# Patient Record
Sex: MALE | Race: WHITE | Employment: PART TIME | URBAN - METROPOLITAN AREA
[De-identification: names, ages, dates, MRNs, and addresses within clinical notes are randomized per-mention and may not be internally consistent; named-entity substitution may affect disease eponyms.]

---

## 2017-01-17 ENCOUNTER — TELEPHONE (OUTPATIENT)
Dept: INTERNAL MEDICINE | Age: 67
End: 2017-01-17

## 2017-01-17 ENCOUNTER — NURSE ONLY (OUTPATIENT)
Dept: INTERNAL MEDICINE | Age: 67
End: 2017-01-17

## 2017-01-17 DIAGNOSIS — E78.00 HYPERCHOLESTEROLEMIA: ICD-10-CM

## 2017-01-17 DIAGNOSIS — E03.4 HYPOTHYROIDISM DUE TO ACQUIRED ATROPHY OF THYROID: Primary | ICD-10-CM

## 2017-01-17 DIAGNOSIS — Z11.59 NEED FOR HEPATITIS C SCREENING TEST: ICD-10-CM

## 2017-01-17 DIAGNOSIS — N18.30 CRF (CHRONIC RENAL FAILURE), STAGE 3 (MODERATE) (HCC): ICD-10-CM

## 2017-01-17 LAB
ALBUMIN SERPL-MCNC: 4.4 G/DL (ref 3.9–4.9)
ALP BLD-CCNC: 82 U/L (ref 35–104)
ALT SERPL-CCNC: 16 U/L (ref 0–41)
ANION GAP SERPL CALCULATED.3IONS-SCNC: 13 MEQ/L (ref 7–13)
AST SERPL-CCNC: 20 U/L (ref 0–40)
BILIRUB SERPL-MCNC: 0.6 MG/DL (ref 0–1.2)
BUN BLDV-MCNC: 17 MG/DL (ref 8–23)
CALCIUM SERPL-MCNC: 9.6 MG/DL (ref 8.6–10.2)
CHLORIDE BLD-SCNC: 102 MEQ/L (ref 98–107)
CHOLESTEROL, TOTAL: 214 MG/DL (ref 0–199)
CO2: 24 MEQ/L (ref 22–29)
CREAT SERPL-MCNC: 1.57 MG/DL (ref 0.7–1.2)
GFR AFRICAN AMERICAN: 53.3
GFR NON-AFRICAN AMERICAN: 44.1
GLOBULIN: 1.9 G/DL (ref 2.3–3.5)
GLUCOSE BLD-MCNC: 90 MG/DL (ref 74–109)
HDLC SERPL-MCNC: 42 MG/DL (ref 40–59)
HEPATITIS C ANTIBODY INTERPRETATION: NORMAL
LDL CHOLESTEROL CALCULATED: 148 MG/DL (ref 0–129)
POTASSIUM SERPL-SCNC: 4.3 MEQ/L (ref 3.5–5.1)
SODIUM BLD-SCNC: 139 MEQ/L (ref 132–144)
T4 FREE: 1.53 NG/DL (ref 0.93–1.7)
TOTAL PROTEIN: 6.3 G/DL (ref 6.4–8.1)
TRIGL SERPL-MCNC: 120 MG/DL (ref 0–200)
TSH SERPL DL<=0.05 MIU/L-ACNC: 0.72 UIU/ML (ref 0.27–4.2)

## 2017-01-17 RX ORDER — MELOXICAM 7.5 MG/1
TABLET ORAL
Qty: 45 TABLET | Refills: 1
Start: 2017-01-17 | End: 2017-03-08

## 2017-02-06 RX ORDER — LOVASTATIN 20 MG/1
TABLET ORAL
Qty: 180 TABLET | Refills: 3 | Status: SHIPPED | OUTPATIENT
Start: 2017-02-06 | End: 2018-02-28 | Stop reason: ALTCHOICE

## 2017-03-08 ENCOUNTER — OFFICE VISIT (OUTPATIENT)
Dept: INTERNAL MEDICINE | Age: 67
End: 2017-03-08

## 2017-03-08 VITALS
HEIGHT: 72 IN | SYSTOLIC BLOOD PRESSURE: 112 MMHG | RESPIRATION RATE: 16 BRPM | TEMPERATURE: 98.6 F | DIASTOLIC BLOOD PRESSURE: 78 MMHG | HEART RATE: 83 BPM | BODY MASS INDEX: 28.17 KG/M2 | OXYGEN SATURATION: 98 % | WEIGHT: 208 LBS

## 2017-03-08 DIAGNOSIS — N20.0 KIDNEY STONES: ICD-10-CM

## 2017-03-08 DIAGNOSIS — N18.30 CRF (CHRONIC RENAL FAILURE), STAGE 3 (MODERATE) (HCC): Primary | ICD-10-CM

## 2017-03-08 PROCEDURE — G8420 CALC BMI NORM PARAMETERS: HCPCS | Performed by: FAMILY MEDICINE

## 2017-03-08 PROCEDURE — 99213 OFFICE O/P EST LOW 20 MIN: CPT | Performed by: FAMILY MEDICINE

## 2017-03-08 PROCEDURE — 4040F PNEUMOC VAC/ADMIN/RCVD: CPT | Performed by: FAMILY MEDICINE

## 2017-03-08 PROCEDURE — 1036F TOBACCO NON-USER: CPT | Performed by: FAMILY MEDICINE

## 2017-03-08 PROCEDURE — G8484 FLU IMMUNIZE NO ADMIN: HCPCS | Performed by: FAMILY MEDICINE

## 2017-03-08 PROCEDURE — 1123F ACP DISCUSS/DSCN MKR DOCD: CPT | Performed by: FAMILY MEDICINE

## 2017-03-08 PROCEDURE — G8427 DOCREV CUR MEDS BY ELIG CLIN: HCPCS | Performed by: FAMILY MEDICINE

## 2017-03-08 PROCEDURE — 3017F COLORECTAL CA SCREEN DOC REV: CPT | Performed by: FAMILY MEDICINE

## 2017-03-11 RX ORDER — CITALOPRAM 40 MG/1
TABLET ORAL
Qty: 90 TABLET | Refills: 0 | Status: SHIPPED | OUTPATIENT
Start: 2017-03-11 | End: 2017-06-06 | Stop reason: SDUPTHER

## 2017-05-24 ENCOUNTER — ANESTHESIA EVENT (OUTPATIENT)
Dept: ENDOSCOPY | Age: 67
End: 2017-05-24
Payer: MEDICARE

## 2017-05-24 ENCOUNTER — ANESTHESIA (OUTPATIENT)
Dept: ENDOSCOPY | Age: 67
End: 2017-05-24
Payer: MEDICARE

## 2017-05-24 ENCOUNTER — HOSPITAL ENCOUNTER (OUTPATIENT)
Age: 67
Setting detail: OUTPATIENT SURGERY
Discharge: HOME | End: 2017-05-24
Attending: INTERNAL MEDICINE | Admitting: INTERNAL MEDICINE
Payer: MEDICARE

## 2017-05-24 VITALS
RESPIRATION RATE: 18 BRPM | DIASTOLIC BLOOD PRESSURE: 82 MMHG | BODY MASS INDEX: 28.31 KG/M2 | OXYGEN SATURATION: 94 % | SYSTOLIC BLOOD PRESSURE: 126 MMHG | HEART RATE: 84 BPM | TEMPERATURE: 97.5 F | WEIGHT: 209 LBS | HEIGHT: 72 IN

## 2017-05-24 VITALS
OXYGEN SATURATION: 97 % | RESPIRATION RATE: 7 BRPM | DIASTOLIC BLOOD PRESSURE: 74 MMHG | SYSTOLIC BLOOD PRESSURE: 133 MMHG

## 2017-05-24 PROCEDURE — 6360000002 HC RX W HCPCS: Performed by: NURSE ANESTHETIST, CERTIFIED REGISTERED

## 2017-05-24 PROCEDURE — 7100000010 HC PHASE II RECOVERY - FIRST 15 MIN: Performed by: INTERNAL MEDICINE

## 2017-05-24 PROCEDURE — 3609027000 HC COLONOSCOPY: Performed by: INTERNAL MEDICINE

## 2017-05-24 PROCEDURE — 3700000000 HC ANESTHESIA ATTENDED CARE: Performed by: INTERNAL MEDICINE

## 2017-05-24 PROCEDURE — 3700000001 HC ADD 15 MINUTES (ANESTHESIA): Performed by: INTERNAL MEDICINE

## 2017-05-24 PROCEDURE — 2580000003 HC RX 258: Performed by: STUDENT IN AN ORGANIZED HEALTH CARE EDUCATION/TRAINING PROGRAM

## 2017-05-24 PROCEDURE — 2500000003 HC RX 250 WO HCPCS: Performed by: NURSE ANESTHETIST, CERTIFIED REGISTERED

## 2017-05-24 PROCEDURE — 88305 TISSUE EXAM BY PATHOLOGIST: CPT

## 2017-05-24 RX ORDER — ONDANSETRON 2 MG/ML
4 INJECTION INTRAMUSCULAR; INTRAVENOUS
Status: DISCONTINUED | OUTPATIENT
Start: 2017-05-24 | End: 2017-05-24 | Stop reason: HOSPADM

## 2017-05-24 RX ORDER — GLYCOPYRROLATE 0.2 MG/ML
INJECTION INTRAMUSCULAR; INTRAVENOUS PRN
Status: DISCONTINUED | OUTPATIENT
Start: 2017-05-24 | End: 2017-05-24 | Stop reason: SDUPTHER

## 2017-05-24 RX ORDER — PROPOFOL 10 MG/ML
INJECTION, EMULSION INTRAVENOUS PRN
Status: DISCONTINUED | OUTPATIENT
Start: 2017-05-24 | End: 2017-05-24 | Stop reason: SDUPTHER

## 2017-05-24 RX ORDER — SODIUM CHLORIDE 9 MG/ML
INJECTION, SOLUTION INTRAVENOUS CONTINUOUS
Status: DISCONTINUED | OUTPATIENT
Start: 2017-05-24 | End: 2017-05-24 | Stop reason: HOSPADM

## 2017-05-24 RX ADMIN — PROPOFOL 10 MG: 10 INJECTION, EMULSION INTRAVENOUS at 13:27

## 2017-05-24 RX ADMIN — PROPOFOL 20 MG: 10 INJECTION, EMULSION INTRAVENOUS at 13:17

## 2017-05-24 RX ADMIN — PROPOFOL 10 MG: 10 INJECTION, EMULSION INTRAVENOUS at 13:23

## 2017-05-24 RX ADMIN — PROPOFOL 50 MG: 10 INJECTION, EMULSION INTRAVENOUS at 13:15

## 2017-05-24 RX ADMIN — PROPOFOL 40 MG: 10 INJECTION, EMULSION INTRAVENOUS at 13:16

## 2017-05-24 RX ADMIN — PROPOFOL 20 MG: 10 INJECTION, EMULSION INTRAVENOUS at 13:19

## 2017-05-24 RX ADMIN — PROPOFOL 10 MG: 10 INJECTION, EMULSION INTRAVENOUS at 13:25

## 2017-05-24 RX ADMIN — SODIUM CHLORIDE: 9 INJECTION, SOLUTION INTRAVENOUS at 12:34

## 2017-05-24 RX ADMIN — GLYCOPYRROLATE 0.2 MG: 0.2 INJECTION INTRAMUSCULAR; INTRAVENOUS at 13:23

## 2017-05-24 RX ADMIN — PROPOFOL 20 MG: 10 INJECTION, EMULSION INTRAVENOUS at 13:21

## 2017-05-24 RX ADMIN — PROPOFOL 50 MG: 10 INJECTION, EMULSION INTRAVENOUS at 13:14

## 2017-05-24 RX ADMIN — PROPOFOL 10 MG: 10 INJECTION, EMULSION INTRAVENOUS at 13:29

## 2017-05-24 ASSESSMENT — PAIN - FUNCTIONAL ASSESSMENT: PAIN_FUNCTIONAL_ASSESSMENT: 0-10

## 2017-05-24 ASSESSMENT — PAIN DESCRIPTION - DESCRIPTORS: DESCRIPTORS: ACHING

## 2017-06-06 RX ORDER — LEVOTHYROXINE SODIUM 112 UG/1
TABLET ORAL
Qty: 90 TABLET | Refills: 3 | Status: SHIPPED | OUTPATIENT
Start: 2017-06-06 | End: 2018-06-01 | Stop reason: SDUPTHER

## 2017-06-06 RX ORDER — CITALOPRAM 40 MG/1
TABLET ORAL
Qty: 90 TABLET | Refills: 3 | Status: SHIPPED | OUTPATIENT
Start: 2017-06-06 | End: 2018-02-12

## 2017-07-10 ENCOUNTER — OFFICE VISIT (OUTPATIENT)
Dept: INTERNAL MEDICINE | Age: 67
End: 2017-07-10

## 2017-07-10 VITALS
BODY MASS INDEX: 28.47 KG/M2 | RESPIRATION RATE: 16 BRPM | TEMPERATURE: 98.5 F | HEART RATE: 70 BPM | OXYGEN SATURATION: 94 % | HEIGHT: 72 IN | WEIGHT: 210.2 LBS | DIASTOLIC BLOOD PRESSURE: 70 MMHG | SYSTOLIC BLOOD PRESSURE: 100 MMHG

## 2017-07-10 DIAGNOSIS — R25.1 TREMORS OF NERVOUS SYSTEM: ICD-10-CM

## 2017-07-10 DIAGNOSIS — M62.40 INVOLUNTARY MUSCLE CONTRACTIONS: ICD-10-CM

## 2017-07-10 DIAGNOSIS — N18.30 CRF (CHRONIC RENAL FAILURE), STAGE 3 (MODERATE) (HCC): Primary | ICD-10-CM

## 2017-07-10 PROCEDURE — 3017F COLORECTAL CA SCREEN DOC REV: CPT | Performed by: FAMILY MEDICINE

## 2017-07-10 PROCEDURE — 1123F ACP DISCUSS/DSCN MKR DOCD: CPT | Performed by: FAMILY MEDICINE

## 2017-07-10 PROCEDURE — G8427 DOCREV CUR MEDS BY ELIG CLIN: HCPCS | Performed by: FAMILY MEDICINE

## 2017-07-10 PROCEDURE — G8419 CALC BMI OUT NRM PARAM NOF/U: HCPCS | Performed by: FAMILY MEDICINE

## 2017-07-10 PROCEDURE — 99214 OFFICE O/P EST MOD 30 MIN: CPT | Performed by: FAMILY MEDICINE

## 2017-07-10 PROCEDURE — 1036F TOBACCO NON-USER: CPT | Performed by: FAMILY MEDICINE

## 2017-07-10 PROCEDURE — 4040F PNEUMOC VAC/ADMIN/RCVD: CPT | Performed by: FAMILY MEDICINE

## 2017-07-25 ENCOUNTER — TELEPHONE (OUTPATIENT)
Dept: INTERNAL MEDICINE | Age: 67
End: 2017-07-25

## 2017-07-31 ENCOUNTER — HOSPITAL ENCOUNTER (EMERGENCY)
Age: 67
Discharge: HOME | End: 2017-07-31
Payer: MEDICARE

## 2017-07-31 VITALS
RESPIRATION RATE: 14 BRPM | WEIGHT: 209 LBS | DIASTOLIC BLOOD PRESSURE: 76 MMHG | TEMPERATURE: 98.5 F | HEART RATE: 89 BPM | BODY MASS INDEX: 28.31 KG/M2 | OXYGEN SATURATION: 96 % | SYSTOLIC BLOOD PRESSURE: 112 MMHG | HEIGHT: 72 IN

## 2017-07-31 DIAGNOSIS — T16.2XXA FOREIGN BODY IN EAR, LEFT, INITIAL ENCOUNTER: Primary | ICD-10-CM

## 2017-07-31 PROCEDURE — 69200 CLEAR OUTER EAR CANAL: CPT

## 2017-07-31 PROCEDURE — 99282 EMERGENCY DEPT VISIT SF MDM: CPT

## 2017-07-31 ASSESSMENT — ENCOUNTER SYMPTOMS
SHORTNESS OF BREATH: 0
ABDOMINAL PAIN: 0
BACK PAIN: 0
COUGH: 0

## 2017-07-31 ASSESSMENT — PAIN DESCRIPTION - FREQUENCY: FREQUENCY: CONTINUOUS

## 2017-07-31 ASSESSMENT — PAIN DESCRIPTION - PAIN TYPE: TYPE: ACUTE PAIN

## 2017-07-31 ASSESSMENT — PAIN DESCRIPTION - LOCATION: LOCATION: EAR

## 2017-07-31 ASSESSMENT — PAIN SCALES - GENERAL: PAINLEVEL_OUTOF10: 4

## 2017-07-31 ASSESSMENT — PAIN DESCRIPTION - DESCRIPTORS: DESCRIPTORS: HEADACHE;PRESSURE

## 2017-07-31 ASSESSMENT — PAIN DESCRIPTION - ORIENTATION: ORIENTATION: LEFT

## 2017-11-13 ENCOUNTER — HOSPITAL ENCOUNTER (OUTPATIENT)
Dept: CT IMAGING | Age: 67
Discharge: HOME | End: 2017-11-13
Payer: MEDICARE

## 2017-11-13 VITALS
BODY MASS INDEX: 28.44 KG/M2 | DIASTOLIC BLOOD PRESSURE: 79 MMHG | RESPIRATION RATE: 16 BRPM | HEART RATE: 81 BPM | SYSTOLIC BLOOD PRESSURE: 120 MMHG | WEIGHT: 210 LBS | HEIGHT: 72 IN

## 2017-11-13 DIAGNOSIS — R56.9 SEIZURES (HCC): ICD-10-CM

## 2017-11-13 DIAGNOSIS — G91.9 IDIOPATHIC HYDROCEPHALUS (HCC): ICD-10-CM

## 2017-11-13 PROCEDURE — 70450 CT HEAD/BRAIN W/O DYE: CPT

## 2017-12-27 ENCOUNTER — HOSPITAL ENCOUNTER (OUTPATIENT)
Dept: NEUROLOGY | Age: 67
Discharge: HOME | End: 2017-12-27
Payer: MEDICARE

## 2017-12-27 PROCEDURE — 95816 EEG AWAKE AND DROWSY: CPT

## 2017-12-28 NOTE — PROCEDURES
[address]                       [address], [address] [facility]                            ELECTROENCEPHALOGRAM REPORT    PATIENT NAME: Rober Oneal                    :        1950  MED REC NO:   [id number]                            ROOM:  ACCOUNT NO:   [de-identified]                           ADMIT DATE: 2017  PROVIDER:     Kim Madera MD    DATE OF EE2017    MEDICATIONS:  Lopressor, Celexa, Synthroid, and Mevacor. This is a spontaneous 21-channel EEG recording for this patient with  questionable seizures. The background rhythm of this EEG shows 8 to 9 Hz  posterior dominant rhythm of alpha. This is symmetrical and attenuates  with eye opening. EKG is monitored, this is regular. Photic stimulation  was performed without any photic responses. There is no photic response to  seizures. The records are better regulated when hyperventilation is  performed with good effort. Hyperventilation is performed with good effort  with better regulated alpha rhythms. There is no suggestion of any tremors  or epileptiform discharge. IMPRESSION:  This is normal well-regulated EEG recording. Clinical course  is recommended.       Subha Tiwari MD    D: 2017 18:53:47       T: 2017 19:23:15     [email or web address]  Job#: [id number]     Doc#: [id number]    CC:

## 2018-02-12 ENCOUNTER — OFFICE VISIT (OUTPATIENT)
Dept: INTERNAL MEDICINE CLINIC | Age: 68
End: 2018-02-12
Payer: MEDICARE

## 2018-02-12 VITALS
OXYGEN SATURATION: 96 % | WEIGHT: 216.4 LBS | SYSTOLIC BLOOD PRESSURE: 112 MMHG | HEIGHT: 72 IN | DIASTOLIC BLOOD PRESSURE: 80 MMHG | BODY MASS INDEX: 29.31 KG/M2 | TEMPERATURE: 98 F | HEART RATE: 95 BPM

## 2018-02-12 DIAGNOSIS — L30.9 DERMATITIS: ICD-10-CM

## 2018-02-12 DIAGNOSIS — E03.4 HYPOTHYROIDISM DUE TO ACQUIRED ATROPHY OF THYROID: Primary | ICD-10-CM

## 2018-02-12 DIAGNOSIS — E78.00 HYPERCHOLESTEROLEMIA: ICD-10-CM

## 2018-02-12 DIAGNOSIS — F33.41 MAJOR DEPRESSIVE DISORDER, RECURRENT, IN PARTIAL REMISSION (HCC): ICD-10-CM

## 2018-02-12 PROCEDURE — G8427 DOCREV CUR MEDS BY ELIG CLIN: HCPCS | Performed by: FAMILY MEDICINE

## 2018-02-12 PROCEDURE — 3017F COLORECTAL CA SCREEN DOC REV: CPT | Performed by: FAMILY MEDICINE

## 2018-02-12 PROCEDURE — G8419 CALC BMI OUT NRM PARAM NOF/U: HCPCS | Performed by: FAMILY MEDICINE

## 2018-02-12 PROCEDURE — 1123F ACP DISCUSS/DSCN MKR DOCD: CPT | Performed by: FAMILY MEDICINE

## 2018-02-12 PROCEDURE — 4040F PNEUMOC VAC/ADMIN/RCVD: CPT | Performed by: FAMILY MEDICINE

## 2018-02-12 PROCEDURE — 1036F TOBACCO NON-USER: CPT | Performed by: FAMILY MEDICINE

## 2018-02-12 PROCEDURE — 99214 OFFICE O/P EST MOD 30 MIN: CPT | Performed by: FAMILY MEDICINE

## 2018-02-12 PROCEDURE — G8484 FLU IMMUNIZE NO ADMIN: HCPCS | Performed by: FAMILY MEDICINE

## 2018-02-12 RX ORDER — PRIMIDONE 50 MG/1
50 TABLET ORAL 3 TIMES DAILY
COMMUNITY
End: 2019-11-27 | Stop reason: SDUPTHER

## 2018-02-12 RX ORDER — BUSPIRONE HYDROCHLORIDE 10 MG/1
10 TABLET ORAL 3 TIMES DAILY
COMMUNITY
End: 2019-11-21 | Stop reason: SDUPTHER

## 2018-02-12 ASSESSMENT — PATIENT HEALTH QUESTIONNAIRE - PHQ9
2. FEELING DOWN, DEPRESSED OR HOPELESS: 0
1. LITTLE INTEREST OR PLEASURE IN DOING THINGS: 0
SUM OF ALL RESPONSES TO PHQ QUESTIONS 1-9: 0
SUM OF ALL RESPONSES TO PHQ9 QUESTIONS 1 & 2: 0

## 2018-02-27 ENCOUNTER — NURSE ONLY (OUTPATIENT)
Dept: INTERNAL MEDICINE CLINIC | Age: 68
End: 2018-02-27
Payer: MEDICARE

## 2018-02-27 DIAGNOSIS — E03.4 HYPOTHYROIDISM DUE TO ACQUIRED ATROPHY OF THYROID: ICD-10-CM

## 2018-02-27 DIAGNOSIS — N18.30 CRF (CHRONIC RENAL FAILURE), STAGE 3 (MODERATE) (HCC): ICD-10-CM

## 2018-02-27 DIAGNOSIS — E78.00 HYPERCHOLESTEROLEMIA: ICD-10-CM

## 2018-02-27 LAB
ALBUMIN SERPL-MCNC: 4.5 G/DL (ref 3.9–4.9)
ALP BLD-CCNC: 86 U/L (ref 35–104)
ALT SERPL-CCNC: 19 U/L (ref 0–41)
ANION GAP SERPL CALCULATED.3IONS-SCNC: 15 MEQ/L (ref 7–13)
AST SERPL-CCNC: 19 U/L (ref 0–40)
BILIRUB SERPL-MCNC: 0.4 MG/DL (ref 0–1.2)
BUN BLDV-MCNC: 24 MG/DL (ref 8–23)
CALCIUM SERPL-MCNC: 9.5 MG/DL (ref 8.6–10.2)
CHLORIDE BLD-SCNC: 104 MEQ/L (ref 98–107)
CHOLESTEROL, TOTAL: 232 MG/DL (ref 0–199)
CO2: 25 MEQ/L (ref 22–29)
CREAT SERPL-MCNC: 1.34 MG/DL (ref 0.7–1.2)
GFR AFRICAN AMERICAN: >60
GFR NON-AFRICAN AMERICAN: 52.7
GLOBULIN: 2.4 G/DL (ref 2.3–3.5)
GLUCOSE BLD-MCNC: 90 MG/DL (ref 74–109)
HDLC SERPL-MCNC: 46 MG/DL (ref 40–59)
LDL CHOLESTEROL CALCULATED: 163 MG/DL (ref 0–129)
POTASSIUM SERPL-SCNC: 5 MEQ/L (ref 3.5–5.1)
SODIUM BLD-SCNC: 144 MEQ/L (ref 132–144)
T4 FREE: 1.29 NG/DL (ref 0.93–1.7)
TOTAL PROTEIN: 6.9 G/DL (ref 6.4–8.1)
TRIGL SERPL-MCNC: 116 MG/DL (ref 0–200)
TSH SERPL DL<=0.05 MIU/L-ACNC: 0.78 UIU/ML (ref 0.27–4.2)

## 2018-02-27 PROCEDURE — 36415 COLL VENOUS BLD VENIPUNCTURE: CPT | Performed by: FAMILY MEDICINE

## 2018-02-28 RX ORDER — ATORVASTATIN CALCIUM 40 MG/1
40 TABLET, FILM COATED ORAL NIGHTLY
Qty: 90 TABLET | Refills: 3 | Status: SHIPPED | OUTPATIENT
Start: 2018-02-28 | End: 2019-03-06 | Stop reason: SDUPTHER

## 2018-03-20 ENCOUNTER — TELEPHONE (OUTPATIENT)
Dept: INTERNAL MEDICINE CLINIC | Age: 68
End: 2018-03-20

## 2018-06-01 RX ORDER — LEVOTHYROXINE SODIUM 112 UG/1
TABLET ORAL
Qty: 90 TABLET | Refills: 1 | Status: SHIPPED | OUTPATIENT
Start: 2018-06-01 | End: 2019-01-07 | Stop reason: SDUPTHER

## 2018-08-14 ENCOUNTER — OFFICE VISIT (OUTPATIENT)
Dept: INTERNAL MEDICINE CLINIC | Age: 68
End: 2018-08-14
Payer: MEDICARE

## 2018-08-14 VITALS
TEMPERATURE: 97.7 F | HEART RATE: 80 BPM | BODY MASS INDEX: 28.83 KG/M2 | RESPIRATION RATE: 17 BRPM | DIASTOLIC BLOOD PRESSURE: 60 MMHG | OXYGEN SATURATION: 97 % | WEIGHT: 212.6 LBS | SYSTOLIC BLOOD PRESSURE: 118 MMHG

## 2018-08-14 DIAGNOSIS — M54.5 CHRONIC MIDLINE LOW BACK PAIN, WITH SCIATICA PRESENCE UNSPECIFIED: ICD-10-CM

## 2018-08-14 DIAGNOSIS — G89.29 CHRONIC MIDLINE LOW BACK PAIN, WITH SCIATICA PRESENCE UNSPECIFIED: ICD-10-CM

## 2018-08-14 DIAGNOSIS — E03.4 HYPOTHYROIDISM DUE TO ACQUIRED ATROPHY OF THYROID: ICD-10-CM

## 2018-08-14 DIAGNOSIS — E78.00 HYPERCHOLESTEROLEMIA: Primary | ICD-10-CM

## 2018-08-14 DIAGNOSIS — F34.1 DYSTHYMIA: ICD-10-CM

## 2018-08-14 PROBLEM — M54.50 CHRONIC MIDLINE LOW BACK PAIN: Status: ACTIVE | Noted: 2018-08-14

## 2018-08-14 PROCEDURE — 1123F ACP DISCUSS/DSCN MKR DOCD: CPT | Performed by: FAMILY MEDICINE

## 2018-08-14 PROCEDURE — 1036F TOBACCO NON-USER: CPT | Performed by: FAMILY MEDICINE

## 2018-08-14 PROCEDURE — G8427 DOCREV CUR MEDS BY ELIG CLIN: HCPCS | Performed by: FAMILY MEDICINE

## 2018-08-14 PROCEDURE — G8419 CALC BMI OUT NRM PARAM NOF/U: HCPCS | Performed by: FAMILY MEDICINE

## 2018-08-14 PROCEDURE — 4040F PNEUMOC VAC/ADMIN/RCVD: CPT | Performed by: FAMILY MEDICINE

## 2018-08-14 PROCEDURE — 3017F COLORECTAL CA SCREEN DOC REV: CPT | Performed by: FAMILY MEDICINE

## 2018-08-14 PROCEDURE — 99214 OFFICE O/P EST MOD 30 MIN: CPT | Performed by: FAMILY MEDICINE

## 2018-08-14 PROCEDURE — 1101F PT FALLS ASSESS-DOCD LE1/YR: CPT | Performed by: FAMILY MEDICINE

## 2018-08-14 ASSESSMENT — PATIENT HEALTH QUESTIONNAIRE - PHQ9
SUM OF ALL RESPONSES TO PHQ9 QUESTIONS 1 & 2: 0
2. FEELING DOWN, DEPRESSED OR HOPELESS: 0
1. LITTLE INTEREST OR PLEASURE IN DOING THINGS: 0
SUM OF ALL RESPONSES TO PHQ QUESTIONS 1-9: 0
SUM OF ALL RESPONSES TO PHQ QUESTIONS 1-9: 0

## 2018-08-14 NOTE — PROGRESS NOTES
Patient: Gaudencio Jean Baptiste    YOB: 1950    Date: 8/14/18    Chief Complaint   Patient presents with    Hypothyroidism     Patient presents for a 6 moth follow up. Patient feeling well today, no refills.  Health Maintenance     Denies shingrix. Patient Active Problem List    Diagnosis Date Noted    Chronic midline low back pain 08/14/2018    Hypothyroidism due to acquired atrophy of thyroid 02/12/2018    Tremors of nervous system 07/10/2017    Hypercholesterolemia 12/06/2016    Kidney stones 05/14/2015    Tremors of nervous system 04/09/2015    Degenerative progressive high myopia 03/26/2015    Medial meniscus tear 03/27/2014    Knee pain, right 03/03/2014    Seasonal allergic conjunctivitis     BPH (benign prostatic hyperplasia)     Depression     Insomnia        Allergies   Allergen Reactions    Food Other (See Comments)     Burning sensation in stomach    Lactose Intolerance (Gi) Other (See Comments)     Sour stomach       Vitals:    08/14/18 1515   BP: 118/60   Site: Left Arm   Position: Sitting   Cuff Size: Small Adult   Pulse: 80   Resp: 17   Temp: 97.7 °F (36.5 °C)   TempSrc: Oral   SpO2: 97%   Weight: 212 lb 9.6 oz (96.4 kg)     Body mass index is 28.83 kg/m². HPI    He comes in today to follow-up on his chronic low back pain is thyroid disease and his depression. His back pain comes and goes us around his low back below the iliac crests and radiates more to the right and sometimes down to the leg. It's worse at work when he's walking around and standing on concrete and better when he is resting. Doesn't do any specific stretching or exercise. Also his weight is been stable he has no fever chills cough nausea or vomiting and his lab work was normal the spring. So the rest that time discussing stressors in his life somewhat revolve around the fact that he was stopped and he was able to find that he has 4 siblings that may still be alive that he could contact.   We Hypothyroidism due to acquired atrophy of thyroid  Check labs at follow-up    3. Chronic midline low back pain, with sciatica presence unspecified  Consider physical therapy if this bothers him    4. Dysthymia  Stable                Plan:  Current Outpatient Prescriptions   Medication Sig Dispense Refill    levothyroxine (SYNTHROID) 112 MCG tablet TAKE ONE TABLET BY MOUTH ONCE DAILY 90 tablet 1    atorvastatin (LIPITOR) 40 MG tablet Take 1 tablet by mouth nightly 90 tablet 3    busPIRone (BUSPAR) 10 MG tablet Take 10 mg by mouth 3 times daily      primidone (MYSOLINE) 50 MG tablet Take 50 mg by mouth 3 times daily      hydrocortisone 2.5 % cream Apply topically 2 times daily. 453 g 0    Multiple Vitamins-Minerals (MENS 50+ MULTI VITAMIN/MIN PO) Take 1 tablet by mouth daily       No current facility-administered medications for this visit. No orders of the defined types were placed in this encounter. No orders of the defined types were placed in this encounter. Return in about 6 months (around 2/14/2019).     Dr. Whitt Code      8/14/18  4:07 PM

## 2018-10-11 ENCOUNTER — NURSE ONLY (OUTPATIENT)
Dept: INTERNAL MEDICINE CLINIC | Age: 68
End: 2018-10-11
Payer: MEDICARE

## 2018-10-11 DIAGNOSIS — Z23 NEED FOR INFLUENZA VACCINATION: Primary | ICD-10-CM

## 2018-10-11 PROCEDURE — G0008 ADMIN INFLUENZA VIRUS VAC: HCPCS | Performed by: FAMILY MEDICINE

## 2018-10-11 PROCEDURE — 90662 IIV NO PRSV INCREASED AG IM: CPT | Performed by: FAMILY MEDICINE

## 2019-01-07 RX ORDER — LEVOTHYROXINE SODIUM 112 UG/1
TABLET ORAL
Qty: 90 TABLET | Refills: 1 | Status: SHIPPED | OUTPATIENT
Start: 2019-01-07 | End: 2019-02-20 | Stop reason: SDUPTHER

## 2019-02-11 PROBLEM — M54.50 CHRONIC MIDLINE LOW BACK PAIN: Status: RESOLVED | Noted: 2018-08-14 | Resolved: 2019-02-11

## 2019-02-11 PROBLEM — R25.1 TREMORS OF NERVOUS SYSTEM: Status: RESOLVED | Noted: 2017-07-10 | Resolved: 2019-02-11

## 2019-02-11 PROBLEM — G89.29 CHRONIC MIDLINE LOW BACK PAIN: Status: RESOLVED | Noted: 2018-08-14 | Resolved: 2019-02-11

## 2019-02-12 ENCOUNTER — OFFICE VISIT (OUTPATIENT)
Dept: INTERNAL MEDICINE CLINIC | Age: 69
End: 2019-02-12
Payer: MEDICARE

## 2019-02-12 VITALS
TEMPERATURE: 97.7 F | RESPIRATION RATE: 16 BRPM | OXYGEN SATURATION: 98 % | WEIGHT: 212.8 LBS | SYSTOLIC BLOOD PRESSURE: 128 MMHG | HEART RATE: 73 BPM | BODY MASS INDEX: 28.82 KG/M2 | DIASTOLIC BLOOD PRESSURE: 76 MMHG | HEIGHT: 72 IN

## 2019-02-12 DIAGNOSIS — F51.04 PSYCHOPHYSIOLOGICAL INSOMNIA: ICD-10-CM

## 2019-02-12 DIAGNOSIS — F33.41 RECURRENT MAJOR DEPRESSIVE DISORDER IN PARTIAL REMISSION (HCC): ICD-10-CM

## 2019-02-12 DIAGNOSIS — E78.00 HYPERCHOLESTEROLEMIA: Primary | ICD-10-CM

## 2019-02-12 DIAGNOSIS — M47.816 ARTHRITIS, LUMBAR SPINE: ICD-10-CM

## 2019-02-12 DIAGNOSIS — E03.4 HYPOTHYROIDISM DUE TO ACQUIRED ATROPHY OF THYROID: ICD-10-CM

## 2019-02-12 DIAGNOSIS — Z12.5 SCREENING FOR PROSTATE CANCER: ICD-10-CM

## 2019-02-12 PROCEDURE — 1036F TOBACCO NON-USER: CPT | Performed by: FAMILY MEDICINE

## 2019-02-12 PROCEDURE — 1123F ACP DISCUSS/DSCN MKR DOCD: CPT | Performed by: FAMILY MEDICINE

## 2019-02-12 PROCEDURE — 4040F PNEUMOC VAC/ADMIN/RCVD: CPT | Performed by: FAMILY MEDICINE

## 2019-02-12 PROCEDURE — 99214 OFFICE O/P EST MOD 30 MIN: CPT | Performed by: FAMILY MEDICINE

## 2019-02-12 PROCEDURE — G8482 FLU IMMUNIZE ORDER/ADMIN: HCPCS | Performed by: FAMILY MEDICINE

## 2019-02-12 PROCEDURE — G8427 DOCREV CUR MEDS BY ELIG CLIN: HCPCS | Performed by: FAMILY MEDICINE

## 2019-02-12 PROCEDURE — G8419 CALC BMI OUT NRM PARAM NOF/U: HCPCS | Performed by: FAMILY MEDICINE

## 2019-02-12 PROCEDURE — 3017F COLORECTAL CA SCREEN DOC REV: CPT | Performed by: FAMILY MEDICINE

## 2019-02-12 PROCEDURE — 1101F PT FALLS ASSESS-DOCD LE1/YR: CPT | Performed by: FAMILY MEDICINE

## 2019-02-12 RX ORDER — TRAZODONE HYDROCHLORIDE 50 MG/1
50 TABLET ORAL NIGHTLY PRN
Qty: 90 TABLET | Refills: 1 | Status: SHIPPED | OUTPATIENT
Start: 2019-02-12 | End: 2021-02-25

## 2019-02-12 RX ORDER — POTASSIUM CITRATE 10 MEQ/1
10 TABLET, EXTENDED RELEASE ORAL
COMMUNITY
End: 2022-06-28 | Stop reason: ALTCHOICE

## 2019-02-20 DIAGNOSIS — Z12.5 SCREENING FOR PROSTATE CANCER: ICD-10-CM

## 2019-02-20 DIAGNOSIS — E03.4 HYPOTHYROIDISM DUE TO ACQUIRED ATROPHY OF THYROID: Primary | ICD-10-CM

## 2019-02-20 DIAGNOSIS — E87.0 HYPERNATREMIA: ICD-10-CM

## 2019-02-20 DIAGNOSIS — E78.00 HYPERCHOLESTEROLEMIA: ICD-10-CM

## 2019-02-20 DIAGNOSIS — E03.4 HYPOTHYROIDISM DUE TO ACQUIRED ATROPHY OF THYROID: ICD-10-CM

## 2019-02-20 LAB
ALBUMIN SERPL-MCNC: 4.1 G/DL (ref 3.5–4.6)
ALP BLD-CCNC: 93 U/L (ref 35–104)
ALT SERPL-CCNC: 20 U/L (ref 0–41)
ANION GAP SERPL CALCULATED.3IONS-SCNC: 17 MEQ/L (ref 9–15)
AST SERPL-CCNC: 21 U/L (ref 0–40)
BILIRUB SERPL-MCNC: 0.5 MG/DL (ref 0.2–0.7)
BUN BLDV-MCNC: 13 MG/DL (ref 8–23)
CALCIUM SERPL-MCNC: 9.1 MG/DL (ref 8.5–9.9)
CHLORIDE BLD-SCNC: 110 MEQ/L (ref 95–107)
CHOLESTEROL, TOTAL: 133 MG/DL (ref 0–199)
CO2: 22 MEQ/L (ref 20–31)
CREAT SERPL-MCNC: 1.24 MG/DL (ref 0.7–1.2)
GFR AFRICAN AMERICAN: >60
GFR NON-AFRICAN AMERICAN: 57.5
GLOBULIN: 2.6 G/DL (ref 2.3–3.5)
GLUCOSE BLD-MCNC: 79 MG/DL (ref 70–99)
HDLC SERPL-MCNC: 35 MG/DL (ref 40–59)
LDL CHOLESTEROL CALCULATED: 85 MG/DL (ref 0–129)
POTASSIUM SERPL-SCNC: 4.3 MEQ/L (ref 3.4–4.9)
PROSTATE SPECIFIC ANTIGEN: 1.11 NG/ML (ref 0–6.22)
SODIUM BLD-SCNC: 149 MEQ/L (ref 135–144)
T4 FREE: 1.69 NG/DL (ref 0.84–1.68)
TOTAL PROTEIN: 6.7 G/DL (ref 6.3–8)
TRIGL SERPL-MCNC: 67 MG/DL (ref 0–150)
TSH SERPL DL<=0.05 MIU/L-ACNC: 0.3 UIU/ML (ref 0.44–3.86)

## 2019-02-20 RX ORDER — LEVOTHYROXINE SODIUM 0.1 MG/1
TABLET ORAL
Qty: 90 TABLET | Refills: 1 | Status: SHIPPED | OUTPATIENT
Start: 2019-02-20 | End: 2019-08-13 | Stop reason: SDUPTHER

## 2019-03-06 RX ORDER — ATORVASTATIN CALCIUM 40 MG/1
40 TABLET, FILM COATED ORAL NIGHTLY
Qty: 90 TABLET | Refills: 0 | Status: SHIPPED | OUTPATIENT
Start: 2019-03-06 | End: 2019-05-30 | Stop reason: SDUPTHER

## 2019-05-07 ENCOUNTER — TELEPHONE (OUTPATIENT)
Dept: FAMILY MEDICINE CLINIC | Age: 69
End: 2019-05-07

## 2019-05-08 NOTE — TELEPHONE ENCOUNTER
Take your medications as directed. No reason to modify it for your trip. Just take the B 12 on regular schedule. You can take it a few days sooner or just as you return if necessary.

## 2019-05-08 NOTE — TELEPHONE ENCOUNTER
Taken postop in to get a TD. Also they might want to consider getting a booster for measles mumps rubella vaccine as it is much more common in the [place] part of [facility] and they are going to be traveling  a great distance and might be exposed to measles.

## 2019-08-29 ENCOUNTER — OFFICE VISIT (OUTPATIENT)
Dept: FAMILY MEDICINE CLINIC | Age: 69
End: 2019-08-29
Payer: MEDICARE

## 2019-08-29 VITALS
HEART RATE: 80 BPM | DIASTOLIC BLOOD PRESSURE: 78 MMHG | OXYGEN SATURATION: 99 % | WEIGHT: 203 LBS | SYSTOLIC BLOOD PRESSURE: 100 MMHG | HEIGHT: 72 IN | RESPIRATION RATE: 16 BRPM | TEMPERATURE: 97.3 F | BODY MASS INDEX: 27.5 KG/M2

## 2019-08-29 DIAGNOSIS — R07.89 ATYPICAL CHEST PAIN: ICD-10-CM

## 2019-08-29 DIAGNOSIS — E03.4 HYPOTHYROIDISM DUE TO ACQUIRED ATROPHY OF THYROID: ICD-10-CM

## 2019-08-29 DIAGNOSIS — E78.00 HYPERCHOLESTEROLEMIA: Primary | ICD-10-CM

## 2019-08-29 PROCEDURE — 99214 OFFICE O/P EST MOD 30 MIN: CPT | Performed by: FAMILY MEDICINE

## 2019-08-29 PROCEDURE — 4040F PNEUMOC VAC/ADMIN/RCVD: CPT | Performed by: FAMILY MEDICINE

## 2019-08-29 PROCEDURE — 1036F TOBACCO NON-USER: CPT | Performed by: FAMILY MEDICINE

## 2019-08-29 PROCEDURE — G8427 DOCREV CUR MEDS BY ELIG CLIN: HCPCS | Performed by: FAMILY MEDICINE

## 2019-08-29 PROCEDURE — 1123F ACP DISCUSS/DSCN MKR DOCD: CPT | Performed by: FAMILY MEDICINE

## 2019-08-29 PROCEDURE — 3017F COLORECTAL CA SCREEN DOC REV: CPT | Performed by: FAMILY MEDICINE

## 2019-08-29 PROCEDURE — G8419 CALC BMI OUT NRM PARAM NOF/U: HCPCS | Performed by: FAMILY MEDICINE

## 2019-08-29 NOTE — PROGRESS NOTES
BY MOUTH ONCE DAILY 90 tablet 3    atorvastatin (LIPITOR) 40 MG tablet TAKE ONE TABLET BY MOUTH NIGHTLY 90 tablet 3    potassium citrate (UROCIT-K) 10 MEQ (1080 MG) extended release tablet Take 10 mEq by mouth 3 times daily (with meals)      traZODone (DESYREL) 50 MG tablet Take 1 tablet by mouth nightly as needed for Sleep 90 tablet 1    busPIRone (BUSPAR) 10 MG tablet Take 10 mg by mouth 3 times daily      primidone (MYSOLINE) 50 MG tablet Take 50 mg by mouth 3 times daily      hydrocortisone 2.5 % cream Apply topically 2 times daily. 453 g 0    Multiple Vitamins-Minerals (MENS 50+ MULTI VITAMIN/MIN PO) Take 1 tablet by mouth daily       No current facility-administered medications for this visit. Orders Placed This Encounter   Procedures    TSH Without Reflex     Standing Status:   Future     Standing Expiration Date:   8/29/2020    Comprehensive Metabolic Panel     Standing Status:   Future     Standing Expiration Date:   8/28/2020    Lipid Panel     Standing Status:   Future     Standing Expiration Date:   8/28/2020     Order Specific Question:   Is Patient Fasting?/# of Hours     Answer:   unknown    CARDIAC STRESS TEST EXERCISE ONLY     Order Specific Question:   Reason for Exam?     Answer:   Chest pain       No orders of the defined types were placed in this encounter. Return in about 6 months (around 2/29/2020).     Dr. Estrada Cotter      8/29/19  4:42 PM

## 2019-09-11 ENCOUNTER — HOSPITAL ENCOUNTER (OUTPATIENT)
Dept: NON INVASIVE DIAGNOSTICS | Age: 69
Discharge: HOME | End: 2019-09-11
Payer: MEDICARE

## 2019-09-11 PROCEDURE — 93018 CV STRESS TEST I&R ONLY: CPT | Performed by: INTERNAL MEDICINE

## 2019-09-11 PROCEDURE — 93017 CV STRESS TEST TRACING ONLY: CPT

## 2019-09-24 DIAGNOSIS — E03.4 HYPOTHYROIDISM DUE TO ACQUIRED ATROPHY OF THYROID: ICD-10-CM

## 2019-09-24 DIAGNOSIS — E78.00 HYPERCHOLESTEROLEMIA: ICD-10-CM

## 2019-09-24 LAB
ALBUMIN SERPL-MCNC: 4 G/DL (ref 3.5–4.6)
ALP BLD-CCNC: 102 U/L (ref 35–104)
ALT SERPL-CCNC: 21 U/L (ref 0–41)
ANION GAP SERPL CALCULATED.3IONS-SCNC: 13 MEQ/L (ref 9–15)
AST SERPL-CCNC: 21 U/L (ref 0–40)
BILIRUB SERPL-MCNC: 0.5 MG/DL (ref 0.2–0.7)
BUN BLDV-MCNC: 13 MG/DL (ref 8–23)
CALCIUM SERPL-MCNC: 9.4 MG/DL (ref 8.5–9.9)
CHLORIDE BLD-SCNC: 104 MEQ/L (ref 95–107)
CHOLESTEROL, TOTAL: 154 MG/DL (ref 0–199)
CO2: 25 MEQ/L (ref 20–31)
CREAT SERPL-MCNC: 1.3 MG/DL (ref 0.7–1.2)
GFR AFRICAN AMERICAN: >60
GFR NON-AFRICAN AMERICAN: 54.4
GLOBULIN: 2.6 G/DL (ref 2.3–3.5)
GLUCOSE BLD-MCNC: 79 MG/DL (ref 70–99)
HDLC SERPL-MCNC: 38 MG/DL (ref 40–59)
LDL CHOLESTEROL CALCULATED: 96 MG/DL (ref 0–129)
POTASSIUM SERPL-SCNC: 4.9 MEQ/L (ref 3.4–4.9)
SODIUM BLD-SCNC: 142 MEQ/L (ref 135–144)
TOTAL PROTEIN: 6.6 G/DL (ref 6.3–8)
TRIGL SERPL-MCNC: 102 MG/DL (ref 0–150)
TSH SERPL DL<=0.05 MIU/L-ACNC: 0.88 UIU/ML (ref 0.44–3.86)

## 2019-09-26 ENCOUNTER — TELEPHONE (OUTPATIENT)
Dept: FAMILY MEDICINE CLINIC | Age: 69
End: 2019-09-26

## 2019-10-01 ENCOUNTER — OFFICE VISIT (OUTPATIENT)
Dept: FAMILY MEDICINE CLINIC | Age: 69
End: 2019-10-01
Payer: MEDICARE

## 2019-10-01 VITALS
WEIGHT: 206 LBS | HEIGHT: 72 IN | TEMPERATURE: 98.4 F | OXYGEN SATURATION: 98 % | DIASTOLIC BLOOD PRESSURE: 60 MMHG | RESPIRATION RATE: 16 BRPM | HEART RATE: 86 BPM | SYSTOLIC BLOOD PRESSURE: 100 MMHG | BODY MASS INDEX: 27.9 KG/M2

## 2019-10-01 DIAGNOSIS — Z00.00 ROUTINE GENERAL MEDICAL EXAMINATION AT A HEALTH CARE FACILITY: Primary | ICD-10-CM

## 2019-10-01 PROCEDURE — 4040F PNEUMOC VAC/ADMIN/RCVD: CPT | Performed by: FAMILY MEDICINE

## 2019-10-01 PROCEDURE — G0439 PPPS, SUBSEQ VISIT: HCPCS | Performed by: FAMILY MEDICINE

## 2019-10-01 PROCEDURE — 3017F COLORECTAL CA SCREEN DOC REV: CPT | Performed by: FAMILY MEDICINE

## 2019-10-01 PROCEDURE — 1123F ACP DISCUSS/DSCN MKR DOCD: CPT | Performed by: FAMILY MEDICINE

## 2019-10-01 PROCEDURE — G8484 FLU IMMUNIZE NO ADMIN: HCPCS | Performed by: FAMILY MEDICINE

## 2019-10-01 ASSESSMENT — LIFESTYLE VARIABLES
HOW OFTEN DO YOU HAVE SIX OR MORE DRINKS ON ONE OCCASION: 0
HAS A RELATIVE, FRIEND, DOCTOR, OR ANOTHER HEALTH PROFESSIONAL EXPRESSED CONCERN ABOUT YOUR DRINKING OR SUGGESTED YOU CUT DOWN: 0
AUDIT TOTAL SCORE: 1
HAVE YOU OR SOMEONE ELSE BEEN INJURED AS A RESULT OF YOUR DRINKING: 0
HOW OFTEN DURING THE LAST YEAR HAVE YOU HAD A FEELING OF GUILT OR REMORSE AFTER DRINKING: 0
HOW OFTEN DURING THE LAST YEAR HAVE YOU FOUND THAT YOU WERE NOT ABLE TO STOP DRINKING ONCE YOU HAD STARTED: 0
HOW OFTEN DURING THE LAST YEAR HAVE YOU NEEDED AN ALCOHOLIC DRINK FIRST THING IN THE MORNING TO GET YOURSELF GOING AFTER A NIGHT OF HEAVY DRINKING: 0
HOW OFTEN DURING THE LAST YEAR HAVE YOU BEEN UNABLE TO REMEMBER WHAT HAPPENED THE NIGHT BEFORE BECAUSE YOU HAD BEEN DRINKING: 0
HOW MANY STANDARD DRINKS CONTAINING ALCOHOL DO YOU HAVE ON A TYPICAL DAY: 0
HOW OFTEN DO YOU HAVE A DRINK CONTAINING ALCOHOL: 1
AUDIT-C TOTAL SCORE: 1
HOW OFTEN DURING THE LAST YEAR HAVE YOU FAILED TO DO WHAT WAS NORMALLY EXPECTED FROM YOU BECAUSE OF DRINKING: 0

## 2019-10-01 ASSESSMENT — PATIENT HEALTH QUESTIONNAIRE - PHQ9
SUM OF ALL RESPONSES TO PHQ QUESTIONS 1-9: 1
SUM OF ALL RESPONSES TO PHQ QUESTIONS 1-9: 1

## 2019-11-21 RX ORDER — BUSPIRONE HYDROCHLORIDE 10 MG/1
10 TABLET ORAL 3 TIMES DAILY
Qty: 90 TABLET | Refills: 2 | Status: SHIPPED | OUTPATIENT
Start: 2019-11-21 | End: 2020-03-26 | Stop reason: SDUPTHER

## 2019-11-27 ENCOUNTER — OFFICE VISIT (OUTPATIENT)
Dept: NEUROLOGY | Age: 69
End: 2019-11-27
Payer: MEDICARE

## 2019-11-27 VITALS
WEIGHT: 205 LBS | BODY MASS INDEX: 27.77 KG/M2 | DIASTOLIC BLOOD PRESSURE: 73 MMHG | HEIGHT: 72 IN | SYSTOLIC BLOOD PRESSURE: 114 MMHG | HEART RATE: 77 BPM

## 2019-11-27 DIAGNOSIS — F41.1 ANXIETY, GENERALIZED: ICD-10-CM

## 2019-11-27 DIAGNOSIS — R25.1 HAS A TREMOR: ICD-10-CM

## 2019-11-27 PROCEDURE — 99213 OFFICE O/P EST LOW 20 MIN: CPT | Performed by: PSYCHIATRY & NEUROLOGY

## 2019-11-27 RX ORDER — PRIMIDONE 50 MG/1
100 TABLET ORAL NIGHTLY
Qty: 180 TABLET | Refills: 3 | Status: SHIPPED | OUTPATIENT
Start: 2019-11-27 | End: 2019-11-28 | Stop reason: SDUPTHER

## 2019-11-27 ASSESSMENT — ENCOUNTER SYMPTOMS
NAUSEA: 0
TROUBLE SWALLOWING: 0
VOMITING: 0
PHOTOPHOBIA: 0
CHOKING: 0
BACK PAIN: 0
SHORTNESS OF BREATH: 0

## 2019-11-28 RX ORDER — PRIMIDONE 50 MG/1
100 TABLET ORAL NIGHTLY
Qty: 180 TABLET | Refills: 3 | Status: SHIPPED | OUTPATIENT
Start: 2019-11-28 | End: 2020-05-23 | Stop reason: SDUPTHER

## 2020-02-27 ENCOUNTER — OFFICE VISIT (OUTPATIENT)
Dept: FAMILY MEDICINE CLINIC | Age: 70
End: 2020-02-27
Payer: MEDICARE

## 2020-02-27 VITALS
WEIGHT: 206.2 LBS | SYSTOLIC BLOOD PRESSURE: 118 MMHG | TEMPERATURE: 97.2 F | HEIGHT: 72 IN | HEART RATE: 83 BPM | DIASTOLIC BLOOD PRESSURE: 68 MMHG | BODY MASS INDEX: 27.93 KG/M2 | OXYGEN SATURATION: 97 %

## 2020-02-27 PROBLEM — F33.41 RECURRENT MAJOR DEPRESSIVE DISORDER IN PARTIAL REMISSION (HCC): Status: ACTIVE | Noted: 2020-02-27

## 2020-02-27 PROCEDURE — G8427 DOCREV CUR MEDS BY ELIG CLIN: HCPCS | Performed by: FAMILY MEDICINE

## 2020-02-27 PROCEDURE — G8417 CALC BMI ABV UP PARAM F/U: HCPCS | Performed by: FAMILY MEDICINE

## 2020-02-27 PROCEDURE — G8484 FLU IMMUNIZE NO ADMIN: HCPCS | Performed by: FAMILY MEDICINE

## 2020-02-27 PROCEDURE — 3017F COLORECTAL CA SCREEN DOC REV: CPT | Performed by: FAMILY MEDICINE

## 2020-02-27 PROCEDURE — 1123F ACP DISCUSS/DSCN MKR DOCD: CPT | Performed by: FAMILY MEDICINE

## 2020-02-27 PROCEDURE — 1036F TOBACCO NON-USER: CPT | Performed by: FAMILY MEDICINE

## 2020-02-27 PROCEDURE — 4040F PNEUMOC VAC/ADMIN/RCVD: CPT | Performed by: FAMILY MEDICINE

## 2020-02-27 PROCEDURE — 99214 OFFICE O/P EST MOD 30 MIN: CPT | Performed by: FAMILY MEDICINE

## 2020-02-27 NOTE — PROGRESS NOTES
Patient: Olamide Benjamin    YOB: 1950    Date: 2/27/20    Chief Complaint   Patient presents with    Hyperlipidemia     Patient presents today to follow up on hyperlipidemia. Patient Active Problem List    Diagnosis Date Noted    Recurrent major depressive disorder in partial remission ([facility] [address] 02/27/2020    Has a tremor     Anxiety, generalized     Arthritis, lumbar spine 02/12/2019    Hypothyroidism due to acquired atrophy of thyroid 02/12/2018    Hypercholesterolemia 12/06/2016    Depression     Psychophysiological insomnia        Allergies   Allergen Reactions    Food Other (See Comments)     Burning sensation in stomach    Lactose Intolerance (Gi) Other (See Comments)     Sour stomach    Nsaids      Decreased kidney function       Vitals:    02/27/20 1525   BP: 118/68   Site: Right Upper Arm   Position: Sitting   Cuff Size: Medium Adult   Pulse: 83   Temp: 97.2 °F (36.2 °C)   TempSrc: Temporal   SpO2: 97%   Weight: 206 lb 3.2 oz (93.5 kg)   Height: 6' (1.829 m)     Body mass index is 27.97 kg/m². HPI    He is following up on some of his chronic chronic issues but mostly he want to talk about the fact that his anger control problems are not getting any better. He declined seeing any additional psychiatrist or psychologist because of cost.  He has gone to [facility] in the past he has had both many medications evaluations treatments and therapies. Fortunately he is not violent. He just gets extremely explosive in the office he can do to control it is to walk away. He feels well but his back hurts a great deal at the locks constantly and if he bends over he really cannot even get back up because it hurts so much. No numbness tingling or weakness no loss of bowel or bladder control but he is frustrated with the chronic pain.   He has been told not to take nonsteroidal medications because of his kidney function and Tylenol does not help    Review of Systems    Constitutional:

## 2020-02-27 NOTE — LETTER
[facility]  [address] [facility] [address]  Phone: [phone]  Fax: [phone]    Arcelia Paniagua MD        February 27, 2020     Patient: Dutch Espinal   YOB: 1950   Date of Visit: 2/27/2020       The above mentioned patient of mine has arthritis in his back that require him to take a 15 minute break every 2 hours to allow the back to relax and not spasm. If you have any questions or concerns, please don't hesitate to call.       Sincerely,        Arcelia Paniagua MD

## 2020-03-03 DIAGNOSIS — E78.00 HYPERCHOLESTEROLEMIA: ICD-10-CM

## 2020-03-03 LAB
ALBUMIN SERPL-MCNC: 4.2 G/DL (ref 3.5–4.6)
ALP BLD-CCNC: 95 U/L (ref 35–104)
ALT SERPL-CCNC: 20 U/L (ref 0–41)
ANION GAP SERPL CALCULATED.3IONS-SCNC: 13 MEQ/L (ref 9–15)
AST SERPL-CCNC: 22 U/L (ref 0–40)
BILIRUB SERPL-MCNC: 0.5 MG/DL (ref 0.2–0.7)
BUN BLDV-MCNC: 14 MG/DL (ref 8–23)
CALCIUM SERPL-MCNC: 9 MG/DL (ref 8.5–9.9)
CHLORIDE BLD-SCNC: 102 MEQ/L (ref 95–107)
CO2: 27 MEQ/L (ref 20–31)
CREAT SERPL-MCNC: 1.38 MG/DL (ref 0.7–1.2)
GFR AFRICAN AMERICAN: >60
GFR NON-AFRICAN AMERICAN: 50.7
GLOBULIN: 2.5 G/DL (ref 2.3–3.5)
GLUCOSE BLD-MCNC: 88 MG/DL (ref 70–99)
POTASSIUM SERPL-SCNC: 4.3 MEQ/L (ref 3.4–4.9)
SODIUM BLD-SCNC: 142 MEQ/L (ref 135–144)
TOTAL PROTEIN: 6.7 G/DL (ref 6.3–8)

## 2020-03-26 RX ORDER — BUSPIRONE HYDROCHLORIDE 10 MG/1
10 TABLET ORAL 3 TIMES DAILY
Qty: 90 TABLET | Refills: 2 | Status: SHIPPED | OUTPATIENT
Start: 2020-03-26 | End: 2020-07-06

## 2020-05-22 NOTE — TELEPHONE ENCOUNTER
Patient is requesting medication refill.  Please approve or deny this request.    Rx requested:  Requested Prescriptions     Pending Prescriptions Disp Refills    primidone (MYSOLINE) 50 MG tablet 180 tablet 3     Sig: Take 2 tablets by mouth nightly         Last Office Visit:   11/27/2019      Next Visit Date:  Future Appointments   Date Time Provider Hemant Berry   5/27/2020  2:15 PM MD Gayatri Tracy   8/27/2020  3:15 PM Wen Rachel MD [address] [place] [address]

## 2020-05-23 RX ORDER — PRIMIDONE 50 MG/1
100 TABLET ORAL NIGHTLY
Qty: 180 TABLET | Refills: 3 | Status: SHIPPED | OUTPATIENT
Start: 2020-05-23 | End: 2021-06-01

## 2020-06-03 ENCOUNTER — OFFICE VISIT (OUTPATIENT)
Dept: NEUROLOGY | Age: 70
End: 2020-06-03
Payer: MEDICARE

## 2020-06-03 VITALS
HEART RATE: 76 BPM | BODY MASS INDEX: 27.3 KG/M2 | WEIGHT: 201.3 LBS | DIASTOLIC BLOOD PRESSURE: 75 MMHG | SYSTOLIC BLOOD PRESSURE: 107 MMHG

## 2020-06-03 PROCEDURE — 1123F ACP DISCUSS/DSCN MKR DOCD: CPT | Performed by: PSYCHIATRY & NEUROLOGY

## 2020-06-03 PROCEDURE — G8417 CALC BMI ABV UP PARAM F/U: HCPCS | Performed by: PSYCHIATRY & NEUROLOGY

## 2020-06-03 PROCEDURE — 99214 OFFICE O/P EST MOD 30 MIN: CPT | Performed by: PSYCHIATRY & NEUROLOGY

## 2020-06-03 PROCEDURE — 4040F PNEUMOC VAC/ADMIN/RCVD: CPT | Performed by: PSYCHIATRY & NEUROLOGY

## 2020-06-03 PROCEDURE — G8427 DOCREV CUR MEDS BY ELIG CLIN: HCPCS | Performed by: PSYCHIATRY & NEUROLOGY

## 2020-06-03 PROCEDURE — 1036F TOBACCO NON-USER: CPT | Performed by: PSYCHIATRY & NEUROLOGY

## 2020-06-03 PROCEDURE — 3017F COLORECTAL CA SCREEN DOC REV: CPT | Performed by: PSYCHIATRY & NEUROLOGY

## 2020-06-03 ASSESSMENT — ENCOUNTER SYMPTOMS
TROUBLE SWALLOWING: 0
COLOR CHANGE: 0
VOMITING: 0
PHOTOPHOBIA: 0
NAUSEA: 0
CHOKING: 0
BACK PAIN: 0
SHORTNESS OF BREATH: 0

## 2020-06-03 NOTE — PROGRESS NOTES
Subjective:      Patient ID: Sudhir Hamilton is a 65 y.o. male who presents today for:  Chief Complaint   Patient presents with    6 Month Follow-Up     Patient states that his tremor is not as bad. He is still having trouble with his writting. HPI 68-year-old right-handed male with a history of tremors with anxiety. Patient is actually doing well on Mysoline 100 mg with addition of buspirone. Patient's main issue today appears to be back pain we have not intervene that he was seeing a pain specialist and that the injection he feels that he may hit a nerve. And had injections and has not had radio frequency yet. She had an injection done and he feels that after this he has more back pain and is questioning if it could he tender. In any case the patient has back pain for many years having claudication actually and he has not any mid studies. This is bothersome to him as he is having difficulty walking now.     Past Medical History:   Diagnosis Date    Anemia     Anxiety, generalized     BPH (benign prostatic hyperplasia)     CRF (chronic renal failure)     stage 3 -   5/24/17 patient denies    Depression     Has a tremor     HIGH CHOLESTEROL     Hypothyroid     IBD (inflammatory bowel disease)     Insomnia     Kidney stones     Seasonal allergic conjunctivitis     Tinnitus     chronic      Past Surgical History:   Procedure Laterality Date    COLONOSCOPY      KNEE SURGERY      Bilat- knee,legs    HI COLON CA SCRN NOT HI RSK IND N/A 5/24/2017    COLONOSCOPY performed by Dylon Lomax MD at [address] [facility] shoulder repair    THYROID SURGERY  1999     Social History     Socioeconomic History    Marital status:      Spouse name: Not on file    Number of children: Not on file    Years of education: Not on file    Highest education level: Not on file   Occupational History    Not on file   Social Needs    Financial resource strain: Not on file    Food insecurity     Worry: Not on file     Inability: Not on file    Transportation needs     Medical: Not on file     Non-medical: Not on file   Tobacco Use    Smoking status: Former Smoker     Packs/day: 0.50     Years: 30.00     Pack years: 15.00     Types: Pipe     Last attempt to quit: 2002     Years since quittin.4    Smokeless tobacco: Never Used   Substance and Sexual Activity    Alcohol use: Yes     Comment: Occasional    Drug use: No    Sexual activity: Yes     Partners: Female   Lifestyle    Physical activity     Days per week: Not on file     Minutes per session: Not on file    Stress: Not on file   Relationships    Social connections     Talks on phone: Not on file     Gets together: Not on file     Attends Scientologist service: Not on file     Active member of club or organization: Not on file     Attends meetings of clubs or organizations: Not on file     Relationship status: Not on file    Intimate partner violence     Fear of current or ex partner: Not on file     Emotionally abused: Not on file     Physically abused: Not on file     Forced sexual activity: Not on file   Other Topics Concern    Not on file   Social History Narrative    Not on file     Family History   Adopted:  Yes     Allergies   Allergen Reactions    Food Other (See Comments)     Burning sensation in stomach    Lactose Intolerance (Gi) Other (See Comments)     Sour stomach    Nsaids      Decreased kidney function       Current Outpatient Medications   Medication Sig Dispense Refill    primidone (MYSOLINE) 50 MG tablet Take 2 tablets by mouth nightly 180 tablet 3    busPIRone (BUSPAR) 10 MG tablet Take 1 tablet by mouth 3 times daily 90 tablet 2    levothyroxine (EUTHYROX) 100 MCG tablet TAKE 1 TABLET BY MOUTH ONCE DAILY 90 tablet 3    atorvastatin (LIPITOR) 40 MG tablet TAKE ONE TABLET BY MOUTH NIGHTLY 90 tablet 3    potassium citrate (UROCIT-K) 10 MEQ (1080 MG) extended release tablet Take Cranial Nerves: No cranial nerve deficit. Sensory: No sensory deficit. Motor: No abnormal muscle tone. Coordination: Coordination normal.      Deep Tendon Reflexes: Reflexes are normal and symmetric. Babinski sign absent on the right side. Babinski sign absent on the left side. Psychiatric:         Mood and Affect: Mood normal.         No results found. Lab Results   Component Value Date    WBC 7.3 10/12/2017    RBC 4.61 10/12/2017    HGB 15.0 10/12/2017    HCT 44.0 10/12/2017    MCV 95.3 10/12/2017    MCH 32.5 10/12/2017    MCHC 34.1 10/12/2017    RDW 12.8 10/12/2017     10/12/2017    MPV 9.8 05/05/2014     Lab Results   Component Value Date     03/03/2020    K 4.3 03/03/2020     03/03/2020    CO2 27 03/03/2020    BUN 14 03/03/2020    CREATININE 1.38 03/03/2020    GFRAA >60.0 03/03/2020    LABGLOM 50.7 03/03/2020    GLUCOSE 88 03/03/2020    PROT 6.7 03/03/2020    LABALBU 4.2 03/03/2020    CALCIUM 9.0 03/03/2020    BILITOT 0.5 03/03/2020    ALKPHOS 95 03/03/2020    AST 22 03/03/2020    ALT 20 03/03/2020     Lab Results   Component Value Date    PROTIME 10.7 05/05/2014    INR 1.0 05/05/2014     Lab Results   Component Value Date    TSH 0.877 09/24/2019    FHFVFWRQ28 766 06/12/2013     Lab Results   Component Value Date    TRIG 102 09/24/2019    HDL 38 09/24/2019    LDLCALC 96 09/24/2019     No results found for: Hilda Sow, LABBENZ, CANNAB, COCAINESCRN, LABMETH, OPIATESCREENURINE, PHENCYCLIDINESCREENURINE, PPXUR, ETOH  No results found for: LITHIUM, DILFRTOT, VALPROATE    Assessment:       Diagnosis Orders   1. Essential tremor     2. Spinal stenosis of lumbar region with neurogenic claudication  MRI Brain WO Contrast   Essential tremor with anxiety and stress. This is responded very well to Mysoline 100 mg daily with addition of buspirone. The tremors are minimal.  His main issues appears to be of lumbar radiculopathy.   He has been seen by pain management with

## 2020-06-09 RX ORDER — ATORVASTATIN CALCIUM 40 MG/1
TABLET, FILM COATED ORAL
Qty: 90 TABLET | Refills: 3 | Status: SHIPPED | OUTPATIENT
Start: 2020-06-09 | End: 2021-06-15 | Stop reason: SDUPTHER

## 2020-06-29 ENCOUNTER — HOSPITAL ENCOUNTER (OUTPATIENT)
Dept: MRI IMAGING | Age: 70
Discharge: HOME | End: 2020-07-01
Payer: MEDICARE

## 2020-06-29 PROCEDURE — 72148 MRI LUMBAR SPINE W/O DYE: CPT

## 2020-07-06 ENCOUNTER — TELEPHONE (OUTPATIENT)
Dept: NEUROLOGY | Age: 70
End: 2020-07-06

## 2020-07-06 RX ORDER — BUSPIRONE HYDROCHLORIDE 10 MG/1
TABLET ORAL
Qty: 90 TABLET | Refills: 3 | Status: SHIPPED | OUTPATIENT
Start: 2020-07-06 | End: 2020-11-10

## 2020-07-07 NOTE — TELEPHONE ENCOUNTER
Advised patient of results, he wanted to know if there is anything he can do for the arthritis or is this something he has to live with.   Please advise    Thanks  Yoly Carpio

## 2020-08-27 ENCOUNTER — OFFICE VISIT (OUTPATIENT)
Dept: FAMILY MEDICINE CLINIC | Age: 70
End: 2020-08-27
Payer: MEDICARE

## 2020-08-27 VITALS
WEIGHT: 201 LBS | BODY MASS INDEX: 27.22 KG/M2 | DIASTOLIC BLOOD PRESSURE: 70 MMHG | OXYGEN SATURATION: 96 % | HEIGHT: 72 IN | HEART RATE: 88 BPM | RESPIRATION RATE: 16 BRPM | TEMPERATURE: 98.2 F | SYSTOLIC BLOOD PRESSURE: 110 MMHG

## 2020-08-27 PROCEDURE — G8427 DOCREV CUR MEDS BY ELIG CLIN: HCPCS | Performed by: FAMILY MEDICINE

## 2020-08-27 PROCEDURE — 99214 OFFICE O/P EST MOD 30 MIN: CPT | Performed by: FAMILY MEDICINE

## 2020-08-27 PROCEDURE — G8417 CALC BMI ABV UP PARAM F/U: HCPCS | Performed by: FAMILY MEDICINE

## 2020-08-27 PROCEDURE — 1123F ACP DISCUSS/DSCN MKR DOCD: CPT | Performed by: FAMILY MEDICINE

## 2020-08-27 PROCEDURE — 3017F COLORECTAL CA SCREEN DOC REV: CPT | Performed by: FAMILY MEDICINE

## 2020-08-27 PROCEDURE — 1036F TOBACCO NON-USER: CPT | Performed by: FAMILY MEDICINE

## 2020-08-27 PROCEDURE — 4040F PNEUMOC VAC/ADMIN/RCVD: CPT | Performed by: FAMILY MEDICINE

## 2020-08-27 NOTE — LETTER
[facility]  [address] [facility] [address]  Phone: [phone]  Fax: [phone]    Maryana Prado MD        August 27, 2020    Manuel Valerio  [facility] [address] [address]      To whom it may concern:'    Please excuse this patient from [facility] as he has severe arthritis in his back and cannot stand or sit long enough to participate. If you have any questions or concerns, please don't hesitate to call.     Sincerely,        Maryana Prado MD

## 2020-08-27 NOTE — PROGRESS NOTES
treatment for his back pain and he is not interested in physical therapy at this time or seeing a different doctor. He is due for blood work to be done and he is otherwise without complaints    Review of Systems    Constitutional: Negative for fatigue, fever and sweats. HEENT: Negative for eye discharge and vision loss. Negative for ear drainage, hearing loss and nasal drainage. Respiratory: positive for cough, negative for dyspnea and wheezing. Cardiovascular:  Negative for chest pain, claudication and irregular heartbeat/palpitations. Gastrointestinal: Negative for abdominal pain, nausea, vomiting, constipation and diarrhea. Genitourinary: No dysuria or penile discharge. Metabolic/Endocrine: Negative for cold intolerance, heat intolerance, polydipsia and polyphagia. No unintended weight loss or gain. Neuro/Psychiatric: Negative for gait disturbance. Negative for psychiatric symptoms. Dermatologic: Negative for pruritus and rash. Musculoskeletal: positive for bone/joint symptoms. No numbness or tingling. No weakness. Hematology: Negative for bleeding and easy bruising. Immunology:  Negative for environmental allergies and food allergies. Physical Exam    Patient's medication, allergies, past medical, surgical, social and family histories were reviewed and updated as appropriate. PHYSICAL EXAM   General appearance: Alert oriented pleasant cooperative no acute distress. Lungs: Clear to auscultation without wheezes rhonchi or rales  Heart: Regular rate and rhythm without murmurs rubs or gallops  Extremities: No rashes or edema neurologically grossly intact when he gets up from a sitting position he is very slow to straighten. Assessment:   Diagnosis Orders   1. Hypothyroidism due to acquired atrophy of thyroid  Comprehensive Metabolic Panel    TSH Without Reflex   2. Hypercholesterolemia  Lipid, Fasting   3. Screening for prostate cancer  PSA Screening   4.  Recurrent major depressive disorder in partial remission (HCC)   stable   5. Has a tremor   per neurology   6. Arthritis, lumbar spine   once again I recommended physical therapy for him he needs heat muscle buildup flexibility and he is to think about this. If necessary we could consider something like a referral to Dr. Johnny Dominguez. Plan:  Current Outpatient Medications   Medication Sig Dispense Refill    levothyroxine (EUTHYROX) 100 MCG tablet Take 1 tablet by mouth once daily 90 tablet 3    busPIRone (BUSPAR) 10 MG tablet TAKE 1 TABLET BY MOUTH THREE TIMES DAILY 90 tablet 3    atorvastatin (LIPITOR) 40 MG tablet TAKE ONE TABLET BY MOUTH NIGHTLY 90 tablet 3    primidone (MYSOLINE) 50 MG tablet Take 2 tablets by mouth nightly 180 tablet 3    potassium citrate (UROCIT-K) 10 MEQ (1080 MG) extended release tablet Take 10 mEq by mouth 3 times daily (with meals)      traZODone (DESYREL) 50 MG tablet Take 1 tablet by mouth nightly as needed for Sleep 90 tablet 1    hydrocortisone 2.5 % cream Apply topically 2 times daily. 453 g 0    Multiple Vitamins-Minerals (MENS 50+ MULTI VITAMIN/MIN PO) Take 1 tablet by mouth daily       No current facility-administered medications for this visit. Orders Placed This Encounter   Procedures    Lipid, Fasting     Standing Status:   Future     Standing Expiration Date:   8/27/2021    Comprehensive Metabolic Panel     Standing Status:   Future     Standing Expiration Date:   8/27/2021    TSH Without Reflex     Standing Status:   Future     Standing Expiration Date:   8/27/2021    PSA Screening     Standing Status:   Future     Standing Expiration Date:   8/27/2021       No orders of the defined types were placed in this encounter. Return in about 6 months (around 2/27/2021).     Dr. Angie Wilson      8/27/20  4:02 PM

## 2020-09-04 DIAGNOSIS — Z12.5 SCREENING FOR PROSTATE CANCER: ICD-10-CM

## 2020-09-04 DIAGNOSIS — E03.4 HYPOTHYROIDISM DUE TO ACQUIRED ATROPHY OF THYROID: ICD-10-CM

## 2020-09-04 DIAGNOSIS — E78.00 HYPERCHOLESTEROLEMIA: ICD-10-CM

## 2020-09-04 LAB
ALBUMIN SERPL-MCNC: 4.1 G/DL (ref 3.5–4.6)
ALP BLD-CCNC: 102 U/L (ref 35–104)
ALT SERPL-CCNC: 22 U/L (ref 0–41)
ANION GAP SERPL CALCULATED.3IONS-SCNC: 11 MEQ/L (ref 9–15)
AST SERPL-CCNC: 19 U/L (ref 0–40)
BILIRUB SERPL-MCNC: 0.6 MG/DL (ref 0.2–0.7)
BUN BLDV-MCNC: 13 MG/DL (ref 8–23)
CALCIUM SERPL-MCNC: 9.3 MG/DL (ref 8.5–9.9)
CHLORIDE BLD-SCNC: 106 MEQ/L (ref 95–107)
CHOLESTEROL, FASTING: 143 MG/DL (ref 0–199)
CO2: 26 MEQ/L (ref 20–31)
CREAT SERPL-MCNC: 1.24 MG/DL (ref 0.7–1.2)
GFR AFRICAN AMERICAN: >60
GFR NON-AFRICAN AMERICAN: 57.3
GLOBULIN: 2.3 G/DL (ref 2.3–3.5)
GLUCOSE BLD-MCNC: 77 MG/DL (ref 70–99)
HDLC SERPL-MCNC: 39 MG/DL (ref 40–59)
LDL CHOLESTEROL CALCULATED: 87 MG/DL (ref 0–129)
POTASSIUM SERPL-SCNC: 4.9 MEQ/L (ref 3.4–4.9)
PROSTATE SPECIFIC ANTIGEN: 1.15 NG/ML (ref 0–6.22)
SODIUM BLD-SCNC: 143 MEQ/L (ref 135–144)
TOTAL PROTEIN: 6.4 G/DL (ref 6.3–8)
TRIGLYCERIDE, FASTING: 85 MG/DL (ref 0–150)
TSH SERPL DL<=0.05 MIU/L-ACNC: 0.37 UIU/ML (ref 0.44–3.86)

## 2020-09-07 RX ORDER — LEVOTHYROXINE SODIUM 88 UG/1
TABLET ORAL
Qty: 90 TABLET | Refills: 0 | Status: SHIPPED | OUTPATIENT
Start: 2020-09-07 | End: 2020-12-03

## 2020-09-09 ENCOUNTER — TELEPHONE (OUTPATIENT)
Dept: FAMILY MEDICINE CLINIC | Age: 70
End: 2020-09-09

## 2020-09-09 NOTE — TELEPHONE ENCOUNTER
Patient is asking if you would provide a letter so he can be dismissed from [place] [facility] 9/18/20 -10/2/20 at the [facility]. Patient is stating that with his use of o2, breathing issues,back problems and that he can't sit or stand for any length of time he would not be able to participate.        Please advise   Thank you

## 2020-11-10 RX ORDER — BUSPIRONE HYDROCHLORIDE 10 MG/1
TABLET ORAL
Qty: 90 TABLET | Refills: 0 | Status: SHIPPED | OUTPATIENT
Start: 2020-11-10 | End: 2020-12-01 | Stop reason: SDUPTHER

## 2020-11-12 DIAGNOSIS — E03.4 HYPOTHYROIDISM DUE TO ACQUIRED ATROPHY OF THYROID: ICD-10-CM

## 2020-11-12 LAB — TSH SERPL DL<=0.05 MIU/L-ACNC: 1.43 UIU/ML (ref 0.44–3.86)

## 2020-12-01 RX ORDER — BUSPIRONE HYDROCHLORIDE 10 MG/1
TABLET ORAL
Qty: 90 TABLET | Refills: 1 | Status: SHIPPED | OUTPATIENT
Start: 2020-12-01 | End: 2021-02-16

## 2020-12-03 RX ORDER — LEVOTHYROXINE SODIUM 88 UG/1
TABLET ORAL
Qty: 90 TABLET | Refills: 3 | Status: SHIPPED | OUTPATIENT
Start: 2020-12-03 | End: 2021-11-29 | Stop reason: SDUPTHER

## 2021-02-08 ENCOUNTER — TELEPHONE (OUTPATIENT)
Dept: FAMILY MEDICINE CLINIC | Age: 71
End: 2021-02-08

## 2021-02-16 RX ORDER — BUSPIRONE HYDROCHLORIDE 10 MG/1
TABLET ORAL
Qty: 90 TABLET | Refills: 0 | Status: SHIPPED | OUTPATIENT
Start: 2021-02-16 | End: 2021-03-22 | Stop reason: SDUPTHER

## 2021-02-25 ENCOUNTER — OFFICE VISIT (OUTPATIENT)
Dept: FAMILY MEDICINE CLINIC | Age: 71
End: 2021-02-25
Payer: MEDICARE

## 2021-02-25 VITALS
DIASTOLIC BLOOD PRESSURE: 60 MMHG | TEMPERATURE: 98.3 F | HEIGHT: 72 IN | WEIGHT: 203 LBS | HEART RATE: 84 BPM | SYSTOLIC BLOOD PRESSURE: 106 MMHG | BODY MASS INDEX: 27.5 KG/M2

## 2021-02-25 VITALS
SYSTOLIC BLOOD PRESSURE: 106 MMHG | OXYGEN SATURATION: 96 % | DIASTOLIC BLOOD PRESSURE: 60 MMHG | HEART RATE: 84 BPM | RESPIRATION RATE: 16 BRPM | HEIGHT: 72 IN | TEMPERATURE: 98.3 F | WEIGHT: 203 LBS | BODY MASS INDEX: 27.5 KG/M2

## 2021-02-25 DIAGNOSIS — M80.08XA AGE-RELATED OSTEOPOROSIS WITH CURRENT PATHOLOGICAL FRACTURE, VERTEBRA(E), INITIAL ENCOUNTER FOR FRACTURE (HCC): ICD-10-CM

## 2021-02-25 DIAGNOSIS — F51.04 PSYCHOPHYSIOLOGICAL INSOMNIA: ICD-10-CM

## 2021-02-25 DIAGNOSIS — E78.5 HYPERLIPIDEMIA, UNSPECIFIED HYPERLIPIDEMIA TYPE: ICD-10-CM

## 2021-02-25 DIAGNOSIS — I10 HYPERTENSION, UNSPECIFIED TYPE: ICD-10-CM

## 2021-02-25 DIAGNOSIS — Z00.00 ROUTINE GENERAL MEDICAL EXAMINATION AT A HEALTH CARE FACILITY: Primary | ICD-10-CM

## 2021-02-25 DIAGNOSIS — R53.83 FATIGUE, UNSPECIFIED TYPE: ICD-10-CM

## 2021-02-25 DIAGNOSIS — M47.819 ARTHRITIS OF SPINE: ICD-10-CM

## 2021-02-25 DIAGNOSIS — M54.6 CHRONIC MIDLINE THORACIC BACK PAIN: ICD-10-CM

## 2021-02-25 DIAGNOSIS — E03.4 HYPOTHYROIDISM DUE TO ACQUIRED ATROPHY OF THYROID: Primary | ICD-10-CM

## 2021-02-25 DIAGNOSIS — M80.00XD OSTEOPOROSIS WITH PATHOLOGICAL FRACTURE, WITH ROUTINE HEALING, SUBSEQUENT ENCOUNTER: ICD-10-CM

## 2021-02-25 DIAGNOSIS — G89.29 CHRONIC MIDLINE THORACIC BACK PAIN: ICD-10-CM

## 2021-02-25 PROCEDURE — 3017F COLORECTAL CA SCREEN DOC REV: CPT | Performed by: FAMILY MEDICINE

## 2021-02-25 PROCEDURE — G8482 FLU IMMUNIZE ORDER/ADMIN: HCPCS | Performed by: FAMILY MEDICINE

## 2021-02-25 PROCEDURE — G0439 PPPS, SUBSEQ VISIT: HCPCS | Performed by: FAMILY MEDICINE

## 2021-02-25 PROCEDURE — G8417 CALC BMI ABV UP PARAM F/U: HCPCS | Performed by: FAMILY MEDICINE

## 2021-02-25 PROCEDURE — G8427 DOCREV CUR MEDS BY ELIG CLIN: HCPCS | Performed by: FAMILY MEDICINE

## 2021-02-25 PROCEDURE — 99214 OFFICE O/P EST MOD 30 MIN: CPT | Performed by: FAMILY MEDICINE

## 2021-02-25 PROCEDURE — 1123F ACP DISCUSS/DSCN MKR DOCD: CPT | Performed by: FAMILY MEDICINE

## 2021-02-25 PROCEDURE — 4040F PNEUMOC VAC/ADMIN/RCVD: CPT | Performed by: FAMILY MEDICINE

## 2021-02-25 PROCEDURE — 1036F TOBACCO NON-USER: CPT | Performed by: FAMILY MEDICINE

## 2021-02-25 RX ORDER — TRAZODONE HYDROCHLORIDE 100 MG/1
100 TABLET ORAL NIGHTLY PRN
Qty: 90 TABLET | Refills: 1
Start: 2021-02-25

## 2021-02-25 SDOH — ECONOMIC STABILITY: INCOME INSECURITY: HOW HARD IS IT FOR YOU TO PAY FOR THE VERY BASICS LIKE FOOD, HOUSING, MEDICAL CARE, AND HEATING?: NOT HARD AT ALL

## 2021-02-25 SDOH — ECONOMIC STABILITY: FOOD INSECURITY: WITHIN THE PAST 12 MONTHS, THE FOOD YOU BOUGHT JUST DIDN'T LAST AND YOU DIDN'T HAVE MONEY TO GET MORE.: NEVER TRUE

## 2021-02-25 SDOH — ECONOMIC STABILITY: TRANSPORTATION INSECURITY
IN THE PAST 12 MONTHS, HAS LACK OF TRANSPORTATION KEPT YOU FROM MEETINGS, WORK, OR FROM GETTING THINGS NEEDED FOR DAILY LIVING?: NO

## 2021-02-25 SDOH — ECONOMIC STABILITY: TRANSPORTATION INSECURITY
IN THE PAST 12 MONTHS, HAS THE LACK OF TRANSPORTATION KEPT YOU FROM MEDICAL APPOINTMENTS OR FROM GETTING MEDICATIONS?: NO

## 2021-02-25 ASSESSMENT — PATIENT HEALTH QUESTIONNAIRE - PHQ9
SUM OF ALL RESPONSES TO PHQ QUESTIONS 1-9: 0
SUM OF ALL RESPONSES TO PHQ9 QUESTIONS 1 & 2: 0
SUM OF ALL RESPONSES TO PHQ9 QUESTIONS 1 & 2: 0
SUM OF ALL RESPONSES TO PHQ QUESTIONS 1-9: 0
1. LITTLE INTEREST OR PLEASURE IN DOING THINGS: 0

## 2021-02-25 NOTE — PROGRESS NOTES
Medicare Annual Wellness Visit  Name: Mone Estrada Date: 2021   MRN: [id number] Sex: Male   Age: 65 y.o. Ethnicity: Non-/Non    : 1950 Race: Meredith Gaytan is here for Medicare AWV (Here for Medicare AWV)    Screenings for behavioral, psychosocial and functional/safety risks, and cognitive dysfunction are all negative except as indicated below. These results, as well as other patient data from the [address] form, are documented in Flowsheets linked to this Encounter. Allergies   Allergen Reactions    Food Other (See Comments)     Burning sensation in stomach    Lactose Intolerance (Gi) Other (See Comments)     Sour stomach    Nsaids      Decreased kidney function         Prior to Visit Medications    Medication Sig Taking? Authorizing Provider   traZODone (DESYREL) 100 MG tablet Take 1 tablet by mouth nightly as needed for Sleep  Deisy Regalado MD   busPIRone (BUSPAR) 10 MG tablet TAKE 1 TABLET BY MOUTH THREE TIMES DAILY  J Luis Shay MD   levothyroxine (EUTHYROX) 88 MCG tablet Take 1 tablet by mouth once daily  Deisy Regalado MD   atorvastatin (LIPITOR) 40 MG tablet TAKE ONE TABLET BY MOUTH NIGHTLY  Deisy Regalado MD   primidone (MYSOLINE) 50 MG tablet Take 2 tablets by mouth nightly  J Luis Shay MD   potassium citrate (UROCIT-K) 10 MEQ (1080 MG) extended release tablet Take 10 mEq by mouth 3 times daily (with meals)  Historical Provider, MD   hydrocortisone 2.5 % cream Apply topically 2 times daily.   Deisy Regalado MD   Multiple Vitamins-Minerals (MENS 50+ MULTI VITAMIN/MIN PO) Take 1 tablet by mouth daily  Historical Provider, MD         Past Medical History:   Diagnosis Date    Anemia     Anxiety, generalized     BPH (benign prostatic hyperplasia)     CRF (chronic renal failure)     stage 3 -   17 patient denies    Depression     Has a tremor     HIGH CHOLESTEROL     Hypothyroid  IBD (inflammatory bowel disease)     Insomnia     Kidney stones     Seasonal allergic conjunctivitis     Tinnitus     chronic        Past Surgical History:   Procedure Laterality Date    COLONOSCOPY      KNEE SURGERY      Bilat- knee,legs    TX COLON CA SCRN NOT HI RSK IND N/A 5/24/2017    COLONOSCOPY performed by Jose Armando Qureshi MD at [address] [facility] shoulder repair   [address] [facility]         Family History   Adopted: Yes       CareTeam (Including outside providers/suppliers regularly involved in providing care):   Patient Care Team:  Natalia Rush MD as PCP - General (Family Medicine)  Natalia Rush MD as PCP - [facility] Empaneled Provider  Carmita Scott MD as Physician (Urology)    Wt Readings from Last 3 Encounters:   02/25/21 203 lb (92.1 kg)   02/25/21 203 lb (92.1 kg)   08/27/20 201 lb (91.2 kg)     Vitals:    02/25/21 1507   BP: 106/60   Site: Left Upper Arm   Position: Sitting   Cuff Size: Large Adult   Pulse: 84   Temp: 98.3 °F (36.8 °C)   TempSrc: Oral   Weight: 203 lb (92.1 kg)   Height: 6' (1.829 m)     Body mass index is 27.53 kg/m². Based upon direct observation of the patient, evaluation of cognition reveals recent and remote memory intact. Patient's complete Health Risk Assessment and screening values have been reviewed and are found in Flowsheets. The following problems were reviewed today and where indicated follow up appointments were made and/or referrals ordered.     Positive Risk Factor Screenings with Interventions:            Hearing/Vision:  No exam data present  Hearing/Vision  Do you or your family notice any trouble with your hearing that hasn't been managed with hearing aids?: (!) Yes(has hearing aids, feels they do not work well)  Do you have difficulty driving, watching TV, or doing any of your daily activities because of your eyesight?: No  Have you had an eye exam within the past year?: Yes  Hearing/Vision Interventions: · Hearing concerns:  patient declines any further evaluation/treatment for hearing issues      Personalized Preventive Plan   Current Health Maintenance Status  Immunization History   Administered Date(s) Administered    Influenza 01/15/2013    Influenza Vaccine, unspecified formulation 09/23/2015    Influenza Virus Vaccine 10/07/2014, 08/07/2015    Influenza Whole 08/07/2015    Influenza, High Dose (Fluzone 65 yrs and older) 12/06/2016, 10/12/2017, 10/11/2018, 10/03/2019, 09/16/2020    Pneumococcal Conjugate 13-valent (Fisfxwy19) 08/09/2016    Pneumococcal Polysaccharide (Nviolnqoz98) 06/12/2013    Tdap (Boostrix, Adacel) 01/15/2013        Health Maintenance   Topic Date Due    COVID-19 Vaccine (1 of 2) 05[birth year]20/1964    Shingles Vaccine (1 of 2) 05/20/1998    Annual Wellness Visit (AWV)  05/29/2019    Lipid screen  09/04/2021    Colon cancer screen colonoscopy  05/24/2022    DTaP/Tdap/Td vaccine (2 - Td) 01/15/2023    Flu vaccine  Completed    Pneumococcal 65+ years Vaccine  Completed    AAA screen  Completed    Hepatitis C screen  Completed    Hepatitis A vaccine  Aged Out    Hepatitis B vaccine  Aged Out    Hib vaccine  Aged Out    Meningococcal (ACWY) vaccine  Aged Out     Recommendations for [employer] Due: see orders and patient instructions/AVS.  . Recommended screening schedule for the next 5-10 years is provided to the patient in written form: see Patient Instructions/AVS.    Nieves CAMARENA, LPN, 1/18/1125, performed the documented evaluation under the direct supervision of the attending physician. .This encounter was performed under my, Darryle Johns, MDs, direct supervision, 2/25/2021.

## 2021-02-25 NOTE — PATIENT INSTRUCTIONS
Personalized Preventive Plan for Jairo Ying - 2/25/2021  Medicare offers a range of preventive health benefits. Some of the tests and screenings are paid in full while other may be subject to a deductible, co-insurance, and/or copay. Some of these benefits include a comprehensive review of your medical history including lifestyle, illnesses that may run in your family, and various assessments and screenings as appropriate. After reviewing your medical record and screening and assessments performed today your provider may have ordered immunizations, labs, imaging, and/or referrals for you. A list of these orders (if applicable) as well as your Preventive Care list are included within your After Visit Summary for your review. Other Preventive Recommendations:    · A preventive eye exam performed by an eye specialist is recommended every 1-2 years to screen for glaucoma; cataracts, macular degeneration, and other eye disorders. · A preventive dental visit is recommended every 6 months. · Try to get at least 150 minutes of exercise per week or 10,000 steps per day on a pedometer . · Order or download the FREE \"Exercise & Physical Activity: Your Everyday Guide\" from [employer]. Call 7-324.338.6979 or search [employer] online. · You need 7182-4402 mg of calcium and 7582-5984 IU of vitamin D per day. It is possible to meet your calcium requirement with diet alone, but a vitamin D supplement is usually necessary to meet this goal.  · When exposed to the sun, use a sunscreen that protects against both UVA and UVB radiation with an SPF of 30 or greater. Reapply every 2 to 3 hours or after sweating, drying off with a towel, or swimming. · Always wear a seat belt when traveling in a car. Always wear a helmet when riding a bicycle or motorcycle. Heart-Healthy Diet   Sodium, Fat, and Cholesterol Controlled Diet       What Is a Heart Healthy Diet? Eat fish at least twice per week; the fish highest in omega-3 fatty acids and lowest in mercury include salmon, herring, mackerel, sardines, and canned chunk light tuna. If you eat fish less than twice per week or have high triglycerides, talk to your doctor about taking fish oil supplements. Read food labels. For products low in fat and cholesterol, look for fat free, low-fat, cholesterol free, saturated fat free, and trans fat freeAlso scan the Nutrition Facts Label, which lists saturated fat, trans fat, and cholesterol amounts. For products low in sodium, look for sodium free, very low sodium, low sodium, no added salt, and unsalted   Skip the salt when cooking or at the table; if food needs more flavor, get creative and try out different herbs and spices. Garlic and onion also add substantial flavor to foods. Trim any visible fat off meat and poultry before cooking, and drain the fat off after joshi. Use cooking methods that require little or no added fat, such as grilling, boiling, baking, poaching, broiling, roasting, steaming, stir-frying, and sauting. Avoid fast food and convenience food. They tend to be high in saturated and trans fat and have a lot of added salt. Talk to a registered dietitian for individualized diet advice. Last Reviewed: March 2011 Lissa Mena MS, MPH, RD   Updated: 3/29/2011   ·     Keep Your Memory Arabella Furnish       Many factors can affect your ability to remembera hectic lifestyle, aging, stress, chronic disease, and certain medicines. But, there are steps you can take to sharpen your mind and help preserve your memory. Challenge Your Brain   Regularly challenging your mind may help keeps it in top shape. Good mental exercises include:   Crossword puzzlesUse a dictionary if you need it; you will learn more that way. Brainteasers Try some!    Crafts, such as wood working and [facility], such as gardening and building model airplanes SocializingVisit old friends or join groups to meet new ones. Reading   Learning a new language   Taking a class, whether it be art history or carissa chi   TravelingExperience the food, history, and culture of your destination   Learning to use a computer   Going to museums, the theater, or thought-provoking movies   Changing things in your daily life, such as reversing your pattern in the grocery store or brushing your teeth using your nondominant hand   Use Memory Aids   There is no need to remember every detail on your own. These memory aids can help:   Calendars and day planners   Electronic organizers to store all sorts of helpful informationThese devices can \"beep\" to remind you of appointments. A book of days to record birthdays, anniversaries, and other occasions that occur on the same date every year   Detailed \"to-do\" lists and strategically placed sticky notes   Quick \"study\" sessionsBefore a gathering, review who will be there so their names will be fresh in your mind. Establish routinesFor example, keep your keys, wallet, and umbrella in the same place all the time or take medicine with your 8:00 AM glass of juice   Live a Healthy Life   Many actions that will keep your body strong will do the same for your mind. For example:   Talk to Your Doctor About Herbs and Supplements    Malnutrition and vitamin deficiencies can impair your mental function. For example, vitamin B12 deficiency can cause a range of symptoms, including confusion. But, what if your nutritional needs are being met? Can herbs and supplements still offer a benefit? Researchers have investigated a range of natural remedies, such as ginkgo , ginseng , and the supplement phosphatidylserine (PS). So far, though, the evidence is inconsistent as to whether these products can improve memory or thinking. If you are interested in taking herbs and supplements, talk to your doctor first because they may interact with other medicines that you are taking. Exercise Regularly    Among the many benefits of regular exercise are increased blood flow to the brain and decreased risk of certain diseases that can interfere with memory function. One study found that even moderate exercise has a beneficial effect. Examples of \"moderate\" exercise include:   Playing 18 holes of golf once a week, without a cart   Playing tennis twice a week   Walking one mile per day   Manage Stress    It can be tough to remember what is important when your mind is cluttered. Make time for relaxation. Choose activities that calm you down, and make it routine. Manage Chronic Conditions    Side effects of high blood pressure , diabetes, and heart disease can interfere with mental function. Many of the lifestyle steps discussed here can help manage these conditions. Strive to eat a healthy diet, exercise regularly, get stress under control, and follow your doctor's advice for your condition. Minimize Medications    Talk to your doctor about the medicines that you take. Some may be unnecessary. Also, healthy lifestyle habits may lower the need for certain drugs. Last Reviewed: April 2010 Cody Sheppard MD   Updated: 4/13/2010   ·     [address]       As we get older, changes in balance, gait, strength, vision, hearing, and cognition make even the most youthful senior more prone to accidents. Falls are one of the leading health risks for older people.  This increased risk of falling is related to:   Aging process (eg, decreased muscle strength, slowed reflexes)   Higher incidence of chronic health problems (eg, arthritis, diabetes) that may limit mobility, agility or sensory awareness Side effects of medicine (eg, dizziness, blurred vision)especially medicines like prescription pain medicines and drugs used to treat mental health conditions   Depending on the brittleness of your bones, the consequences of a fall can be serious and long lasting. Home Life   Research by the Association of Aging [facility]) shows that some home accidents among older adults can be prevented by making simple lifestyle changes and basic modifications and repairs to the home environment. Here are some lifestyle changes that experts recommend:   Have your hearing and vision checked regularly. Be sure to wear prescription glasses that are right for you. Speak to your doctor or pharmacist about the possible side effects of your medicines. A number of medicines can cause dizziness. If you have problems with sleep, talk to your doctor. Limit your intake of alcohol. If necessary, use a cane or walker to help maintain your balance. Wear supportive, rubber-soled shoes, even at home. If you live in a region that gets wintry weather, you may want to put special cleats on your shoes to prevent you from slipping on the snow and ice. Exercise regularly to help maintain muscle tone, agility, and balance. Always hold the banister when going up or down stairs. Also, use  bars when getting in or out of the bath or shower, or using the toilet. To avoid dizziness, get up slowly from a lying down position. Sit up first, dangling your legs for a minute or two before rising to a standing position. Overall Home Safety Check   According to the Consumer Product Safety Commision's \"Older Consumer Home Safety Checklist,\" it is important to check for potential hazards in each room. And remember, proper lighting is an essential factor in home safety. If you cannot see clearly, you are more likely to fall.    Important questions to ask yourself include: Are lamp, electric, extension, and telephone cords placed out of the flow of traffic and maintained in good condition? Have frayed cords been replaced? Are all small rugs and runners slip resistant? If not, you can secure them to the floor with a special double-sided carpet tape. Are smoke detectors properly locatedone on every floor of your home and one outside of every sleeping area? Are they in good working order? Are batteries replaced at least once a year? Do you have a well-maintained carbon monoxide detector outside every sleeping are in your home? Does your furniture layout leave plenty of space to maneuver between and around chairs, tables, beds, and sofas? Are hallways, stairs and passages between rooms well lit? Can you reach a lamp without getting out of bed? Are floor surfaces well maintained? Shag rugs, high-pile carpeting, tile floors, and polished wood floors can be particularly slippery. Stairs should always have handrails and be carpeted or fitted with a non-skid tread. Is your telephone easily reachable. Is the cord safely tucked away? Room by Room   According to the Association of Aging, bathrooms and paul are the two most potentially hazardous rooms in your home. In the Kitchen    Be sure your stove is in proper working order and always make sure burners and the oven are off before you go out or go to sleep. Keep pots on the back burners, turn handles away from the front of the stove, and keep stove clean and free of grease build-up. Kitchen ventilation systems and range exhausts should be working properly. Keep flammable objects such as towels and pot holders away from the cooking area except when in use. Make sure kitchen curtains are tied back. Move cords and appliances away from the sink and hot surfaces. If extension cords are needed, install wiring guides so they do not hang over the sink, range, or working areas. Look for coffee pots, kettles and toaster ovens with automatic shut-offs. Keep a mop handy in the kitchen so you can wipe up spills instantly. You should also have a small fire extinguisher. Arrange your kitchen with frequently used items on lower shelves to avoid the need to stand on a stepstool to reach them. Make sure countertops are well-lit to avoid injuries while cutting and preparing food. In the Bathroom    Use a non-slip mat or decals in the tub and shower, since wet, soapy tile or porcelain surfaces are extremely slippery. Make sure bathroom rugs are non-skid or tape them firmly to the floor. Bathtubs should have at least one, preferably two, grab bars, firmly attached to structural supports in the wall. (Do not use built-in soap holders or glass shower doors as grab bars.)    Tub seats fitted with non-slip material on the legs allow you to wash sitting down. For people with limited mobility, bathtub transfer benches allow you to slide safely into the tub. Raised toilet seats and toilet safety rails are helpful for those with knee or hip problems. In the [facility]    Make sure you use a nightlight and that the area around your bed is clear of potential obstacles. Be careful with electric blankets and never go to sleep with a heating pad, which can cause serious burns even if on a low setting. Use fire-resistant mattress covers and pillows, and NEVER smoke in bed. Keep a phone next to the bed that is programmed to dial 911 at the push of a button. If you have a chronic condition, you may want to sign on with an automatic call-in service. Typically the system includes a small pendant that connects directly to an emergency medical voice-response system. You should also make arrangements to stay in contact with someonefriend, neighbor, family memberon a regular schedule.    Fire Prevention According to the National S.A.F.E. (Smoke Alarms for Every) [address] [facility], senior citizens are one of the two highest risk groups for death and serious injuries due to residential fires. When cooking, wear short-sleeved items, never a bulky long-sleeved robe. The [facility]'s Safety Checklist for Older Consumers emphasizes the importance of checking basements, garages, workshops and storage areas for fire hazards, such as volatile liquids, piles of old rags or clothing and overloaded circuits. Never smoke in bed or when lying down on a couch or recliner chair. Small portable electric or kerosene heaters are responsible for many home fires and should be used cautiously if at all. If you do use one, be sure to keep them away from flammable materials. In case of fire, make sure you have a pre-established emergency exit plan. Have a professional check your fireplace and other fuel-burning appliances yearly. [employer]   Baby boomers entering the alvarez years will continue to see the development of new products to help older adults live safely and independently in spite of age-related changes. Making Life More Livable  , by Yaquelin Lewis, lists over 1,000 products for \"living well in the mature years,\" such as bathing and mobility aids, household security devices, ergonomically designed knives and peelers, and faucet valves and knobs for temperature control. Medical supply stores and organizations are good sources of information about products that improve your quality of life and insure your safety. Last Reviewed: November 2009 Dalila Melchor MD   Updated: 3/7/2011     ·   Personalized Preventive Plan for Sourav Foster - 2/25/2021  Medicare offers a range of preventive health benefits. Some of the tests and screenings are paid in full while other may be subject to a deductible, co-insurance, and/or copay.

## 2021-02-25 NOTE — PROGRESS NOTES
Patient: Elena Jackson (: 1950) is a 65 y.o. male,Established patient, here for evaluation of the following chief complaint(s):  Chief Complaint   Patient presents with    Hypothyroidism    Hyperlipidemia    Fatigue     Onset years. would like B12 Level checked       Date: 21    Allergies   Allergen Reactions    Food Other (See Comments)     Burning sensation in stomach    Lactose Intolerance (Gi) Other (See Comments)     Sour stomach    Nsaids      Decreased kidney function       Vitals:    21 1432   BP: 106/60   Site: Left Upper Arm   Position: Sitting   Cuff Size: Large Adult   Pulse: 84   Resp: 16   Temp: 98.3 °F (36.8 °C)   TempSrc: Oral   SpO2: 96%   Weight: 203 lb (92.1 kg)   Height: 6' (1.829 m)     Body mass index is 27.53 kg/m². PHQ Scores 2021 2021 10/1/2019 2018 2018 2016 2015   PHQ2 Score 0 0 1 0 0 6 2   PHQ9 Score 0 0 1 0 0 18 2     Interpretation of Total Score Depression Severity: 1-4 = Minimal depression, 5-9 = Mild depression, 10-14 = Moderate depression, 15-19 = Moderately severe depression, 20-27 = Severe depression    HPI    He would like to follow-up on his chronic medical illnesses and he is due for lab work. He is still very fatigued and would like to increase his trazodone at night. He has a lot of back pain he stands all day on concrete. He had an MRI ordered by Dr. Ko Rodriguez that showed a fracture which the patient was not aware. He has a lot of pain in his low back and in his upper back more towards his shoulder blades. No radiation no numbness no tingling no weakness. Review of Systems    Constitutional: Positive for fatigue,Negative for fever and sweats. HEENT: Negative for eye discharge and vision loss. Negative for ear drainage, hearing loss and nasal drainage. Respiratory: Negative for cough, dyspnea and wheezing. Cardiovascular:  Negative for chest pain, claudication and irregular heartbeat/palpitations. 8. Psychophysiological insomnia  traZODone (DESYREL) 100 MG tablet   9. Arthritis of spine   he would benefit from physical therapy he does have a work restriction not to allow him to rest while he is at work unfortunately he does not have insurance coverage for the physical therapy         On this date 02/25/21 I have spent 32 minutes reviewing previous notes, test results and face to face with the patient discussing the diagnosis and importance of compliance with the treatment plan. Plan:  Current Outpatient Medications   Medication Sig Dispense Refill    traZODone (DESYREL) 100 MG tablet Take 1 tablet by mouth nightly as needed for Sleep 90 tablet 1    busPIRone (BUSPAR) 10 MG tablet TAKE 1 TABLET BY MOUTH THREE TIMES DAILY 90 tablet 0    levothyroxine (EUTHYROX) 88 MCG tablet Take 1 tablet by mouth once daily 90 tablet 3    atorvastatin (LIPITOR) 40 MG tablet TAKE ONE TABLET BY MOUTH NIGHTLY 90 tablet 3    primidone (MYSOLINE) 50 MG tablet Take 2 tablets by mouth nightly 180 tablet 3    potassium citrate (UROCIT-K) 10 MEQ (1080 MG) extended release tablet Take 10 mEq by mouth 3 times daily (with meals)      hydrocortisone 2.5 % cream Apply topically 2 times daily. 453 g 0    Multiple Vitamins-Minerals (MENS 50+ MULTI VITAMIN/MIN PO) Take 1 tablet by mouth daily       No current facility-administered medications for this visit.       Orders Placed This Encounter   Procedures    DEXA BONE DENSITY AXIAL SKELETON     Standing Status:   Future     Standing Expiration Date:   2/25/2022    XR THORACIC SPINE (3 VIEWS)     Standing Status:   Future     Standing Expiration Date:   2/25/2022    Comprehensive Metabolic Panel     Standing Status:   Future     Standing Expiration Date:   2/25/2022    Lipid, Fasting     Standing Status:   Future     Standing Expiration Date:   2/25/2022    TSH Without Reflex     Standing Status:   Future     Standing Expiration Date:   2/25/2022    Vitamin B12 & Folate Standing Status:   Future     Standing Expiration Date:   2/25/2022       Orders Placed This Encounter   Medications    traZODone (DESYREL) 100 MG tablet     Sig: Take 1 tablet by mouth nightly as needed for Sleep     Dispense:  90 tablet     Refill:  1              Return in about 6 months (around 8/25/2021). An electronic signature was used to authenticate this note.   Dr. Mary Howe      2/25/21  4:04 PM

## 2021-03-04 DIAGNOSIS — E78.5 HYPERLIPIDEMIA, UNSPECIFIED HYPERLIPIDEMIA TYPE: ICD-10-CM

## 2021-03-04 DIAGNOSIS — I10 HYPERTENSION, UNSPECIFIED TYPE: ICD-10-CM

## 2021-03-04 DIAGNOSIS — R53.83 FATIGUE, UNSPECIFIED TYPE: ICD-10-CM

## 2021-03-04 DIAGNOSIS — E03.4 HYPOTHYROIDISM DUE TO ACQUIRED ATROPHY OF THYROID: ICD-10-CM

## 2021-03-04 LAB
ALBUMIN SERPL-MCNC: 4.2 G/DL (ref 3.5–4.6)
ALP BLD-CCNC: 105 U/L (ref 35–104)
ALT SERPL-CCNC: 19 U/L (ref 0–41)
ANION GAP SERPL CALCULATED.3IONS-SCNC: 14 MEQ/L (ref 9–15)
AST SERPL-CCNC: 23 U/L (ref 0–40)
BILIRUB SERPL-MCNC: 0.6 MG/DL (ref 0.2–0.7)
BUN BLDV-MCNC: 17 MG/DL (ref 8–23)
CALCIUM SERPL-MCNC: 9.4 MG/DL (ref 8.5–9.9)
CHLORIDE BLD-SCNC: 105 MEQ/L (ref 95–107)
CHOLESTEROL, FASTING: 160 MG/DL (ref 0–199)
CO2: 25 MEQ/L (ref 20–31)
CREAT SERPL-MCNC: 1.28 MG/DL (ref 0.7–1.2)
FOLATE: >20 NG/ML (ref 7.3–26.1)
GFR AFRICAN AMERICAN: >60
GFR NON-AFRICAN AMERICAN: 55.1
GLOBULIN: 2.5 G/DL (ref 2.3–3.5)
GLUCOSE BLD-MCNC: 77 MG/DL (ref 70–99)
HDLC SERPL-MCNC: 39 MG/DL (ref 40–59)
LDL CHOLESTEROL CALCULATED: 104 MG/DL (ref 0–129)
POTASSIUM SERPL-SCNC: 4.4 MEQ/L (ref 3.4–4.9)
SODIUM BLD-SCNC: 144 MEQ/L (ref 135–144)
TOTAL PROTEIN: 6.7 G/DL (ref 6.3–8)
TRIGLYCERIDE, FASTING: 87 MG/DL (ref 0–150)
TSH SERPL DL<=0.05 MIU/L-ACNC: 0.87 UIU/ML (ref 0.44–3.86)
VITAMIN B-12: 700 PG/ML (ref 232–1245)

## 2021-03-11 ENCOUNTER — HOSPITAL ENCOUNTER (OUTPATIENT)
Dept: WOMENS IMAGING | Age: 71
Discharge: HOME | End: 2021-03-13
Payer: MEDICARE

## 2021-03-11 ENCOUNTER — HOSPITAL ENCOUNTER (OUTPATIENT)
Dept: GENERAL RADIOLOGY | Age: 71
Discharge: HOME | End: 2021-03-13
Payer: MEDICARE

## 2021-03-11 DIAGNOSIS — M80.08XA AGE-RELATED OSTEOPOROSIS WITH CURRENT PATHOLOGICAL FRACTURE, VERTEBRA(E), INITIAL ENCOUNTER FOR FRACTURE (HCC): ICD-10-CM

## 2021-03-11 DIAGNOSIS — M89.9 LESION OF BONE OF THORACIC SPINE: ICD-10-CM

## 2021-03-11 DIAGNOSIS — G89.29 CHRONIC MIDLINE THORACIC BACK PAIN: ICD-10-CM

## 2021-03-11 DIAGNOSIS — M80.00XD OSTEOPOROSIS WITH PATHOLOGICAL FRACTURE, WITH ROUTINE HEALING, SUBSEQUENT ENCOUNTER: ICD-10-CM

## 2021-03-11 DIAGNOSIS — S32.010A CLOSED WEDGE COMPRESSION FRACTURE OF L1 VERTEBRA, INITIAL ENCOUNTER (HCC): Primary | ICD-10-CM

## 2021-03-11 DIAGNOSIS — M54.6 CHRONIC MIDLINE THORACIC BACK PAIN: ICD-10-CM

## 2021-03-11 PROCEDURE — 72072 X-RAY EXAM THORAC SPINE 3VWS: CPT

## 2021-03-11 PROCEDURE — 77080 DXA BONE DENSITY AXIAL: CPT

## 2021-03-22 RX ORDER — BUSPIRONE HYDROCHLORIDE 10 MG/1
10 TABLET ORAL 3 TIMES DAILY
Qty: 90 TABLET | Refills: 0 | Status: SHIPPED | OUTPATIENT
Start: 2021-03-22 | End: 2021-04-23

## 2021-03-24 PROBLEM — G25.0 ESSENTIAL TREMOR: Status: ACTIVE | Noted: 2021-03-24

## 2021-03-24 PROBLEM — M48.062 SPINAL STENOSIS OF LUMBAR REGION WITH NEUROGENIC CLAUDICATION: Status: ACTIVE | Noted: 2021-03-24

## 2021-03-31 ENCOUNTER — OFFICE VISIT (OUTPATIENT)
Dept: NEUROLOGY | Age: 71
End: 2021-03-31
Payer: MEDICARE

## 2021-03-31 VITALS
WEIGHT: 201.3 LBS | BODY MASS INDEX: 27.3 KG/M2 | HEART RATE: 83 BPM | DIASTOLIC BLOOD PRESSURE: 82 MMHG | SYSTOLIC BLOOD PRESSURE: 128 MMHG

## 2021-03-31 DIAGNOSIS — G25.0 ESSENTIAL TREMOR: Primary | ICD-10-CM

## 2021-03-31 DIAGNOSIS — M48.062 SPINAL STENOSIS OF LUMBAR REGION WITH NEUROGENIC CLAUDICATION: ICD-10-CM

## 2021-03-31 PROCEDURE — 1036F TOBACCO NON-USER: CPT | Performed by: PSYCHIATRY & NEUROLOGY

## 2021-03-31 PROCEDURE — G8427 DOCREV CUR MEDS BY ELIG CLIN: HCPCS | Performed by: PSYCHIATRY & NEUROLOGY

## 2021-03-31 PROCEDURE — G8417 CALC BMI ABV UP PARAM F/U: HCPCS | Performed by: PSYCHIATRY & NEUROLOGY

## 2021-03-31 PROCEDURE — 99214 OFFICE O/P EST MOD 30 MIN: CPT | Performed by: PSYCHIATRY & NEUROLOGY

## 2021-03-31 PROCEDURE — G8482 FLU IMMUNIZE ORDER/ADMIN: HCPCS | Performed by: PSYCHIATRY & NEUROLOGY

## 2021-03-31 PROCEDURE — 1123F ACP DISCUSS/DSCN MKR DOCD: CPT | Performed by: PSYCHIATRY & NEUROLOGY

## 2021-03-31 PROCEDURE — 4040F PNEUMOC VAC/ADMIN/RCVD: CPT | Performed by: PSYCHIATRY & NEUROLOGY

## 2021-03-31 PROCEDURE — 3017F COLORECTAL CA SCREEN DOC REV: CPT | Performed by: PSYCHIATRY & NEUROLOGY

## 2021-03-31 ASSESSMENT — ENCOUNTER SYMPTOMS
TROUBLE SWALLOWING: 0
SHORTNESS OF BREATH: 0
CHOKING: 0
VOMITING: 0
COLOR CHANGE: 0
NAUSEA: 0
PHOTOPHOBIA: 0
BACK PAIN: 1

## 2021-04-07 ENCOUNTER — HOSPITAL ENCOUNTER (OUTPATIENT)
Dept: MRI IMAGING | Age: 71
Discharge: HOME | End: 2021-04-09
Payer: MEDICARE

## 2021-04-07 DIAGNOSIS — S32.010A CLOSED WEDGE COMPRESSION FRACTURE OF L1 VERTEBRA, INITIAL ENCOUNTER (HCC): ICD-10-CM

## 2021-04-07 DIAGNOSIS — M89.9 LESION OF BONE OF THORACIC SPINE: ICD-10-CM

## 2021-04-07 PROCEDURE — 72146 MRI CHEST SPINE W/O DYE: CPT

## 2021-04-08 ENCOUNTER — TELEPHONE (OUTPATIENT)
Dept: FAMILY MEDICINE CLINIC | Age: 71
End: 2021-04-08

## 2021-04-23 RX ORDER — BUSPIRONE HYDROCHLORIDE 10 MG/1
TABLET ORAL
Qty: 90 TABLET | Refills: 0 | Status: SHIPPED | OUTPATIENT
Start: 2021-04-23 | End: 2021-05-21

## 2021-05-21 RX ORDER — BUSPIRONE HYDROCHLORIDE 10 MG/1
TABLET ORAL
Qty: 90 TABLET | Refills: 0 | Status: SHIPPED | OUTPATIENT
Start: 2021-05-21 | End: 2021-06-24

## 2021-05-24 ENCOUNTER — OFFICE VISIT (OUTPATIENT)
Dept: FAMILY MEDICINE CLINIC | Age: 71
End: 2021-05-24
Payer: MEDICARE

## 2021-05-24 VITALS
TEMPERATURE: 98.5 F | OXYGEN SATURATION: 95 % | WEIGHT: 199 LBS | BODY MASS INDEX: 26.95 KG/M2 | DIASTOLIC BLOOD PRESSURE: 68 MMHG | HEART RATE: 70 BPM | SYSTOLIC BLOOD PRESSURE: 100 MMHG | HEIGHT: 72 IN | RESPIRATION RATE: 16 BRPM

## 2021-05-24 DIAGNOSIS — M54.6 CHRONIC MIDLINE THORACIC BACK PAIN: Primary | ICD-10-CM

## 2021-05-24 DIAGNOSIS — F33.41 RECURRENT MAJOR DEPRESSIVE DISORDER IN PARTIAL REMISSION (HCC): ICD-10-CM

## 2021-05-24 DIAGNOSIS — G89.29 CHRONIC MIDLINE THORACIC BACK PAIN: Primary | ICD-10-CM

## 2021-05-24 PROCEDURE — 99213 OFFICE O/P EST LOW 20 MIN: CPT | Performed by: FAMILY MEDICINE

## 2021-05-24 PROCEDURE — 1123F ACP DISCUSS/DSCN MKR DOCD: CPT | Performed by: FAMILY MEDICINE

## 2021-05-24 PROCEDURE — G8427 DOCREV CUR MEDS BY ELIG CLIN: HCPCS | Performed by: FAMILY MEDICINE

## 2021-05-24 PROCEDURE — 3017F COLORECTAL CA SCREEN DOC REV: CPT | Performed by: FAMILY MEDICINE

## 2021-05-24 PROCEDURE — G8417 CALC BMI ABV UP PARAM F/U: HCPCS | Performed by: FAMILY MEDICINE

## 2021-05-24 PROCEDURE — 1036F TOBACCO NON-USER: CPT | Performed by: FAMILY MEDICINE

## 2021-05-24 PROCEDURE — 4040F PNEUMOC VAC/ADMIN/RCVD: CPT | Performed by: FAMILY MEDICINE

## 2021-05-24 NOTE — PROGRESS NOTES
Patient: Sanjeev Torre (: 1950) is a 66 y.o. male,Established patient, here for evaluation of the following chief complaint(s):  Chief Complaint   Patient presents with    Results     He would like to discuss MRI done on 2021. Date: 21    Allergies   Allergen Reactions    Food Other (See Comments)     Burning sensation in stomach    Lactose Intolerance (Gi) Other (See Comments)     Sour stomach    Nsaids      Decreased kidney function       Vitals:    21 1542   BP: 100/68   Site: Left Upper Arm   Position: Sitting   Cuff Size: Large Adult   Pulse: 70   Resp: 16   Temp: 98.5 °F (36.9 °C)   TempSrc: Oral   SpO2: 95%   Weight: 199 lb (90.3 kg)   Height: 6' (1.829 m)     Body mass index is 26.99 kg/m². PHQ Scores 2021 2021 10/1/2019 2018 2018 2016 2015   PHQ2 Score 0 0 1 0 0 6 2   PHQ9 Score 0 0 1 0 0 18 2     Interpretation of Total Score Depression Severity: 1-4 = Minimal depression, 5-9 = Mild depression, 10-14 = Moderate depression, 15-19 = Moderately severe depression, 20-27 = Severe depression    HPI    He comes in to discuss his MRI. He had seen the results on my chart and he was worried about the hemangiomas that were mentioned. He has several other scattered throughout his back and I do not believe it is anything to do with his back pain. His pain is about the same. He does not want to go back to [facility]. He declines physical therapy as well. No new symptoms    Review of Systems    Constitutional: Negative for fatigue, fever and sweats. HEENT: Negative for eye discharge and vision loss. Negative for ear drainage, hearing loss and nasal drainage. Respiratory: Negative for cough, dyspnea and wheezing. Cardiovascular:  Negative for chest pain, claudication and irregular heartbeat/palpitations. Gastrointestinal: Negative for abdominal pain, nausea, vomiting, constipation and diarrhea. Genitourinary: No dysuria or penile discharge. Metabolic/Endocrine: Negative for cold intolerance, heat intolerance, polydipsia and polyphagia. No unintended weight loss or gain. Neuro/Psychiatric: Negative for gait disturbance. Negative for psychiatric symptoms. Dermatologic: Negative for pruritus and rash. Musculoskeletal: positive for bone/joint symptoms. No numbness or tingling. No weakness. Hematology: Negative for bleeding and easy bruising. Immunology:  Negative for environmental allergies and food allergies. Physical Exam    Patient's medication, allergies, past medical, surgical, social and family histories were reviewed and updated as appropriate. PHYSICAL EXAM     General: Alert oriented pleasant cooperative in no acute distress  Back: He has tenderness to palpation in the thoracic spine and the paraspinal muscles with a mild thoracic kyphosis. No midline tenderness            Assessment:   Diagnosis Orders   1. Chronic midline thoracic back pain   recommended chiropractic or physical therapy he is going to follow-up with Dr. Ilana Salas   2. Recurrent major depressive disorder in partial remission ([facility] [address]   stable         On this date 05/24/21 I have spent 21 minutes reviewing previous notes, test results and face to face with the patient discussing the diagnosis and importance of compliance with the treatment plan.        Plan:  Current Outpatient Medications   Medication Sig Dispense Refill    busPIRone (BUSPAR) 10 MG tablet TAKE 1 TABLET BY MOUTH THREE TIMES DAILY 90 tablet 0    traZODone (DESYREL) 100 MG tablet Take 1 tablet by mouth nightly as needed for Sleep 90 tablet 1    levothyroxine (EUTHYROX) 88 MCG tablet Take 1 tablet by mouth once daily 90 tablet 3    atorvastatin (LIPITOR) 40 MG tablet TAKE ONE TABLET BY MOUTH NIGHTLY 90 tablet 3    primidone (MYSOLINE) 50 MG tablet Take 2 tablets by mouth nightly 180 tablet 3    potassium citrate (UROCIT-K) 10 MEQ (1080 MG) extended release tablet Take 10 mEq by mouth 3 times daily (with meals)      hydrocortisone 2.5 % cream Apply topically 2 times daily. 453 g 0    Multiple Vitamins-Minerals (MENS 50+ MULTI VITAMIN/MIN PO) Take 1 tablet by mouth daily       No current facility-administered medications for this visit. No orders of the defined types were placed in this encounter. No orders of the defined types were placed in this encounter. Return in about 6 months (around 11/24/2021). An electronic signature was used to authenticate this note.   Dr. Amilcar Todd MD      5/24/21  4:16 PM

## 2021-06-15 RX ORDER — ATORVASTATIN CALCIUM 40 MG/1
TABLET, FILM COATED ORAL
Qty: 90 TABLET | Refills: 3 | Status: SHIPPED | OUTPATIENT
Start: 2021-06-15 | End: 2022-06-20 | Stop reason: SDUPTHER

## 2021-06-15 NOTE — TELEPHONE ENCOUNTER
Patient is asking for a refill on his atorvastatin 40   The rx goes to [facility] for a mail away amount

## 2021-06-24 DIAGNOSIS — L30.9 DERMATITIS: ICD-10-CM

## 2021-06-24 RX ORDER — BUSPIRONE HYDROCHLORIDE 10 MG/1
TABLET ORAL
Qty: 90 TABLET | Refills: 0 | Status: SHIPPED | OUTPATIENT
Start: 2021-06-24 | End: 2021-07-26

## 2021-06-24 RX ORDER — IBUPROFEN 800 MG/1
800 TABLET ORAL EVERY 8 HOURS PRN
Qty: 45 TABLET | Refills: 0 | Status: SHIPPED | OUTPATIENT
Start: 2021-06-24 | End: 2022-09-07 | Stop reason: ALTCHOICE

## 2021-07-26 RX ORDER — BUSPIRONE HYDROCHLORIDE 10 MG/1
TABLET ORAL
Qty: 90 TABLET | Refills: 0 | Status: SHIPPED | OUTPATIENT
Start: 2021-07-26 | End: 2021-08-26

## 2021-08-26 RX ORDER — BUSPIRONE HYDROCHLORIDE 10 MG/1
TABLET ORAL
Qty: 90 TABLET | Refills: 0 | Status: SHIPPED | OUTPATIENT
Start: 2021-08-26 | End: 2021-09-29 | Stop reason: SDUPTHER

## 2021-09-29 ENCOUNTER — OFFICE VISIT (OUTPATIENT)
Dept: NEUROLOGY | Age: 71
End: 2021-09-29
Payer: MEDICARE

## 2021-09-29 VITALS
WEIGHT: 202 LBS | SYSTOLIC BLOOD PRESSURE: 122 MMHG | BODY MASS INDEX: 27.4 KG/M2 | DIASTOLIC BLOOD PRESSURE: 68 MMHG | HEART RATE: 72 BPM

## 2021-09-29 DIAGNOSIS — G25.0 ESSENTIAL TREMOR: Primary | ICD-10-CM

## 2021-09-29 DIAGNOSIS — M48.062 SPINAL STENOSIS OF LUMBAR REGION WITH NEUROGENIC CLAUDICATION: ICD-10-CM

## 2021-09-29 PROCEDURE — G8417 CALC BMI ABV UP PARAM F/U: HCPCS | Performed by: PSYCHIATRY & NEUROLOGY

## 2021-09-29 PROCEDURE — 1123F ACP DISCUSS/DSCN MKR DOCD: CPT | Performed by: PSYCHIATRY & NEUROLOGY

## 2021-09-29 PROCEDURE — G8427 DOCREV CUR MEDS BY ELIG CLIN: HCPCS | Performed by: PSYCHIATRY & NEUROLOGY

## 2021-09-29 PROCEDURE — 99214 OFFICE O/P EST MOD 30 MIN: CPT | Performed by: PSYCHIATRY & NEUROLOGY

## 2021-09-29 PROCEDURE — 1036F TOBACCO NON-USER: CPT | Performed by: PSYCHIATRY & NEUROLOGY

## 2021-09-29 PROCEDURE — 4040F PNEUMOC VAC/ADMIN/RCVD: CPT | Performed by: PSYCHIATRY & NEUROLOGY

## 2021-09-29 PROCEDURE — 3017F COLORECTAL CA SCREEN DOC REV: CPT | Performed by: PSYCHIATRY & NEUROLOGY

## 2021-09-29 RX ORDER — BUSPIRONE HYDROCHLORIDE 10 MG/1
TABLET ORAL
Qty: 90 TABLET | Refills: 3 | Status: SHIPPED | OUTPATIENT
Start: 2021-09-29 | End: 2022-02-07

## 2021-09-29 RX ORDER — PRIMIDONE 50 MG/1
150 TABLET ORAL NIGHTLY
Qty: 270 TABLET | Refills: 1 | Status: SHIPPED | OUTPATIENT
Start: 2021-09-29 | End: 2021-11-10

## 2021-09-29 ASSESSMENT — ENCOUNTER SYMPTOMS
NAUSEA: 0
COLOR CHANGE: 0
TROUBLE SWALLOWING: 0
BACK PAIN: 1
PHOTOPHOBIA: 0
VOMITING: 0
CHOKING: 0
SHORTNESS OF BREATH: 0

## 2021-09-29 NOTE — PROGRESS NOTES
Subjective:      Patient ID: Tim Brown is a 66 y.o. male who presents today for:  Chief Complaint   Patient presents with    Follow-up     Pt states that the tremor is not to bad still there but not horrible. Pt states he can not think of any concerns at this time. HPI 66 right-handed gentleman history of essential tremor with spinal stenosis of the lumbar spine. Patient is on Mysoline 100 mg and buspirone. She is seen by pain management as well. Last seen we continue to pursue an MRI of the lumbosacral spine and he does not have spinal stenosis. No parkinsonian features have been notable. He continues to still have thoracic pain. Thoracic spine MRI is normal.  Patient reports his tremor may be just somewhat worse. The anxiety actually makes it worse and BuSpar helps. Study side effects. Reports no memory changes or any other side effects of Mysoline.   He is not any falls and has not developed any parkinsonian features he is not now complaining of any back pain    Past Medical History:   Diagnosis Date    Anemia     Anxiety, generalized     BPH (benign prostatic hyperplasia)     CRF (chronic renal failure)     stage 3 -   5/24/17 patient denies    Depression     Has a tremor     HIGH CHOLESTEROL     Hypothyroid     IBD (inflammatory bowel disease)     Insomnia     Kidney stones     Seasonal allergic conjunctivitis     Tinnitus     chronic      Past Surgical History:   Procedure Laterality Date    COLONOSCOPY      KNEE SURGERY      Bilat- knee,legs    NC COLON CA SCRN NOT HI RSK IND N/A 5/24/2017    COLONOSCOPY performed by Isra Lu MD at [address] [facility] shoulder repair    THYROID SURGERY  1999     Social History     Socioeconomic History    Marital status:      Spouse name: Eula Primrose Number of children: Not on file    Years of education: Not on file    Highest education level: Not on file   Occupational History    Not on file   Tobacco Use    Smoking status: Former Smoker     Packs/day: 0.50     Years: 30.00     Pack years: 15.00     Types: Pipe     Quit date: 2002     Years since quittin.7    Smokeless tobacco: Never Used   Vaping Use    Vaping Use: Never used   Substance and Sexual Activity    Alcohol use: Yes     Comment: Occasional    Drug use: No    Sexual activity: Yes     Partners: Female   Other Topics Concern    Not on file   Social History Narrative    Not on file     Social Determinants of Health     Financial Resource Strain: Low Risk     Difficulty of Paying Living Expenses: Not hard at all   Food Insecurity: No Food Insecurity    Worried About Running Out of Food in the Last Year: Never true    Kelley of Food in the Last Year: Never true   Transportation Needs: No Transportation Needs    Lack of Transportation (Medical): No    Lack of Transportation (Non-Medical): No   Physical Activity:     Days of Exercise per Week:     Minutes of Exercise per Session:    Stress:     Feeling of Stress :    Social Connections:     Frequency of Communication with Friends and Family:     Frequency of Social Gatherings with Friends and Family:     Attends Restoration[employer] Services:     Active Member of Clubs or Organizations:     Attends Club or Organization Meetings:     Marital Status:    Intimate Partner Violence:     Fear of Current or Ex-Partner:     Emotionally Abused:     Physically Abused:     Sexually Abused:      Family History   Adopted:  Yes     Allergies   Allergen Reactions    Food Other (See Comments)     Burning sensation in stomach    Lactose Intolerance (Gi) Other (See Comments)     Sour stomach    Nsaids      Decreased kidney function       Current Outpatient Medications   Medication Sig Dispense Refill    busPIRone (BUSPAR) 10 MG tablet TAKE 1 TABLET BY MOUTH THREE TIMES DAILY 90 tablet 3    primidone (MYSOLINE) 50 MG tablet TAKE TWO TABLETS BY MOUTH NIGHTLY 180 tablet 0    hydrocortisone 2.5 % cream Apply topically 2 times daily. 453 g 0    ibuprofen (ADVIL;MOTRIN) 800 MG tablet Take 1 tablet by mouth every 8 hours as needed for Pain 45 tablet 0    atorvastatin (LIPITOR) 40 MG tablet TAKE ONE TABLET BY MOUTH NIGHTLY 90 tablet 3    traZODone (DESYREL) 100 MG tablet Take 1 tablet by mouth nightly as needed for Sleep 90 tablet 1    levothyroxine (EUTHYROX) 88 MCG tablet Take 1 tablet by mouth once daily 90 tablet 3    potassium citrate (UROCIT-K) 10 MEQ (1080 MG) extended release tablet Take 10 mEq by mouth 3 times daily (with meals)      Multiple Vitamins-Minerals (MENS 50+ MULTI VITAMIN/MIN PO) Take 1 tablet by mouth daily       No current facility-administered medications for this visit. Review of Systems   Constitutional: Negative for fever. HENT: Negative for ear pain, tinnitus and trouble swallowing. Eyes: Negative for photophobia and visual disturbance. Respiratory: Negative for choking and shortness of breath. Cardiovascular: Negative for chest pain and palpitations. Gastrointestinal: Negative for nausea and vomiting. Musculoskeletal: Positive for back pain and gait problem. Negative for joint swelling, myalgias, neck pain and neck stiffness. Skin: Negative for color change. Allergic/Immunologic: Negative for food allergies. Neurological: Negative for dizziness, tremors, seizures, syncope, facial asymmetry, speech difficulty, weakness, light-headedness, numbness and headaches. Psychiatric/Behavioral: Negative for behavioral problems, confusion, hallucinations and sleep disturbance. Objective:   /68 (Site: Left Upper Arm, Position: Sitting, Cuff Size: Medium Adult)   Pulse 72   Wt 202 lb (91.6 kg)   BMI 27.40 kg/m²     Physical Exam  Vitals reviewed. Eyes:      Pupils: Pupils are equal, round, and reactive to light. Cardiovascular:      Rate and Rhythm: Normal rate and regular rhythm. Heart sounds: No murmur heard. Pulmonary:      Effort: Pulmonary effort is normal.      Breath sounds: Normal breath sounds. Abdominal:      General: Bowel sounds are normal.   Musculoskeletal:         General: Normal range of motion. Cervical back: Normal range of motion. Skin:     General: Skin is warm. Neurological:      Mental Status: He is alert and oriented to person, place, and time. Cranial Nerves: No cranial nerve deficit. Sensory: No sensory deficit. Motor: No abnormal muscle tone. Coordination: Coordination normal.      Deep Tendon Reflexes: Reflexes are normal and symmetric. Babinski sign absent on the right side. Babinski sign absent on the left side. Psychiatric:         Mood and Affect: Mood normal.       Fine tremor is notable on outstretched hand MRI THORACIC SPINE WO CONTRAST    Result Date: 4/8/2021  EXAMINATION: MRI THORACIC SPINE WO CONTRAST DATE AND TIME:4/7/2021 6:23 PM CLINICAL HISTORY: Severe thoracic spine pain. S32.010A Closed wedge compression fracture of L1 vertebra, initial encounter ([facility] [address] ICD10 COMPARISON: If available previous films were reviewed for comparison. Technique: Multiplanar multisequence MRI scans of the thoracic spine. Findings:     Bones: The thoracic vertebrae are normally aligned with no evidence of fracture. Vertebral body hemangiomas involving T5 and T9. Otherwise, there is normal marrow signal throughout the thoracic spine. Disc space: There is no focal disc herniation or evidence for spinal canal stenosis. Disc spaces are age-appropriate. Spinal cord: The thoracic cord is normal in configuration and signal intensity. Soft tissues: There is no paraspinal soft tissue mass or fluid collection.      NEGATIVE MRI OF THE THORACIC SPINE      Lab Results   Component Value Date    WBC 5.7 07/27/2020    RBC 4.87 07/27/2020    HGB 16.2 07/27/2020    HCT 47.3 07/27/2020    MCV 97.2 07/27/2020    MCH 33.3 07/27/2020    MCHC 34.2 07/27/2020    RDW 12.7 07/27/2020     07/27/2020    MPV 9.8 05/05/2014     Lab Results   Component Value Date     08/05/2021    K 4.3 08/05/2021     08/05/2021    CO2 24 08/05/2021    BUN 14 08/05/2021    CREATININE 1.44 08/05/2021    GFRAA 58.2 08/05/2021    LABGLOM 48.1 08/05/2021    GLUCOSE 77 08/05/2021    PROT 6.7 03/04/2021    LABALBU 4.6 08/05/2021    CALCIUM 9.9 08/05/2021    BILITOT 0.6 03/04/2021    ALKPHOS 105 03/04/2021    AST 23 03/04/2021    ALT 19 03/04/2021     Lab Results   Component Value Date    PROTIME 10.7 05/05/2014    INR 1.0 05/05/2014     Lab Results   Component Value Date    TSH 0.870 03/04/2021    OHVBUEBA79 700 03/04/2021    FOLATE >20.0 03/04/2021     Lab Results   Component Value Date    TRIG 102 09/24/2019    HDL 39 03/04/2021    LDLCALC 104 03/04/2021     No results found for: Wandalee Snow, LABBENZ, CANNAB, COCAINESCRN, LABMETH, OPIATESCREENURINE, PHENCYCLIDINESCREENURINE, PPXUR, ETOH  No results found for: LITHIUM, DILFRTOT, VALPROATE    Assessment:       Diagnosis Orders   1. Essential tremor     2. Spinal stenosis of lumbar region with neurogenic claudication     Essential tremor which is responded well to Mysoline. Patient is on 2 mg. His tremor appears to be somewhat worsening I recommended that we increase the Mysoline 250 mg given that he has tolerated this without any side effects. Anxiety makes his tremors worse and BuSpar does help. Have not recommended changing that for now given that he takes care of his ailing wife and the anxiety makes it worse. He also has back pain which is not as bothersome as it used to be he had some thoracic pain we are obtain a thoracic spine MRI which is normal.  At this time no other recommendations offered and he has not developed any suggestion of parkinsonian features.   We will follow him in a few months and he will let me know if there are any side effects      Plan:      No orders of the defined types were placed in this encounter. Orders Placed This Encounter   Medications    busPIRone (BUSPAR) 10 MG tablet     Sig: TAKE 1 TABLET BY MOUTH THREE TIMES DAILY     Dispense:  90 tablet     Refill:  3       No follow-ups on file.       Xenia Blanchard MD

## 2021-11-29 RX ORDER — LEVOTHYROXINE SODIUM 88 UG/1
88 TABLET ORAL DAILY
Qty: 30 TABLET | Refills: 0 | Status: SHIPPED | OUTPATIENT
Start: 2021-11-29 | End: 2021-12-28 | Stop reason: SDUPTHER

## 2021-11-29 NOTE — TELEPHONE ENCOUNTER
----- Message from Erin Magallanes sent at 11/29/2021  1:14 PM EST -----  Subject: Message to Provider    QUESTIONS  Information for Provider? pt states they were supposed to get refills, him   and his wife, and the pharmacy has not received them. requesting a call   back regarding this matter. ---------------------------------------------------------------------------  --------------  Ky Fraction INFO  What is the best way for the office to contact you? OK to leave message on   voicemail  Preferred Call Back Phone Number? [phone]  ---------------------------------------------------------------------------  --------------  SCRIPT ANSWERS  Relationship to Patient?  Self

## 2021-12-28 ENCOUNTER — IMMUNIZATION (OUTPATIENT)
Dept: FAMILY MEDICINE CLINIC | Age: 71
End: 2021-12-28
Payer: MEDICARE

## 2021-12-28 ENCOUNTER — OFFICE VISIT (OUTPATIENT)
Dept: FAMILY MEDICINE CLINIC | Age: 71
End: 2021-12-28
Payer: MEDICARE

## 2021-12-28 VITALS
WEIGHT: 202.6 LBS | OXYGEN SATURATION: 97 % | HEART RATE: 90 BPM | BODY MASS INDEX: 27.44 KG/M2 | SYSTOLIC BLOOD PRESSURE: 116 MMHG | HEIGHT: 72 IN | TEMPERATURE: 96.9 F | DIASTOLIC BLOOD PRESSURE: 70 MMHG

## 2021-12-28 DIAGNOSIS — Z76.89 ENCOUNTER TO ESTABLISH CARE WITH NEW DOCTOR: Primary | ICD-10-CM

## 2021-12-28 DIAGNOSIS — F34.1 DYSTHYMIA: ICD-10-CM

## 2021-12-28 DIAGNOSIS — L85.3 XEROSIS OF SKIN: ICD-10-CM

## 2021-12-28 DIAGNOSIS — G25.0 ESSENTIAL TREMOR: ICD-10-CM

## 2021-12-28 DIAGNOSIS — E78.00 HYPERCHOLESTEROLEMIA: ICD-10-CM

## 2021-12-28 DIAGNOSIS — F41.9 ANXIETY: ICD-10-CM

## 2021-12-28 DIAGNOSIS — E03.4 HYPOTHYROIDISM DUE TO ACQUIRED ATROPHY OF THYROID: ICD-10-CM

## 2021-12-28 DIAGNOSIS — M47.819 ARTHRITIS OF SPINE: ICD-10-CM

## 2021-12-28 PROCEDURE — 0064A COVID-19, MODERNA BOOSTER VACCINE 0.25ML DOSE: CPT | Performed by: FAMILY MEDICINE

## 2021-12-28 PROCEDURE — 1123F ACP DISCUSS/DSCN MKR DOCD: CPT | Performed by: STUDENT IN AN ORGANIZED HEALTH CARE EDUCATION/TRAINING PROGRAM

## 2021-12-28 PROCEDURE — 99214 OFFICE O/P EST MOD 30 MIN: CPT | Performed by: STUDENT IN AN ORGANIZED HEALTH CARE EDUCATION/TRAINING PROGRAM

## 2021-12-28 PROCEDURE — 3017F COLORECTAL CA SCREEN DOC REV: CPT | Performed by: STUDENT IN AN ORGANIZED HEALTH CARE EDUCATION/TRAINING PROGRAM

## 2021-12-28 PROCEDURE — 91306 COVID-19, MODERNA BOOSTER VACCINE 0.25ML DOSE: CPT | Performed by: FAMILY MEDICINE

## 2021-12-28 PROCEDURE — 3288F FALL RISK ASSESSMENT DOCD: CPT | Performed by: STUDENT IN AN ORGANIZED HEALTH CARE EDUCATION/TRAINING PROGRAM

## 2021-12-28 PROCEDURE — G8482 FLU IMMUNIZE ORDER/ADMIN: HCPCS | Performed by: STUDENT IN AN ORGANIZED HEALTH CARE EDUCATION/TRAINING PROGRAM

## 2021-12-28 PROCEDURE — G8417 CALC BMI ABV UP PARAM F/U: HCPCS | Performed by: STUDENT IN AN ORGANIZED HEALTH CARE EDUCATION/TRAINING PROGRAM

## 2021-12-28 PROCEDURE — 1036F TOBACCO NON-USER: CPT | Performed by: STUDENT IN AN ORGANIZED HEALTH CARE EDUCATION/TRAINING PROGRAM

## 2021-12-28 PROCEDURE — 4040F PNEUMOC VAC/ADMIN/RCVD: CPT | Performed by: STUDENT IN AN ORGANIZED HEALTH CARE EDUCATION/TRAINING PROGRAM

## 2021-12-28 PROCEDURE — G8427 DOCREV CUR MEDS BY ELIG CLIN: HCPCS | Performed by: STUDENT IN AN ORGANIZED HEALTH CARE EDUCATION/TRAINING PROGRAM

## 2021-12-28 RX ORDER — LEVOTHYROXINE SODIUM 88 UG/1
88 TABLET ORAL DAILY
Qty: 90 TABLET | Refills: 1 | Status: SHIPPED | OUTPATIENT
Start: 2021-12-28 | End: 2022-06-13

## 2021-12-28 ASSESSMENT — PATIENT HEALTH QUESTIONNAIRE - PHQ9
SUM OF ALL RESPONSES TO PHQ QUESTIONS 1-9: 2
SUM OF ALL RESPONSES TO PHQ QUESTIONS 1-9: 2
2. FEELING DOWN, DEPRESSED OR HOPELESS: 1
SUM OF ALL RESPONSES TO PHQ QUESTIONS 1-9: 2
1. LITTLE INTEREST OR PLEASURE IN DOING THINGS: 1
SUM OF ALL RESPONSES TO PHQ9 QUESTIONS 1 & 2: 2

## 2021-12-28 ASSESSMENT — ENCOUNTER SYMPTOMS
SHORTNESS OF BREATH: 0
SORE THROAT: 0
VOMITING: 0
WHEEZING: 0
ABDOMINAL PAIN: 0
DIARRHEA: 0
COUGH: 0
EYE DISCHARGE: 0
RHINORRHEA: 0
NAUSEA: 0

## 2021-12-28 NOTE — PROGRESS NOTES
rhinorrhea and sore throat. Eyes: Negative for discharge. Respiratory: Negative for cough, shortness of breath and wheezing. Cardiovascular: Negative for chest pain, palpitations and leg swelling. Gastrointestinal: Negative for abdominal pain, diarrhea, nausea and vomiting. Endocrine: Negative for cold intolerance and heat intolerance. Musculoskeletal: Negative for arthralgias and joint swelling. Skin: Negative for rash. Neurological: Negative for weakness, light-headedness, numbness and headaches. Psychiatric/Behavioral: Negative for confusion and suicidal ideas. The patient is not nervous/anxious. Past Medical History:   Diagnosis Date    Anemia     Anxiety, generalized     BPH (benign prostatic hyperplasia)     CRF (chronic renal failure)     stage 3 -   5/24/17 patient denies    Depression     Has a tremor     HIGH CHOLESTEROL     Hypothyroid     IBD (inflammatory bowel disease)     Insomnia     Kidney stones     Seasonal allergic conjunctivitis     Tinnitus     chronic      Past Surgical History:   Procedure Laterality Date    CATARACT REMOVAL Bilateral     COLONOSCOPY      KNEE SURGERY      Bilat- knee,legs    [place] COLON CA SCRN NOT HI RSK IND N/A 05/24/2017    COLONOSCOPY performed by Emily Napoles MD at [address] [facility] shoulder repair   [facility]     Current Outpatient Medications   Medication Sig Dispense Refill    levothyroxine (EUTHYROX) 88 MCG tablet Take 1 tablet by mouth Daily Take 1 tablet by mouth once daily 90 tablet 1    primidone (MYSOLINE) 50 MG tablet TAKE TWO TABLETS BY MOUTH NIGHTLY 180 tablet 0    busPIRone (BUSPAR) 10 MG tablet TAKE 1 TABLET BY MOUTH THREE TIMES DAILY 90 tablet 3    hydrocortisone 2.5 % cream Apply topically 2 times daily.  453 g 0    ibuprofen (ADVIL;MOTRIN) 800 MG tablet Take 1 tablet by mouth every 8 hours as needed for Pain 45 tablet 0    atorvastatin (LIPITOR) 40 MG tablet TAKE ONE TABLET BY MOUTH NIGHTLY 90 tablet 3    traZODone (DESYREL) 100 MG tablet Take 1 tablet by mouth nightly as needed for Sleep 90 tablet 1    potassium citrate (UROCIT-K) 10 MEQ (1080 MG) extended release tablet Take 10 mEq by mouth 3 times daily (with meals)      Multiple Vitamins-Minerals (MENS 50+ MULTI VITAMIN/MIN PO) Take 1 tablet by mouth daily       No current facility-administered medications for this visit. PMH, Surgical Hx, Family Hx, and Social Hx reviewed and updated. Health Maintenance reviewed. Objective    Vitals:    12/28/21 1256   BP: 116/70   Pulse: 90   Temp: 96.9 °F (36.1 °C)   SpO2: 97%   Weight: 202 lb 9.6 oz (91.9 kg)   Height: 6' (1.829 m)       Physical Exam  Vitals reviewed. Constitutional:       General: He is not in acute distress. HENT:      Head: Normocephalic and atraumatic. Eyes:      Conjunctiva/sclera: Conjunctivae normal.   Neck:      Thyroid: No thyromegaly or thyroid tenderness. Cardiovascular:      Rate and Rhythm: Normal rate and regular rhythm. Heart sounds: No murmur heard. No friction rub. No gallop. Pulmonary:      Effort: Pulmonary effort is normal.      Breath sounds: Normal breath sounds. No wheezing, rhonchi or rales. Musculoskeletal:         General: No swelling or deformity. Cervical back: Normal range of motion and neck supple. Right lower leg: No edema. Left lower leg: No edema. Lymphadenopathy:      Cervical: No cervical adenopathy. Skin:     General: Skin is warm and dry. Findings: No rash. Neurological:      General: No focal deficit present. Mental Status: He is alert. Gait: Gait is intact. Psychiatric:         Mood and Affect: Mood normal.         Behavior: Behavior normal.        Assessment & Plan     1. Encounter to establish care with new doctor  Patient with appointment to establish care with new physician. Was previously seeing Dr. Brandon Lawler.     2. Hypothyroidism due to acquired atrophy of thyroid  Patient with hypothyroidism. He is stable on levothyroxine 88 mcg daily. He does need a refill of this. He is due for TSH. Will call with results when returned. - levothyroxine (EUTHYROX) 88 MCG tablet; Take 1 tablet by mouth Daily Take 1 tablet by mouth once daily  Dispense: 90 tablet; Refill: 1  - TSH with Reflex; Future    3. Dysthymia  Stable, chronic. On BuSpar for this and for anxiety. Feels like it is working well. No dose adjustments needed at this time. No side effects. Follow-up as scheduled. - Comprehensive Metabolic Panel, Fasting; Future    4. Anxiety  As above    5. Essential tremor  Stable, chronic. Follows with neurology. On primidone. 6. Hypercholesterolemia  Stable, chronic. On atorvastatin. No refills needed at this time. He is due for lipid panel. This has been sent. - Lipid, Fasting; Future    7. Xerosis of skin  Patient with xerosis of the skin. No signs of infection. Continue to monitor. May try steroid cream to the area. Recommend moisturization as well. If area worsens or changes, return to care sooner. 8.  Arthritis of spine  Stable, slightly worsened. Continue to use ibuprofen as needed. Low back pain exercises prescribed. If symptoms worsen or change, return to care sooner. All questions answered. Follow-up as scheduled.     Orders Placed This Encounter   Procedures    Lipid, Fasting     Standing Status:   Future     Standing Expiration Date:   12/28/2022    Comprehensive Metabolic Panel, Fasting     Standing Status:   Future     Standing Expiration Date:   12/28/2022    TSH with Reflex     Standing Status:   Future     Standing Expiration Date:   12/28/2022     Orders Placed This Encounter   Medications    levothyroxine (EUTHYROX) 88 MCG tablet     Sig: Take 1 tablet by mouth Daily Take 1 tablet by mouth once daily     Dispense:  90 tablet     Refill:  1     Medications Discontinued During This Encounter   Medication Reason    levothyroxine (EUTHYROX) 88 MCG tablet REORDER     Return in about 6 months (around 6/28/2022) for follow up. Reviewed with the patient: current clinical status,medications, activities and diet. Side effects, adverse effects of themedication prescribed today, as well as treatment plan/ rationale and result expectations have been discussed with the patient who expresses understanding and desires to proceed. Close follow up to evaluate treatment results and for coordination of care. I have reviewed the patient's medical history in detail and updated the computerized patient record. Please note, this report has been partially produced using speech recognition software and may cause  and /or contain errors related to that system including grammar, punctuation and spelling as well as words and phrases that may seem inappropriate. If there are questions or concerns please feel free to contact me to clarify.     Addi Dodson, DO

## 2021-12-28 NOTE — PATIENT INSTRUCTIONS
more stretch, put your other leg flat on the floor while pulling your knee to your chest.  Curl-ups    1. Lie on the floor on your back with your knees bent at a 90-degree angle. Your feet should be flat on the floor, about 12 inches from your buttocks. 2. Cross your arms over your chest. If this bothers your neck, try putting your hands behind your neck (not your head), with your elbows spread apart. 3. Slowly tighten your belly muscles and raise your shoulder blades off the floor. 4. Keep your head in line with your body, and do not press your chin to your chest.  5. Hold this position for 1 or 2 seconds, then slowly lower yourself back down to the floor. 6. Repeat 8 to 12 times. Pelvic tilt exercise    1. Lie on your back with your knees bent. 2. \"Brace\" your stomach. This means to tighten your muscles by pulling in and imagining your belly button moving toward your spine. You should feel like your back is pressing to the floor and your hips and pelvis are rocking back. 3. Hold for about 6 seconds while you breathe smoothly. 4. Repeat 8 to 12 times. Heel dig bridging    1. Lie on your back with both knees bent and your ankles bent so that only your heels are digging into the floor. Your knees should be bent about 90 degrees. 2. Then push your heels into the floor, squeeze your buttocks, and lift your hips off the floor until your shoulders, hips, and knees are all in a straight line. 3. Hold for about 6 seconds as you continue to breathe normally, and then slowly lower your hips back down to the floor and rest for up to 10 seconds. 4. Do 8 to 12 repetitions. Hamstring stretch in doorway    1. Lie on your back in a doorway, with one leg through the open door. 2. Slide your leg up the wall to straighten your knee. You should feel a gentle stretch down the back of your leg. 3. Hold the stretch for at least 15 to 30 seconds. Do not arch your back, point your toes, or bend either knee.  Keep one heel

## 2022-01-13 RX ORDER — PRIMIDONE 50 MG/1
150 TABLET ORAL NIGHTLY
Qty: 270 TABLET | Refills: 3 | Status: SHIPPED | OUTPATIENT
Start: 2022-01-13 | End: 2022-10-05

## 2022-01-13 NOTE — TELEPHONE ENCOUNTER
Patient is requesting medication refill.  Please approve or deny this request.    Rx requested:  Requested Prescriptions     Pending Prescriptions Disp Refills    primidone (MYSOLINE) 50 MG tablet 270 tablet 3     Sig: Take 3 tablets by mouth nightly TAKE 3 TABLETS NIGHTLY         Last Office Visit:   9/29/2021      Next Visit Date:  Future Appointments   Date Time Provider Hemant Berry   3/30/2022  3:45 PM MD Gayatri Conteh   6/28/2022  2:00 PM Trey Soler DO [facility] [address] [facility]

## 2022-02-07 RX ORDER — BUSPIRONE HYDROCHLORIDE 10 MG/1
TABLET ORAL
Qty: 90 TABLET | Refills: 0 | Status: SHIPPED | OUTPATIENT
Start: 2022-02-07 | End: 2022-03-08

## 2022-03-08 RX ORDER — BUSPIRONE HYDROCHLORIDE 10 MG/1
TABLET ORAL
Qty: 90 TABLET | Refills: 0 | Status: SHIPPED | OUTPATIENT
Start: 2022-03-08 | End: 2022-04-11 | Stop reason: SDUPTHER

## 2022-03-08 NOTE — TELEPHONE ENCOUNTER
Pharmacy is requesting medication refill.  Please approve or deny this request.    Rx requested:  Requested Prescriptions     Pending Prescriptions Disp Refills    busPIRone (BUSPAR) 10 MG tablet [Pharmacy Med Name: busPIRone HCl 10 MG Oral Tablet] 90 tablet 0     Sig: TAKE 1 TABLET BY MOUTH THREE TIMES DAILY         Last Office Visit:   9/29/2021      Next Visit Date:  Future Appointments   Date Time Provider Hemant Berry   3/30/2022  3:45 PM Liliam Llamas MD [facility]   6/28/2022  2:00 PM Zully Watkins DO [facility] [address] [place] [address]

## 2022-03-30 ENCOUNTER — OFFICE VISIT (OUTPATIENT)
Dept: NEUROLOGY | Age: 72
End: 2022-03-30
Payer: MEDICARE

## 2022-03-30 VITALS
HEIGHT: 72 IN | WEIGHT: 201.4 LBS | OXYGEN SATURATION: 96 % | SYSTOLIC BLOOD PRESSURE: 98 MMHG | TEMPERATURE: 96.9 F | BODY MASS INDEX: 27.28 KG/M2 | DIASTOLIC BLOOD PRESSURE: 66 MMHG | RESPIRATION RATE: 16 BRPM | HEART RATE: 92 BPM

## 2022-03-30 DIAGNOSIS — M48.062 SPINAL STENOSIS OF LUMBAR REGION WITH NEUROGENIC CLAUDICATION: ICD-10-CM

## 2022-03-30 DIAGNOSIS — G25.0 ESSENTIAL TREMOR: Primary | ICD-10-CM

## 2022-03-30 PROCEDURE — 1036F TOBACCO NON-USER: CPT | Performed by: PSYCHIATRY & NEUROLOGY

## 2022-03-30 PROCEDURE — G8482 FLU IMMUNIZE ORDER/ADMIN: HCPCS | Performed by: PSYCHIATRY & NEUROLOGY

## 2022-03-30 PROCEDURE — G8427 DOCREV CUR MEDS BY ELIG CLIN: HCPCS | Performed by: PSYCHIATRY & NEUROLOGY

## 2022-03-30 PROCEDURE — 99213 OFFICE O/P EST LOW 20 MIN: CPT | Performed by: PSYCHIATRY & NEUROLOGY

## 2022-03-30 PROCEDURE — 3017F COLORECTAL CA SCREEN DOC REV: CPT | Performed by: PSYCHIATRY & NEUROLOGY

## 2022-03-30 PROCEDURE — 4040F PNEUMOC VAC/ADMIN/RCVD: CPT | Performed by: PSYCHIATRY & NEUROLOGY

## 2022-03-30 PROCEDURE — G8417 CALC BMI ABV UP PARAM F/U: HCPCS | Performed by: PSYCHIATRY & NEUROLOGY

## 2022-03-30 PROCEDURE — 1123F ACP DISCUSS/DSCN MKR DOCD: CPT | Performed by: PSYCHIATRY & NEUROLOGY

## 2022-03-30 ASSESSMENT — ENCOUNTER SYMPTOMS
BACK PAIN: 1
SHORTNESS OF BREATH: 0
CHOKING: 0
TROUBLE SWALLOWING: 0
VOMITING: 0
COLOR CHANGE: 0
PHOTOPHOBIA: 0
NAUSEA: 0

## 2022-03-30 NOTE — PROGRESS NOTES
Subjective:      Patient ID: Lottie Meigs is a 66 y.o. male who presents today for:  Chief Complaint   Patient presents with    6 Month Follow-Up     from 9/29/2021 due to Essential tremor and spinal stenosis of lumbar region w/ neurogenic claudication. Pt states he is still having trouble writing. Pt denies any worsening of the tremors. 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 Patient on Mysoline 100 mg and risperidone. RI did not show spinal stenosis though arthritic changes are notable. Patient had thoracic pain and thoracic MRI was normal.  And reported worsening of tremor when last seen. We then increased his Mysoline to 250 mg. Anxiety is making his tremors worse patient's tremors are not worse. Patient has no side effects of Mysoline. His main issues appear to his back. He takes ibuprofen once in a while maybe once in 2 weeks. He uses 800 mg. He works and is to stand on concrete long hours but is allowed to sit. This bothers him in the center of his spine. Is not any numbness or tingling.     Past Medical History:   Diagnosis Date    Anemia     Anxiety, generalized     BPH (benign prostatic hyperplasia)     CRF (chronic renal failure)     stage 3 -   5/24/17 patient denies    Depression     Has a tremor     HIGH CHOLESTEROL     Hypothyroid     IBD (inflammatory bowel disease)     Insomnia     Kidney stones     Seasonal allergic conjunctivitis     Tinnitus     chronic      Past Surgical History:   Procedure Laterality Date    CATARACT REMOVAL Bilateral     COLONOSCOPY      KNEE SURGERY      Bilat- knee,legs    LA COLON CA SCRN NOT HI RSK IND N/A 05/24/2017    COLONOSCOPY performed by Sawyer Lora MD at [address] [facility] shoulder repair   [address]     Social History     Socioeconomic History    Marital status:      Spouse name: Velazco Comment Number of children: Not on file    Years of education: Not on file    Highest education level: Not on file   Occupational History    Not on file   Tobacco Use    Smoking status: Former Smoker     Packs/day: 0.50     Years: 30.00     Pack years: 15.00     Types: Pipe     Quit date: 2002     Years since quittin.2    Smokeless tobacco: Never Used   Vaping Use    Vaping Use: Never used   Substance and Sexual Activity    Alcohol use: Yes     Comment: Occasional    Drug use: No    Sexual activity: Yes     Partners: Female   Other Topics Concern    Not on file   Social History Narrative    Not on file     Social Determinants of Health     Financial Resource Strain:     Difficulty of Paying Living Expenses: Not on file   Food Insecurity:     Worried About Running Out of Food in the Last Year: Not on file    Kelley of Food in the Last Year: Not on file   Transportation Needs:     Lack of Transportation (Medical): Not on file    Lack of Transportation (Non-Medical): Not on file   Physical Activity:     Days of Exercise per Week: Not on file    Minutes of Exercise per Session: Not on file   Stress:     Feeling of Stress : Not on file   Social Connections:     Frequency of Communication with Friends and Family: Not on file    Frequency of Social Gatherings with Friends and Family: Not on file    Attends [employer] Services: Not on file    Active Member of [address] [place] [id number] or Organizations: Not on file    Attends Club or Organization Meetings: Not on file    Marital Status: Not on file   Intimate Partner Violence:     Fear of Current or Ex-Partner: Not on file    Emotionally Abused: Not on file    Physically Abused: Not on file    Sexually Abused: Not on file   Housing Stability:     Unable to Pay for Housing in the Last Year: Not on file    Number of [place] in the Last Year: Not on file    Unstable Housing in the Last Year: Not on file     Family History   Adopted:  Yes     Allergies   Allergen Reactions    Food Other (See Comments)     Burning sensation in stomach    Lactose Intolerance (Gi) Other (See Comments)     Sour stomach    Nsaids      Decreased kidney function       Current Outpatient Medications   Medication Sig Dispense Refill    busPIRone (BUSPAR) 10 MG tablet TAKE 1 TABLET BY MOUTH THREE TIMES DAILY 90 tablet 0    primidone (MYSOLINE) 50 MG tablet Take 3 tablets by mouth nightly TAKE 3 TABLETS NIGHTLY 270 tablet 3    hydrocortisone 2.5 % cream Apply topically 2 times daily. 453 g 0    ibuprofen (ADVIL;MOTRIN) 800 MG tablet Take 1 tablet by mouth every 8 hours as needed for Pain 45 tablet 0    atorvastatin (LIPITOR) 40 MG tablet TAKE ONE TABLET BY MOUTH NIGHTLY 90 tablet 3    traZODone (DESYREL) 100 MG tablet Take 1 tablet by mouth nightly as needed for Sleep 90 tablet 1    potassium citrate (UROCIT-K) 10 MEQ (1080 MG) extended release tablet Take 10 mEq by mouth 3 times daily (with meals)      Multiple Vitamins-Minerals (MENS 50+ MULTI VITAMIN/MIN PO) Take 1 tablet by mouth daily      levothyroxine (EUTHYROX) 88 MCG tablet Take 1 tablet by mouth Daily Take 1 tablet by mouth once daily 90 tablet 1     No current facility-administered medications for this visit. Review of Systems   Constitutional: Negative for fever. HENT: Negative for ear pain, tinnitus and trouble swallowing. Eyes: Negative for photophobia and visual disturbance. Respiratory: Negative for choking and shortness of breath. Cardiovascular: Negative for chest pain and palpitations. Gastrointestinal: Negative for nausea and vomiting. Musculoskeletal: Positive for back pain. Negative for gait problem, joint swelling, myalgias, neck pain and neck stiffness. Skin: Negative for color change. Allergic/Immunologic: Negative for food allergies. Neurological: Positive for tremors.  Negative for dizziness, seizures, syncope, facial asymmetry, speech difficulty, weakness, light-headedness, numbness and headaches. Psychiatric/Behavioral: Negative for behavioral problems, confusion, hallucinations and sleep disturbance. Objective:   BP 98/66 (Site: Right Upper Arm, Position: Sitting, Cuff Size: Medium Adult)   Pulse 92   Temp 96.9 °F (36.1 °C) (Temporal)   Resp 16   Ht 6' (1.829 m)   Wt 201 lb 6.4 oz (91.4 kg)   SpO2 96%   BMI 27.31 kg/m²     Physical Exam  Vitals reviewed. Eyes:      Pupils: Pupils are equal, round, and reactive to light. Cardiovascular:      Rate and Rhythm: Normal rate and regular rhythm. Heart sounds: No murmur heard. Pulmonary:      Effort: Pulmonary effort is normal.      Breath sounds: Normal breath sounds. Abdominal:      General: Bowel sounds are normal.   Musculoskeletal:         General: Normal range of motion. Cervical back: Normal range of motion. Skin:     General: Skin is warm. Neurological:      Mental Status: He is alert and oriented to person, place, and time. Cranial Nerves: No cranial nerve deficit. Sensory: No sensory deficit. Motor: No abnormal muscle tone. Coordination: Coordination normal.      Deep Tendon Reflexes: Reflexes are normal and symmetric. Babinski sign absent on the right side. Babinski sign absent on the left side. Psychiatric:         Mood and Affect: Mood normal.     Tremors  is notable on outstretched hand this is somewhat much better than when last seen. Patient is areflexic in the lower extremity    MRI THORACIC SPINE WO CONTRAST    Result Date: 4/8/2021  EXAMINATION: MRI THORACIC SPINE WO CONTRAST DATE AND TIME:4/7/2021 6:23 PM CLINICAL HISTORY: Severe thoracic spine pain. S32.010A Closed wedge compression fracture of L1 vertebra, initial encounter ([facility] [address] ICD10 COMPARISON: If available previous films were reviewed for comparison. Technique: Multiplanar multisequence MRI scans of the thoracic spine. Findings:     Bones: The thoracic vertebrae are normally aligned with no evidence of fracture. Vertebral body hemangiomas involving T5 and T9. Otherwise, there is normal marrow signal throughout the thoracic spine. Disc space: There is no focal disc herniation or evidence for spinal canal stenosis. Disc spaces are age-appropriate. Spinal cord: The thoracic cord is normal in configuration and signal intensity. Soft tissues: There is no paraspinal soft tissue mass or fluid collection. NEGATIVE MRI OF THE THORACIC SPINE      Lab Results   Component Value Date    WBC 5.7 07/27/2020    RBC 4.87 07/27/2020    HGB 16.2 07/27/2020    HCT 47.3 07/27/2020    MCV 97.2 07/27/2020    MCH 33.3 07/27/2020    MCHC 34.2 07/27/2020    RDW 12.7 07/27/2020     07/27/2020    MPV 9.8 05/05/2014     Lab Results   Component Value Date     08/05/2021    K 4.3 08/05/2021     08/05/2021    CO2 24 08/05/2021    BUN 14 08/05/2021    CREATININE 1.44 08/05/2021    GFRAA 58.2 08/05/2021    LABGLOM 48.1 08/05/2021    GLUCOSE 77 08/05/2021    PROT 6.7 03/04/2021    LABALBU 4.6 08/05/2021    CALCIUM 9.9 08/05/2021    BILITOT 0.6 03/04/2021    ALKPHOS 105 03/04/2021    AST 23 03/04/2021    ALT 19 03/04/2021     Lab Results   Component Value Date    PROTIME 10.7 05/05/2014    INR 1.0 05/05/2014     Lab Results   Component Value Date    TSH 0.870 03/04/2021    KMRFEFMN16 700 03/04/2021    FOLATE >20.0 03/04/2021     Lab Results   Component Value Date    TRIG 102 09/24/2019    HDL 39 03/04/2021    LDLCALC 104 03/04/2021     No results found for: Trinity Pace, LABBENZ, CANNAB, COCAINESCRN, LABMETH, OPIATESCREENURINE, PHENCYCLIDINESCREENURINE, PPXUR, ETOH  No results found for: LITHIUM, DILFRTOT, VALPROATE    Assessment:       Diagnosis Orders   1. Essential tremor     2. Spinal stenosis of lumbar region with neurogenic claudication     Essential tremors which are responded very well to Mysoline. Patient is on 250 mg now.   Increase this during his last visit she is tolerating this very well and started his new symptoms. He does not have Parkinson's disease. His tremors are worse when he is anxious. Patient's main issues appears to be back pain. We have investigated him extensively with an MRI of the thoracic as well as lumbar spine. He has arthritic changes not surgical.  I have added Zanaflex at night to see if this relax his muscles and use heat and ice. He is reluctant to see a pain management for injection. We will follow in a few months and earlier if there are any concerns      Plan:      No orders of the defined types were placed in this encounter. No orders of the defined types were placed in this encounter. No follow-ups on file.       Loli Orosco MD

## 2022-03-31 RX ORDER — TIZANIDINE 4 MG/1
4 TABLET ORAL EVERY EVENING
Qty: 30 TABLET | Refills: 0 | Status: SHIPPED | OUTPATIENT
Start: 2022-03-31 | End: 2022-04-29

## 2022-03-31 NOTE — TELEPHONE ENCOUNTER
Requested Prescriptions     Pending Prescriptions Disp Refills    tiZANidine (ZANAFLEX) 4 MG tablet 30 tablet 0     Sig: Take 1 tablet by mouth every evening     Per your office note yesterday, med wasn't sent but it in dictation.

## 2022-04-11 RX ORDER — BUSPIRONE HYDROCHLORIDE 10 MG/1
TABLET ORAL
Qty: 90 TABLET | Refills: 0 | Status: SHIPPED | OUTPATIENT
Start: 2022-04-11 | End: 2022-05-09

## 2022-04-29 RX ORDER — TIZANIDINE 4 MG/1
TABLET ORAL
Qty: 30 TABLET | Refills: 2 | Status: SHIPPED | OUTPATIENT
Start: 2022-04-29 | End: 2022-06-28 | Stop reason: ALTCHOICE

## 2022-04-29 NOTE — TELEPHONE ENCOUNTER
Pharmacy is requesting medication refill.  Please approve or deny this request.    Rx requested:  Requested Prescriptions     Pending Prescriptions Disp Refills    tiZANidine (ZANAFLEX) 4 MG tablet [Pharmacy Med Name: tiZANidine HCl 4 MG Oral Tablet] 30 tablet 2     Sig: TAKE 1 TABLET BY MOUTH ONCE DAILY IN THE EVENING         Last Office Visit:   3/30/2022      Next Visit Date:  Future Appointments   Date Time Provider Hemant Berry   6/28/2022  2:00 PM DO JAZZ Bryant [facility]   10/5/2022  4:30 PM Alva Otero MD [facility]

## 2022-05-06 ENCOUNTER — COMMUNITY OUTREACH (OUTPATIENT)
Dept: INTERNAL MEDICINE | Age: 72
End: 2022-05-06

## 2022-05-09 RX ORDER — BUSPIRONE HYDROCHLORIDE 10 MG/1
TABLET ORAL
Qty: 3 TABLET | Refills: 0 | Status: SHIPPED | OUTPATIENT
Start: 2022-05-09 | End: 2022-05-09 | Stop reason: SDUPTHER

## 2022-05-09 NOTE — TELEPHONE ENCOUNTER
Requested Prescriptions     Pending Prescriptions Disp Refills    busPIRone (BUSPAR) 10 MG tablet 90 tablet 3     Sig: Take 1 tablet by mouth 3 times daily     Wrong dose pended in last encounter.

## 2022-05-10 RX ORDER — BUSPIRONE HYDROCHLORIDE 10 MG/1
10 TABLET ORAL 3 TIMES DAILY
Qty: 90 TABLET | Refills: 3 | Status: SHIPPED | OUTPATIENT
Start: 2022-05-10 | End: 2022-06-09

## 2022-05-10 RX ORDER — BUSPIRONE HYDROCHLORIDE 10 MG/1
TABLET ORAL
Qty: 90 TABLET | Refills: 0 | OUTPATIENT
Start: 2022-05-10

## 2022-05-23 ENCOUNTER — TELEPHONE (OUTPATIENT)
Dept: NEUROLOGY | Age: 72
End: 2022-05-23

## 2022-06-12 DIAGNOSIS — E03.4 HYPOTHYROIDISM DUE TO ACQUIRED ATROPHY OF THYROID: ICD-10-CM

## 2022-06-12 NOTE — TELEPHONE ENCOUNTER
Rx requested:  Requested Prescriptions     Pending Prescriptions Disp Refills    EUTHYROX 88 MCG tablet [Pharmacy Med Name: Euthyrox 80 MCG Oral Tablet] 90 tablet 0     Sig: Take 1 tablet by mouth once daily         Last Office Visit:   12/28/2021      Next Visit Date:  Future Appointments   Date Time Provider Hemant Berry   6/28/2022  2:00 PM [facility]   10/5/2022  4:30 PM Eva Persaud MD [facility]

## 2022-06-13 RX ORDER — LEVOTHYROXINE SODIUM 88 UG/1
TABLET ORAL
Qty: 90 TABLET | Refills: 0 | Status: SHIPPED | OUTPATIENT
Start: 2022-06-13 | End: 2022-08-31

## 2022-06-20 RX ORDER — ATORVASTATIN CALCIUM 40 MG/1
TABLET, FILM COATED ORAL
Qty: 90 TABLET | Refills: 3 | Status: SHIPPED | OUTPATIENT
Start: 2022-06-20

## 2022-06-20 NOTE — TELEPHONE ENCOUNTER
Patient requesting medication refill.  Please approve or deny this request.    Rx requested:  Requested Prescriptions     Pending Prescriptions Disp Refills    atorvastatin (LIPITOR) 40 MG tablet 90 tablet 3     Sig: TAKE ONE TABLET BY MOUTH NIGHTLY         Last Office Visit:   12/28/2021      Next Visit Date:  Future Appointments   Date Time Provider Hemant Berry   6/28/2022  2:00 PM Amelia Lezama DO [facility]   10/5/2022  4:30 PM Jaylon Tripp MD [facility]

## 2022-06-28 ENCOUNTER — OFFICE VISIT (OUTPATIENT)
Dept: FAMILY MEDICINE CLINIC | Age: 72
End: 2022-06-28

## 2022-06-28 ENCOUNTER — OFFICE VISIT (OUTPATIENT)
Dept: FAMILY MEDICINE CLINIC | Age: 72
End: 2022-06-28
Payer: MEDICARE

## 2022-06-28 ENCOUNTER — TELEPHONE (OUTPATIENT)
Dept: FAMILY MEDICINE CLINIC | Age: 72
End: 2022-06-28

## 2022-06-28 VITALS
TEMPERATURE: 97.5 F | DIASTOLIC BLOOD PRESSURE: 81 MMHG | OXYGEN SATURATION: 97 % | HEIGHT: 72 IN | SYSTOLIC BLOOD PRESSURE: 132 MMHG | BODY MASS INDEX: 26.3 KG/M2 | HEART RATE: 74 BPM | WEIGHT: 194.2 LBS

## 2022-06-28 VITALS
SYSTOLIC BLOOD PRESSURE: 132 MMHG | DIASTOLIC BLOOD PRESSURE: 81 MMHG | HEIGHT: 72 IN | TEMPERATURE: 97.5 F | OXYGEN SATURATION: 97 % | HEART RATE: 74 BPM | BODY MASS INDEX: 26.3 KG/M2 | WEIGHT: 194.2 LBS

## 2022-06-28 DIAGNOSIS — N18.30 STAGE 3 CHRONIC KIDNEY DISEASE, UNSPECIFIED WHETHER STAGE 3A OR 3B CKD (HCC): ICD-10-CM

## 2022-06-28 DIAGNOSIS — E03.4 HYPOTHYROIDISM DUE TO ACQUIRED ATROPHY OF THYROID: Primary | ICD-10-CM

## 2022-06-28 DIAGNOSIS — F33.41 RECURRENT MAJOR DEPRESSIVE DISORDER IN PARTIAL REMISSION (HCC): ICD-10-CM

## 2022-06-28 DIAGNOSIS — Z00.00 MEDICARE ANNUAL WELLNESS VISIT, SUBSEQUENT: Primary | ICD-10-CM

## 2022-06-28 DIAGNOSIS — L30.9 DERMATITIS: ICD-10-CM

## 2022-06-28 PROCEDURE — 1123F ACP DISCUSS/DSCN MKR DOCD: CPT | Performed by: STUDENT IN AN ORGANIZED HEALTH CARE EDUCATION/TRAINING PROGRAM

## 2022-06-28 PROCEDURE — G8417 CALC BMI ABV UP PARAM F/U: HCPCS | Performed by: STUDENT IN AN ORGANIZED HEALTH CARE EDUCATION/TRAINING PROGRAM

## 2022-06-28 PROCEDURE — 99214 OFFICE O/P EST MOD 30 MIN: CPT | Performed by: STUDENT IN AN ORGANIZED HEALTH CARE EDUCATION/TRAINING PROGRAM

## 2022-06-28 PROCEDURE — G8427 DOCREV CUR MEDS BY ELIG CLIN: HCPCS | Performed by: STUDENT IN AN ORGANIZED HEALTH CARE EDUCATION/TRAINING PROGRAM

## 2022-06-28 PROCEDURE — 3017F COLORECTAL CA SCREEN DOC REV: CPT | Performed by: STUDENT IN AN ORGANIZED HEALTH CARE EDUCATION/TRAINING PROGRAM

## 2022-06-28 PROCEDURE — 1036F TOBACCO NON-USER: CPT | Performed by: STUDENT IN AN ORGANIZED HEALTH CARE EDUCATION/TRAINING PROGRAM

## 2022-06-28 PROCEDURE — G0439 PPPS, SUBSEQ VISIT: HCPCS | Performed by: STUDENT IN AN ORGANIZED HEALTH CARE EDUCATION/TRAINING PROGRAM

## 2022-06-28 RX ORDER — BUSPIRONE HYDROCHLORIDE 10 MG/1
10 TABLET ORAL 3 TIMES DAILY
COMMUNITY
End: 2022-10-05 | Stop reason: SDUPTHER

## 2022-06-28 SDOH — ECONOMIC STABILITY: FOOD INSECURITY: WITHIN THE PAST 12 MONTHS, THE FOOD YOU BOUGHT JUST DIDN'T LAST AND YOU DIDN'T HAVE MONEY TO GET MORE.: NEVER TRUE

## 2022-06-28 SDOH — ECONOMIC STABILITY: FOOD INSECURITY: WITHIN THE PAST 12 MONTHS, YOU WORRIED THAT YOUR FOOD WOULD RUN OUT BEFORE YOU GOT MONEY TO BUY MORE.: NEVER TRUE

## 2022-06-28 ASSESSMENT — PATIENT HEALTH QUESTIONNAIRE - PHQ9
SUM OF ALL RESPONSES TO PHQ QUESTIONS 1-9: 11
4. FEELING TIRED OR HAVING LITTLE ENERGY: 1
2. FEELING DOWN, DEPRESSED OR HOPELESS: 1
9. THOUGHTS THAT YOU WOULD BE BETTER OFF DEAD, OR OF HURTING YOURSELF: 2
1. LITTLE INTEREST OR PLEASURE IN DOING THINGS: 0
5. POOR APPETITE OR OVEREATING: 1
6. FEELING BAD ABOUT YOURSELF - OR THAT YOU ARE A FAILURE OR HAVE LET YOURSELF OR YOUR FAMILY DOWN: 3
SUM OF ALL RESPONSES TO PHQ QUESTIONS 1-9: 9
3. TROUBLE FALLING OR STAYING ASLEEP: 3
10. IF YOU CHECKED OFF ANY PROBLEMS, HOW DIFFICULT HAVE THESE PROBLEMS MADE IT FOR YOU TO DO YOUR WORK, TAKE CARE OF THINGS AT HOME, OR GET ALONG WITH OTHER PEOPLE: 1
SUM OF ALL RESPONSES TO PHQ QUESTIONS 1-9: 11
SUM OF ALL RESPONSES TO PHQ9 QUESTIONS 1 & 2: 1
SUM OF ALL RESPONSES TO PHQ QUESTIONS 1-9: 11
8. MOVING OR SPEAKING SO SLOWLY THAT OTHER PEOPLE COULD HAVE NOTICED. OR THE OPPOSITE, BEING SO FIGETY OR RESTLESS THAT YOU HAVE BEEN MOVING AROUND A LOT MORE THAN USUAL: 0
7. TROUBLE CONCENTRATING ON THINGS, SUCH AS READING THE NEWSPAPER OR WATCHING TELEVISION: 0

## 2022-06-28 ASSESSMENT — LIFESTYLE VARIABLES
HOW MANY STANDARD DRINKS CONTAINING ALCOHOL DO YOU HAVE ON A TYPICAL DAY: 1 OR 2
HOW OFTEN DO YOU HAVE A DRINK CONTAINING ALCOHOL: MONTHLY OR LESS

## 2022-06-28 ASSESSMENT — COLUMBIA-SUICIDE SEVERITY RATING SCALE - C-SSRS
2. HAVE YOU ACTUALLY HAD ANY THOUGHTS OF KILLING YOURSELF?: NO
6. HAVE YOU EVER DONE ANYTHING, STARTED TO DO ANYTHING, OR PREPARED TO DO ANYTHING TO END YOUR LIFE?: NO
1. WITHIN THE PAST MONTH, HAVE YOU WISHED YOU WERE DEAD OR WISHED YOU COULD GO TO SLEEP AND NOT WAKE UP?: YES

## 2022-06-28 ASSESSMENT — SOCIAL DETERMINANTS OF HEALTH (SDOH): HOW HARD IS IT FOR YOU TO PAY FOR THE VERY BASICS LIKE FOOD, HOUSING, MEDICAL CARE, AND HEATING?: NOT HARD AT ALL

## 2022-06-28 NOTE — PROGRESS NOTES
Medicare Annual Wellness Visit    Mele Cook is here for Medicare AWV and Health Maintenance (Refuses colonoscopy, cologuard or FIT test. )    Assessment & Plan   Medicare annual wellness visit, subsequent      Recommendations for Preventive Services Due: see orders and patient instructions/AVS.  Recommended screening schedule for the next 5-10 years is provided to the patient in written form: see Patient Instructions/AVS.     Return for Medicare Annual Wellness Visit in 1 year. Subjective       Patient's complete Health Risk Assessment and screening values have been reviewed and are found in Flowsheets. The following problems were reviewed today and where indicated follow up appointments were made and/or referrals ordered.     Positive Risk Factor Screenings with Interventions:      Depression:  PHQ-2 Score: 1  PHQ-9 Total Score: 11    Severity:1-4 = minimal depression, 5-9 = mild depression, 10-14 = moderate depression, 15-19 = moderately severe depression, 20-27 = severe depression    Depression Interventions:  · Relaxation techniques discussed            General Health and ACP:  General  In general, how would you say your health is?: Very Good  In the past 7 days, have you experienced any of the following: New or Increased Pain, New or Increased Fatigue, Loneliness, Social Isolation, Stress or Anger?: (!) Yes  Select all that apply: (!) New or Increased Pain,Stress,Anger  Do you get the social and emotional support that you need?: Yes  Do you have a Living Will?: Yes    Advance Directives     Power of  Living Will ACP-Advance Directive ACP-Power of     Not on File Coral loyd on 05/25/17 Filed Not on File      General Health Risk Interventions:  · Pain issues: home exercises provided  · Stress: relaxation techniques discussed  · Anger: relaxation techniques discussed    Health Habits/Nutrition:     Physical Activity: Sufficiently Active    Days of Exercise per Week: 3 days    Minutes of Exercise per Session: 150+ min     Have you lost any weight without trying in the past 3 months?: (!) Yes  Body mass index: (!) 26.34  Have you seen the dentist within the past year?: (!) No    Health Habits/Nutrition Interventions:  · Nutritional issues:  educational materials for healthy, well-balanced diet provided    Hearing/Vision:  Do you or your family notice any trouble with your hearing that hasn't been managed with hearing aids?: (!) Yes  Do you have difficulty driving, watching TV, or doing any of your daily activities because of your eyesight?: No  Have you had an eye exam within the past year?: Yes  No exam data present    Hearing/Vision Interventions:  · Hearing concerns:  patient declines any further evaluation/treatment for hearing issues    Safety:  Do you have working smoke detectors?: Yes  Do you have any tripping hazards - loose or unsecured carpets or rugs?: (!) Yes  Do you have any tripping hazards - clutter in doorways, halls, or stairs?: No  Do you have either shower bars, grab bars, non-slip mats or non-slip surfaces in your shower or bathtub?: Yes  Do all of your stairways have a railing or banister?: Yes  Do you always fasten your seatbelt when you are in a car?: Yes    Safety Interventions:  · Home safety tips provided           Objective   Vitals:    06/28/22 1401   BP: 132/81   Pulse: 74   Temp: 97.5 °F (36.4 °C)   SpO2: 97%   Weight: 194 lb 3.2 oz (88.1 kg)   Height: 6' (1.829 m)      Body mass index is 26.34 kg/m². Allergies   Allergen Reactions    Food Other (See Comments)     Burning sensation in stomach    Lactose Intolerance (Gi) Other (See Comments)     Sour stomach    Nsaids      Decreased kidney function     Prior to Visit Medications    Medication Sig Taking?  Authorizing Provider   busPIRone (BUSPAR) 10 MG tablet Take 10 mg by mouth 3 times daily Yes Historical Provider, MD   atorvastatin (LIPITOR) 40 MG tablet TAKE ONE TABLET BY MOUTH NIGHTLY Yes Naima URRUTIA DO Jena   EUTHYROX 88 MCG tablet Take 1 tablet by mouth once daily Yes Nate Syed DO   primidone (MYSOLINE) 50 MG tablet Take 3 tablets by mouth nightly TAKE 3 TABLETS NIGHTLY Yes Marichuy Bran MD   hydrocortisone 2.5 % cream Apply topically 2 times daily.  Yes Silvia Zhang MD   ibuprofen (ADVIL;MOTRIN) 800 MG tablet Take 1 tablet by mouth every 8 hours as needed for Pain Yes Silvia Zhang MD   Multiple Vitamins-Minerals (MENS 50+ MULTI VITAMIN/MIN PO) Take 1 tablet by mouth daily Yes Historical Provider, MD   traZODone (DESYREL) 100 MG tablet Take 1 tablet by mouth nightly as needed for Sleep  Patient not taking: Reported on 6/28/2022  Silvia Zhang MD       [facility] (Including outside providers/suppliers regularly involved in providing care):   Patient Care Team:  Nate Syed [facility] as P[facility] - [facility] Empaneled Provider  Kvng Bellamy MD as Physician (Urology)  Marichuy Bran MD as Consulting Physician (Neurology)     Reviewed and updated this visit:  Tobacco  Allergies  Meds  Problems  Med Hx  Surg Hx  Soc Hx  Fam Hx

## 2022-06-28 NOTE — TELEPHONE ENCOUNTER
PT stated that he received the wrong after visit summary. He stated that he received his wifes AVS. He would like a copy of his mailed to him.

## 2022-06-28 NOTE — PROGRESS NOTES
Subjective  Roberto Kaye, 74 y.o. male presents today with:  Chief Complaint   Patient presents with    6 Month Follow-Up    Depression     Does struggle a little on occasion. Denies any thoughts of harming self.  Anxiety     Stuggles with this as well. States medication does help some.  Tremors     Sees neurology. States controlled at this time.  Hypothyroidism    Hyperlipidemia       HPI    Patient with appointment for 6-month follow-up. Patient following up on hypothyroidism. He is currently on Euthyrox 88 mcg daily. Tolerating well. No side effects. No changes to hair skin or nails. No heat or cold intolerance. No weight changes. Patient also with anxiety and depression. He does struggle with this at times. He is on BuSpar 10 mg 3 times daily. He states that this is working well for him. No side effects from the medication. He states that he struggles a lot at times with anxiety. No thoughts of hurting himself or hurting anyone else. He states some days are worse than others. Lots of family trauma. Patient also with hyperlipidemia. He is on atorvastatin 40 mg daily. No side effects. Patient also with tremors. He does follow with neurology. He is on primidone. He states that they are slightly improved, however, he does struggle with writing especially. Neck sees neurology in a couple months. Patient also with rash today. He states that it is been treated in the past with steroid cream.  This worked well. No other concerns at this time. Review of Systems   Constitutional: Negative for chills, fatigue, fever and unexpected weight change. HENT: Negative for congestion, rhinorrhea and sore throat. Eyes: Negative for discharge. Respiratory: Negative for cough, shortness of breath and wheezing. Cardiovascular: Negative for chest pain, palpitations and leg swelling. Gastrointestinal: Negative for abdominal pain, diarrhea, nausea and vomiting. Endocrine: Negative for cold intolerance and heat intolerance. Musculoskeletal: Negative for arthralgias and joint swelling. Skin: Positive for rash. Neurological: Negative for weakness, light-headedness, numbness and headaches. Psychiatric/Behavioral: Positive for sleep disturbance. Negative for confusion and suicidal ideas. The patient is nervous/anxious. Past Medical History:   Diagnosis Date    Anemia     Anxiety, generalized     BPH (benign prostatic hyperplasia)     CRF (chronic renal failure)     stage 3 -   5/24/17 patient denies    Depression     Has a tremor     HIGH CHOLESTEROL     Hypothyroid     IBD (inflammatory bowel disease)     Insomnia     Kidney stones     Seasonal allergic conjunctivitis     Tinnitus     chronic      Past Surgical History:   Procedure Laterality Date    CATARACT REMOVAL Bilateral     COLONOSCOPY      KNEE SURGERY      Bilat- knee,legs    [place] COLON CA SCRN NOT HI [facility] IND N/A 05/24/2017    COLONOSCOPY performed by Alberto Cedeño MD [facility] repair    THYROID SURGERY  1999     Current Outpatient Medications   Medication Sig Dispense Refill    busPIRone (BUSPAR) 10 MG tablet Take 10 mg by mouth 3 times daily      hydrocortisone 2.5 % cream Apply topically 2 times daily.  453 g 0    atorvastatin (LIPITOR) 40 MG tablet TAKE ONE TABLET BY MOUTH NIGHTLY 90 tablet 3    EUTHYROX 88 MCG tablet Take 1 tablet by mouth once daily 90 tablet 0    primidone (MYSOLINE) 50 MG tablet Take 3 tablets by mouth nightly TAKE 3 TABLETS NIGHTLY 270 tablet 3    ibuprofen (ADVIL;MOTRIN) 800 MG tablet Take 1 tablet by mouth every 8 hours as needed for Pain 45 tablet 0    Multiple Vitamins-Minerals (MENS 50+ MULTI VITAMIN/MIN PO) Take 1 tablet by mouth daily      traZODone (DESYREL) 100 MG tablet Take 1 tablet by mouth nightly as needed for Sleep (Patient not taking: Reported on 6/28/2022) 90 tablet 1     No current facility-administered medications for this visit. PMH, Surgical Hx, Family Hx, and Social Hx reviewed and updated. Health Maintenance reviewed. Objective    Vitals:    06/28/22 1353   BP: 132/81   Pulse: 74   Temp: 97.5 °F (36.4 °C)   SpO2: 97%   Weight: 194 lb 3.2 oz (88.1 kg)   Height: 6' (1.829 m)       Physical Exam  Vitals reviewed. Constitutional:       General: He is not in acute distress. HENT:      Head: Normocephalic and atraumatic. Eyes:      Conjunctiva/sclera: Conjunctivae normal.   Neck:      Thyroid: No thyromegaly or thyroid tenderness. Cardiovascular:      Rate and Rhythm: Normal rate and regular rhythm. Heart sounds: No murmur heard. No friction rub. No gallop. Pulmonary:      Effort: Pulmonary effort is normal.      Breath sounds: Normal breath sounds. No wheezing, rhonchi or rales. Abdominal:      General: Bowel sounds are normal. There is no distension. Palpations: Abdomen is soft. Tenderness: There is no abdominal tenderness. Musculoskeletal:         General: No swelling or deformity. Cervical back: Normal range of motion and neck supple. Right lower leg: No edema. Left lower leg: No edema. Lymphadenopathy:      Cervical: No cervical adenopathy. Skin:     General: Skin is warm and dry. Findings: Rash present. Neurological:      General: No focal deficit present. Mental Status: He is alert. Gait: Gait is intact. Psychiatric:         Mood and Affect: Mood normal.         Behavior: Behavior normal.        Assessment & Plan     1. Hypothyroidism due to acquired atrophy of thyroid  Stable, chronic. Continue Euthyrox 88 mcg daily. New prescription not needed at this time. Continue to monitor. Check labs. Follow-up as scheduled. 2. Stage 3 chronic kidney disease, unspecified whether stage 3a or 3b CKD (HCC)  Stable, chronic. Check labs. Review at next appointment.     3. Recurrent major depressive disorder in partial remission ([facility] [address]  Not currently controlled. Declines need for SSRI or counseling. Continue BuSpar. Continue to monitor. If symptoms worsen or change, return to care sooner. Patient voiced understanding. All questions answered. Follow-up as scheduled. 4. Dermatitis  Contact dermatitis. Will reorder hydrocortisone 2.5%. Apply to affected area. If symptoms not improving, return to care sooner. - hydrocortisone 2.5 % cream; Apply topically 2 times daily. Dispense: 453 g; Refill: 0    No orders of the defined types were placed in this encounter. Orders Placed This Encounter   Medications    hydrocortisone 2.5 % cream     Sig: Apply topically 2 times daily. Dispense:  453 g     Refill:  0     Medications Discontinued During This Encounter   Medication Reason    potassium citrate (UROCIT-K) 10 MEQ (1080 MG) extended release tablet Therapy completed    tiZANidine (ZANAFLEX) 4 MG tablet Therapy completed    hydrocortisone 2.5 % cream REORDER     Return in about 2 months (around 8/28/2022). Reviewed with the patient: current clinical status,medications, activities and diet. Side effects, adverse effects of themedication prescribed today, as well as treatment plan/ rationale and result expectations have been discussed with the patient who expresses understanding and desires to proceed. Close follow up to evaluate treatment results and for coordination of care. I have reviewed the patient's medical history in detail and updated the computerized patient record. Please note, this report has been partially produced using speech recognition software and may cause  and /or contain errors related to that system including grammar, punctuation and spelling as well as words and phrases that may seem inappropriate. If there are questions or concerns please feel free to contact me to clarify.     [facility], DO

## 2022-06-29 ASSESSMENT — ENCOUNTER SYMPTOMS
EYE DISCHARGE: 0
RHINORRHEA: 0
NAUSEA: 0
DIARRHEA: 0
SHORTNESS OF BREATH: 0
COUGH: 0
SORE THROAT: 0
WHEEZING: 0
VOMITING: 0
ABDOMINAL PAIN: 0

## 2022-07-14 DIAGNOSIS — E78.00 HYPERCHOLESTEROLEMIA: ICD-10-CM

## 2022-07-14 DIAGNOSIS — F34.1 DYSTHYMIA: ICD-10-CM

## 2022-07-14 DIAGNOSIS — E03.4 HYPOTHYROIDISM DUE TO ACQUIRED ATROPHY OF THYROID: ICD-10-CM

## 2022-07-14 LAB
ALBUMIN SERPL-MCNC: 4.3 G/DL (ref 3.5–4.6)
ALP BLD-CCNC: 109 U/L (ref 35–104)
ALT SERPL-CCNC: 19 U/L (ref 0–41)
ANION GAP SERPL CALCULATED.3IONS-SCNC: 11 MEQ/L (ref 9–15)
AST SERPL-CCNC: 20 U/L (ref 0–40)
BILIRUB SERPL-MCNC: 0.6 MG/DL (ref 0.2–0.7)
BUN BLDV-MCNC: 16 MG/DL (ref 8–23)
CALCIUM SERPL-MCNC: 9.3 MG/DL (ref 8.5–9.9)
CHLORIDE BLD-SCNC: 103 MEQ/L (ref 95–107)
CHOLESTEROL, FASTING: 148 MG/DL (ref 0–199)
CO2: 26 MEQ/L (ref 20–31)
CREAT SERPL-MCNC: 1.35 MG/DL (ref 0.7–1.2)
GFR AFRICAN AMERICAN: >60
GFR NON-AFRICAN AMERICAN: 51.6
GLOBULIN: 2.3 G/DL (ref 2.3–3.5)
GLUCOSE FASTING: 96 MG/DL (ref 70–99)
HDLC SERPL-MCNC: 38 MG/DL (ref 40–59)
LDL CHOLESTEROL CALCULATED: 88 MG/DL (ref 0–129)
POTASSIUM SERPL-SCNC: 5.1 MEQ/L (ref 3.4–4.9)
SODIUM BLD-SCNC: 140 MEQ/L (ref 135–144)
TOTAL PROTEIN: 6.6 G/DL (ref 6.3–8)
TRIGLYCERIDE, FASTING: 108 MG/DL (ref 0–150)
TSH REFLEX: 1 UIU/ML (ref 0.44–3.86)

## 2022-07-25 NOTE — RESULT ENCOUNTER NOTE
Please inform patient that his TSH level is normal.  Metabolic panel shows stable kidney function. Potassium slightly elevated. No need for intervention. Cholesterol is normal except for slightly low HDL or healthy cholesterol. Best way to raise this is with healthy diet and exercise.   Thanks

## 2022-08-31 DIAGNOSIS — E03.4 HYPOTHYROIDISM DUE TO ACQUIRED ATROPHY OF THYROID: ICD-10-CM

## 2022-08-31 RX ORDER — LEVOTHYROXINE SODIUM 88 UG/1
TABLET ORAL
Qty: 90 TABLET | Refills: 0 | Status: SHIPPED | OUTPATIENT
Start: 2022-08-31

## 2022-08-31 NOTE — TELEPHONE ENCOUNTER
Requested Prescriptions     Pending Prescriptions Disp Refills    EUTHYROX 88 MCG tablet [Pharmacy Med Name: Euthyrox 88 MCG Oral Tablet] 90 tablet 0     Sig: Take 1 tablet by mouth once daily

## 2022-09-07 ENCOUNTER — TELEPHONE (OUTPATIENT)
Dept: FAMILY MEDICINE CLINIC | Age: 72
End: 2022-09-07

## 2022-09-07 ENCOUNTER — OFFICE VISIT (OUTPATIENT)
Dept: FAMILY MEDICINE CLINIC | Age: 72
End: 2022-09-07
Payer: MEDICARE

## 2022-09-07 VITALS
HEART RATE: 82 BPM | WEIGHT: 192.4 LBS | HEIGHT: 72 IN | DIASTOLIC BLOOD PRESSURE: 68 MMHG | BODY MASS INDEX: 26.06 KG/M2 | SYSTOLIC BLOOD PRESSURE: 100 MMHG | TEMPERATURE: 97.1 F | OXYGEN SATURATION: 95 %

## 2022-09-07 DIAGNOSIS — N18.30 STAGE 3 CHRONIC KIDNEY DISEASE, UNSPECIFIED WHETHER STAGE 3A OR 3B CKD (HCC): ICD-10-CM

## 2022-09-07 DIAGNOSIS — E78.00 HYPERCHOLESTEROLEMIA: ICD-10-CM

## 2022-09-07 DIAGNOSIS — M47.816 ARTHRITIS OF LUMBAR SPINE: ICD-10-CM

## 2022-09-07 DIAGNOSIS — F41.9 ANXIETY: ICD-10-CM

## 2022-09-07 DIAGNOSIS — E03.4 HYPOTHYROIDISM DUE TO ACQUIRED ATROPHY OF THYROID: Primary | ICD-10-CM

## 2022-09-07 DIAGNOSIS — M54.41 ACUTE RIGHT-SIDED LOW BACK PAIN WITH RIGHT-SIDED SCIATICA: ICD-10-CM

## 2022-09-07 DIAGNOSIS — F34.1 DYSTHYMIA: ICD-10-CM

## 2022-09-07 PROCEDURE — 1036F TOBACCO NON-USER: CPT | Performed by: STUDENT IN AN ORGANIZED HEALTH CARE EDUCATION/TRAINING PROGRAM

## 2022-09-07 PROCEDURE — 3017F COLORECTAL CA SCREEN DOC REV: CPT | Performed by: STUDENT IN AN ORGANIZED HEALTH CARE EDUCATION/TRAINING PROGRAM

## 2022-09-07 PROCEDURE — G8417 CALC BMI ABV UP PARAM F/U: HCPCS | Performed by: STUDENT IN AN ORGANIZED HEALTH CARE EDUCATION/TRAINING PROGRAM

## 2022-09-07 PROCEDURE — G8427 DOCREV CUR MEDS BY ELIG CLIN: HCPCS | Performed by: STUDENT IN AN ORGANIZED HEALTH CARE EDUCATION/TRAINING PROGRAM

## 2022-09-07 PROCEDURE — 1123F ACP DISCUSS/DSCN MKR DOCD: CPT | Performed by: STUDENT IN AN ORGANIZED HEALTH CARE EDUCATION/TRAINING PROGRAM

## 2022-09-07 PROCEDURE — 99214 OFFICE O/P EST MOD 30 MIN: CPT | Performed by: STUDENT IN AN ORGANIZED HEALTH CARE EDUCATION/TRAINING PROGRAM

## 2022-09-07 RX ORDER — METHYLPREDNISOLONE 4 MG/1
TABLET ORAL
Qty: 21 TABLET | Refills: 0 | Status: SHIPPED | OUTPATIENT
Start: 2022-09-07 | End: 2022-09-13

## 2022-09-07 NOTE — PROGRESS NOTES
Subjective  Rachael Dill, 74 y.o. male presents today with:  Chief Complaint   Patient presents with    Follow-up    Depression     States his mood fluctuates. Sometimes he feels good & other times he feels down & depressed. Anxiety     Feels this is stable at this time. States it depends on the environment he's in. Hypothyroidism    Hyperlipidemia    Chronic Kidney Disease    Back Pain     Has been having some lower back pain with radiation into right hip. Denies any injury. States this has been going on for awhile now & seems to be getting worse. Has not done any stretches. Has not used heat or ice to the area & has not taken any OTC pain relievers. Health Maintenance     Refuses colonoscopy, cologuard & FIT test.        HPI    Patient with 6-month follow-up appointment for hypothyroidism, depression, anxiety, hypothyroidism, hyperlipidemia. Patient with depression and anxiety. He is currently on buspirone 10 mg. Tolerating well. He states that his mood still fluctuates some but he feels good most of the time. He states that he feels that his anxiety is stable. He states it really depends on what is going on in life and how that makes him feel. No suicidal or homicidal ideation. Patient also with hypothyroidism. He is currently on levothyroxine 88 mcg daily. Tolerating well. No changes to hair skin or nails. Takes medication as prescribed. Patient with hyperlipidemia. He is on atorvastatin 40 mg daily. Tolerating well. Patient also with ongoing back pain. He has low back pain with radiation into the right head. No injuries or trauma. He states its been going on for a while now. He thinks that it seems to be getting worse. He has not done any stretches. He has not used any heat or ice to the area. He states that over-the-counter pain relievers do not really seem to help. He states that the only thing that is helped previously was opioids.   He is interested in seeing pain management for this. He does have previous MRI done. It did show disc narrowing as well. No loss of bowel or bladder function. No saddle anesthesia. He has no other concerns at this time. Review of Systems   Constitutional:  Negative for chills, fatigue, fever and unexpected weight change. HENT:  Negative for congestion, rhinorrhea and sore throat. Eyes:  Negative for discharge. Respiratory:  Negative for cough, shortness of breath and wheezing. Cardiovascular:  Negative for chest pain, palpitations and leg swelling. Gastrointestinal:  Negative for abdominal pain, diarrhea, nausea and vomiting. Genitourinary:  Negative for difficulty urinating. Musculoskeletal:  Positive for back pain. Negative for arthralgias and joint swelling. Skin:  Negative for rash. Neurological:  Negative for weakness, light-headedness, numbness and headaches. Psychiatric/Behavioral:  Negative for confusion and suicidal ideas. The patient is not nervous/anxious. Past Medical History:   Diagnosis Date    Anemia     Anxiety, generalized     BPH (benign prostatic hyperplasia)     CRF (chronic renal failure)     stage 3 -   5/24/17 patient denies    Depression     Has a tremor     HIGH CHOLESTEROL     Hypothyroid     IBD (inflammatory bowel disease)     Insomnia     Kidney stones     Seasonal allergic conjunctivitis     Tinnitus     chronic      Past Surgical History:   Procedure Laterality Date    CATARACT REMOVAL Bilateral     COLONOSCOPY      KNEE SURGERY      Bilat- knee,legs    [place] COLON CA SCRN NOT  [address] [facility] N/A 05/24/2017    COLONOSCOPY performed by Brandon Sylvester MD at [address] [facility] [place] shoulder repair    THYROID SURGERY  1999     Current Outpatient Medications   Medication Sig Dispense Refill    methylPREDNISolone (MEDROL DOSEPACK) 4 MG tablet Take by mouth.  21 tablet 0    EUTHYROX 88 MCG tablet Take 1 tablet by mouth once daily 90 tablet 0    busPIRone (BUSPAR) 10 MG tablet Take 10 mg by mouth 3 times daily      hydrocortisone 2.5 % cream Apply topically 2 times daily. 453 g 0    atorvastatin (LIPITOR) 40 MG tablet TAKE ONE TABLET BY MOUTH NIGHTLY 90 tablet 3    primidone (MYSOLINE) 50 MG tablet Take 3 tablets by mouth nightly TAKE 3 TABLETS NIGHTLY 270 tablet 3    Multiple Vitamins-Minerals (MENS 50+ MULTI VITAMIN/MIN PO) Take 1 tablet by mouth daily      traZODone (DESYREL) 100 MG tablet Take 1 tablet by mouth nightly as needed for Sleep (Patient not taking: No sig reported) 90 tablet 1     No current facility-administered medications for this visit. PMH, Surgical Hx, Family Hx, and Social Hx reviewed and updated. Health Maintenance reviewed. Objective    Vitals:    09/07/22 1313   BP: 100/68   Pulse: 82   Temp: 97.1 °F (36.2 °C)   SpO2: 95%   Weight: 192 lb 6.4 oz (87.3 kg)   Height: 6' (1.829 m)       Physical Exam  Vitals reviewed. Constitutional:       General: He is not in acute distress. HENT:      Head: Normocephalic and atraumatic. Eyes:      Conjunctiva/sclera: Conjunctivae normal.   Neck:      Thyroid: No thyromegaly or thyroid tenderness. Cardiovascular:      Rate and Rhythm: Normal rate and regular rhythm. Heart sounds: No murmur heard. No friction rub. No gallop. Pulmonary:      Effort: Pulmonary effort is normal.      Breath sounds: Normal breath sounds. No wheezing, rhonchi or rales. Musculoskeletal:         General: No swelling or deformity. Cervical back: Normal range of motion and neck supple. Right lower leg: No edema. Left lower leg: No edema. Comments: Decreased range of motion on flexion extension of lumbar spine. Strength intact. Negative straight leg raise bilaterally. Lymphadenopathy:      Cervical: No cervical adenopathy. Skin:     General: Skin is warm and dry. Findings: No rash. Neurological:      General: No focal deficit present. Mental Status: He is alert.       Gait: Gait is intact. Psychiatric:         Mood and Affect: Mood normal.         Behavior: Behavior normal.          Assessment & Plan       1. Hypothyroidism due to acquired atrophy of thyroid  Stable, chronic. Continue levothyroxine 88 mcg daily. No refills needed. Continue to monitor. Follow-up in 6 months. 2. Arthritis of lumbar spine  Patient with significant arthritis and spinal stenosis of lumbar spine. He is interested in seeing pain management. Referral placed. Follow-up as scheduled. - Ambulatory referral to Pain Medicine    3. Acute right-sided low back pain with right-sided sciatica  Patient with continued low back pain with right-sided sciatica. He does have recent imaging. Will place referral to pain management. He has done physical therapy in the past.  We will provide Medrol Dosepak for pain control at this time. Continue to monitor. Follow-up as scheduled. - Ambulatory referral to Pain Medicine  - methylPREDNISolone (MEDROL DOSEPACK) 4 MG tablet; Take by mouth. Dispense: 21 tablet; Refill: 0    4. Stage 3 chronic kidney disease, unspecified whether stage 3a or 3b CKD (HCC)  Stable, chronic. Continue to follow with nephrologist.    5. Hypercholesterolemia  Stable, chronic. Continue atorvastatin 40 mg daily. 6. Anxiety  Stable, chronic. Continue buspirone 10 mg. No refills needed    7. Dysthymia  Stable, chronic. Orders Placed This Encounter   Procedures    Ambulatory referral to Pain Medicine     Referral Priority:   Routine     Referral Type:   Eval and Treat     Referral Reason:   Specialty Services Required     Referred to Provider:   Farhana Cortes DO     Number of Visits Requested:   1       Orders Placed This Encounter   Medications    methylPREDNISolone (MEDROL DOSEPACK) 4 MG tablet     Sig: Take by mouth.      Dispense:  21 tablet     Refill:  0       Medications Discontinued During This Encounter   Medication Reason    ibuprofen (ADVIL;MOTRIN) 800 MG tablet Therapy completed     Return in about 2 months (around 11/7/2022) for follow up. Reviewed with the patient: current clinical status,medications, activities and diet. Side effects, adverse effects of themedication prescribed today, as well as treatment plan/ rationale and result expectations have been discussed with the patient who expresses understanding and desires to proceed. Close follow up to evaluate treatment results and for coordination of care. I have reviewed the patient's medical history in detail and updated the computerized patient record. Please note, this report has been partially produced using speech recognition software and may cause  and /or contain errors related to that system including grammar, punctuation and spelling as well as words and phrases that may seem inappropriate. If there are questions or concerns please feel free to contact me to clarify.     Willow Gusman, DO

## 2022-09-08 ASSESSMENT — ENCOUNTER SYMPTOMS
SORE THROAT: 0
ABDOMINAL PAIN: 0
COUGH: 0
BACK PAIN: 1
VOMITING: 0
WHEEZING: 0
EYE DISCHARGE: 0
DIARRHEA: 0
NAUSEA: 0
RHINORRHEA: 0
SHORTNESS OF BREATH: 0

## 2022-09-23 ENCOUNTER — OFFICE VISIT (OUTPATIENT)
Dept: FAMILY MEDICINE CLINIC | Age: 72
End: 2022-09-23
Payer: MEDICARE

## 2022-09-23 VITALS
TEMPERATURE: 97.6 F | BODY MASS INDEX: 26.01 KG/M2 | SYSTOLIC BLOOD PRESSURE: 120 MMHG | RESPIRATION RATE: 16 BRPM | OXYGEN SATURATION: 99 % | HEIGHT: 72 IN | DIASTOLIC BLOOD PRESSURE: 70 MMHG | HEART RATE: 65 BPM | WEIGHT: 192 LBS

## 2022-09-23 DIAGNOSIS — H10.32 ACUTE BACTERIAL CONJUNCTIVITIS OF LEFT EYE: Primary | ICD-10-CM

## 2022-09-23 PROCEDURE — 99213 OFFICE O/P EST LOW 20 MIN: CPT | Performed by: NURSE PRACTITIONER

## 2022-09-23 PROCEDURE — 3017F COLORECTAL CA SCREEN DOC REV: CPT | Performed by: NURSE PRACTITIONER

## 2022-09-23 PROCEDURE — G8427 DOCREV CUR MEDS BY ELIG CLIN: HCPCS | Performed by: NURSE PRACTITIONER

## 2022-09-23 PROCEDURE — G8417 CALC BMI ABV UP PARAM F/U: HCPCS | Performed by: NURSE PRACTITIONER

## 2022-09-23 PROCEDURE — 1036F TOBACCO NON-USER: CPT | Performed by: NURSE PRACTITIONER

## 2022-09-23 PROCEDURE — 1123F ACP DISCUSS/DSCN MKR DOCD: CPT | Performed by: NURSE PRACTITIONER

## 2022-09-23 RX ORDER — OFLOXACIN 3 MG/ML
1-2 SOLUTION/ DROPS OPHTHALMIC 4 TIMES DAILY
Qty: 10 ML | Refills: 0 | Status: SHIPPED | OUTPATIENT
Start: 2022-09-23 | End: 2022-09-30

## 2022-09-23 RX ORDER — AMOXICILLIN AND CLAVULANATE POTASSIUM 875; 125 MG/1; MG/1
1 TABLET, FILM COATED ORAL 2 TIMES DAILY
Qty: 20 TABLET | Refills: 0 | Status: SHIPPED | OUTPATIENT
Start: 2022-09-23 | End: 2022-10-03

## 2022-09-23 ASSESSMENT — VISUAL ACUITY
OD_CC: 20/25
OS_CC: 20/70

## 2022-09-23 NOTE — PROGRESS NOTES
Subjective:      Patient ID: Rachael Dill is a 74 y.o. male who presents today for:  Chief Complaint   Patient presents with    Eye Problem     L eye red, swollen and watering- thinks it is pink eye sx started yesterday       HPI    This started yesterday   Felt like it was on fire   Then some discharge   Hes had trouble with his left eye lens chronically since he had cataracts done                 Past Medical History:   Diagnosis Date    Anemia     Anxiety, generalized     BPH (benign prostatic hyperplasia)     CRF (chronic renal failure)     stage 3 -   17 patient denies    Depression     Has a tremor     HIGH CHOLESTEROL     Hypothyroid     IBD (inflammatory bowel disease)     Insomnia     Kidney stones     Seasonal allergic conjunctivitis     Tinnitus     chronic      Past Surgical History:   Procedure Laterality Date    CATARACT REMOVAL Bilateral     COLONOSCOPY      KNEE SURGERY      Bilat- knee,legs    [place] COLON [place] SCRN NOT  [address] [facility] N/A 2017    COLONOSCOPY performed by Birdie Perez MD at [address] [facility] shoulder repair    THYROID SURGERY       Social History     Socioeconomic History    Marital status:      Spouse name: [employer]    Number of children: Not on file    Years of education: Not on file    Highest education level: Not on file   Occupational History    Not on file   Tobacco Use    Smoking status: Former     Packs/day: 0.50     Years: 30.00     Pack years: 15.00     Types: Pipe, Cigarettes     Quit date: 2002     Years since quittin.7    Smokeless tobacco: Never   Vaping Use    Vaping Use: Never used   Substance and Sexual Activity    Alcohol use: Yes     Comment: Occasional    Drug use: No    Sexual activity: Yes     Partners: Female   Other Topics Concern    Not on file   Social History Narrative    Not on file     Social Determinants of Health     Financial Resource Strain: Low Risk     Difficulty of Paying Living Expenses: Not hard at all   Food Insecurity: No Food Insecurity    Worried About [facility] in the Last Year: Never true    Ran Out of Food in the Last Year: Never true   Transportation Needs: No Transportation Needs    Lack of Transportation (Medical): No    Lack of Transportation (Non-Medical): No   Physical Activity: Sufficiently Active    Days of Exercise per Week: 3 days    Minutes of Exercise per Session: 150+ min   Stress: Not on file   Social Connections: Not on file   Intimate Partner Violence: Not on file   Housing Stability: Not on file     Family History   Adopted: Yes     Allergies   Allergen Reactions    Food Other (See Comments)     Burning sensation in stomach    Lactose Intolerance (Gi) Other (See Comments)     Sour stomach    Nsaids      Decreased kidney function     Current Outpatient Medications   Medication Sig Dispense Refill    ofloxacin (OCUFLOX) 0.3 % solution Place 1-2 drops into the left eye 4 times daily for 7 days 10 mL 0    amoxicillin-clavulanate (AUGMENTIN) 875-125 MG per tablet Take 1 tablet by mouth 2 times daily for 10 days 20 tablet 0    EUTHYROX 88 MCG tablet Take 1 tablet by mouth once daily 90 tablet 0    busPIRone (BUSPAR) 10 MG tablet Take 10 mg by mouth 3 times daily      hydrocortisone 2.5 % cream Apply topically 2 times daily. 453 g 0    atorvastatin (LIPITOR) 40 MG tablet TAKE ONE TABLET BY MOUTH NIGHTLY 90 tablet 3    primidone (MYSOLINE) 50 MG tablet Take 3 tablets by mouth nightly TAKE 3 TABLETS NIGHTLY 270 tablet 3    traZODone (DESYREL) 100 MG tablet Take 1 tablet by mouth nightly as needed for Sleep 90 tablet 1    Multiple Vitamins-Minerals (MENS 50+ MULTI VITAMIN/MIN PO) Take 1 tablet by mouth daily       No current facility-administered medications for this visit. Review of Systems   Constitutional:  Negative for chills, fatigue and fever. HENT:  Negative for congestion, rhinorrhea, sore throat and trouble swallowing.     Eyes:  Positive for pain, discharge and redness. Negative for visual disturbance. Respiratory:  Negative for cough, shortness of breath and wheezing. Gastrointestinal:  Negative for diarrhea, nausea and vomiting. Musculoskeletal:  Negative for myalgias. Skin:  Negative for rash. Neurological:  Negative for dizziness, light-headedness and headaches. Psychiatric/Behavioral:  Negative for agitation, confusion and hallucinations. Objective:   /70 (Site: Right Upper Arm, Position: Sitting, Cuff Size: Medium Adult)   Pulse 65   Temp 97.6 °F (36.4 °C) (Temporal)   Resp 16   Ht 6' (1.829 m)   Wt 192 lb (87.1 kg)   SpO2 99%   BMI 26.04 kg/m²     Physical Exam  Vitals reviewed. Constitutional:       Appearance: Normal appearance. HENT:      Head: Normocephalic and atraumatic. Nose: Nose normal.      Mouth/Throat:      Lips: Pink. Mouth: Mucous membranes are moist.   Eyes:      General: Lids are normal.      Conjunctiva/sclera:      Left eye: Left conjunctiva is injected. Exudate present. No hemorrhage. Comments: Eye is very red and lower inner eyelid is red   The upper eyelid is slightly swollen      Cardiovascular:      Rate and Rhythm: Normal rate. Pulmonary:      Effort: Pulmonary effort is normal.   Abdominal:      Tenderness: There is no guarding. Musculoskeletal:         General: Normal range of motion. Cervical back: Normal range of motion. Skin:     General: Skin is warm and dry. Neurological:      General: No focal deficit present. Mental Status: He is alert and oriented to person, place, and time. Psychiatric:         Mood and Affect: Mood normal.         Behavior: Behavior normal. Behavior is cooperative. Assessment:       Diagnosis Orders   1.  Acute bacterial conjunctivitis of left eye  82101 - [place] VISUAL SCREENING TEST, BILAT    ofloxacin (OCUFLOX) 0.3 % solution    amoxicillin-clavulanate (AUGMENTIN) 875-125 MG per tablet        No results found for this visit on 09/23/22. Plan:   Warm compresses, wash with baby shampoo. Start with eye drops, if does not start improving in 3-4 days also start oral antibiotic or if facial skin turns red start oral antibiotic. If does not get better get in with eye doctor. Assessment & Plan   Tian Joel was seen today for eye problem. Diagnoses and all orders for this visit:    Acute bacterial conjunctivitis of left eye  -     67630 - [place] VISUAL SCREENING TEST, BILAT  -     ofloxacin (OCUFLOX) 0.3 % solution; Place 1-2 drops into the left eye 4 times daily for 7 days  -     amoxicillin-clavulanate (AUGMENTIN) 875-125 MG per tablet; Take 1 tablet by mouth 2 times daily for 10 days    Orders Placed This Encounter   Procedures    64275 - [place] VISUAL SCREENING TEST, BILAT     Orders Placed This Encounter   Medications    ofloxacin (OCUFLOX) 0.3 % solution     Sig: Place 1-2 drops into the left eye 4 times daily for 7 days     Dispense:  10 mL     Refill:  0    amoxicillin-clavulanate (AUGMENTIN) 875-125 MG per tablet     Sig: Take 1 tablet by mouth 2 times daily for 10 days     Dispense:  20 tablet     Refill:  0       There are no discontinued medications. Return if symptoms worsen or fail to improve. Reviewed with the patient/family: current clinical status & medications. Side effects of the medication prescribed today, as well as treatment plan/rationale and result expectations have been discussed with the patient/family who expresses understanding. Patient will be discharged home in stable condition. Follow up with PCP to evaluate treatment results or return if symptoms worsen or fail to improve. Discussed signs and symptoms which require immediate follow-up in ED/call to 911. Understanding verbalized. I have reviewed the patient's medical history in detail and updated the computerized patient record.     Bere Franklin, ARMINDA - CNP

## 2022-09-27 ASSESSMENT — ENCOUNTER SYMPTOMS
EYE DISCHARGE: 1
VOMITING: 0
NAUSEA: 0
SORE THROAT: 0
EYE PAIN: 1
WHEEZING: 0
DIARRHEA: 0
SHORTNESS OF BREATH: 0
EYE REDNESS: 1
RHINORRHEA: 0
TROUBLE SWALLOWING: 0
COUGH: 0

## 2022-10-04 ENCOUNTER — INITIAL CONSULT (OUTPATIENT)
Dept: PAIN MANAGEMENT | Age: 72
End: 2022-10-04
Payer: MEDICARE

## 2022-10-04 VITALS
SYSTOLIC BLOOD PRESSURE: 116 MMHG | DIASTOLIC BLOOD PRESSURE: 64 MMHG | HEIGHT: 72 IN | OXYGEN SATURATION: 92 % | WEIGHT: 209 LBS | BODY MASS INDEX: 28.31 KG/M2 | TEMPERATURE: 97 F | HEART RATE: 88 BPM

## 2022-10-04 DIAGNOSIS — M47.817 LUMBOSACRAL SPONDYLOSIS WITHOUT MYELOPATHY: Primary | ICD-10-CM

## 2022-10-04 PROCEDURE — 1036F TOBACCO NON-USER: CPT | Performed by: PAIN MEDICINE

## 2022-10-04 PROCEDURE — G8417 CALC BMI ABV UP PARAM F/U: HCPCS | Performed by: PAIN MEDICINE

## 2022-10-04 PROCEDURE — 1123F ACP DISCUSS/DSCN MKR DOCD: CPT | Performed by: PAIN MEDICINE

## 2022-10-04 PROCEDURE — G8427 DOCREV CUR MEDS BY ELIG CLIN: HCPCS | Performed by: PAIN MEDICINE

## 2022-10-04 PROCEDURE — 3017F COLORECTAL CA SCREEN DOC REV: CPT | Performed by: PAIN MEDICINE

## 2022-10-04 PROCEDURE — G8484 FLU IMMUNIZE NO ADMIN: HCPCS | Performed by: PAIN MEDICINE

## 2022-10-04 PROCEDURE — 99213 OFFICE O/P EST LOW 20 MIN: CPT | Performed by: PAIN MEDICINE

## 2022-10-04 ASSESSMENT — ENCOUNTER SYMPTOMS
GASTROINTESTINAL NEGATIVE: 1
EYES NEGATIVE: 1
ALLERGIC/IMMUNOLOGIC NEGATIVE: 1
RESPIRATORY NEGATIVE: 1

## 2022-10-04 NOTE — PROGRESS NOTES
History of Present Illness     Patient Identification  Parul Reardon is a 74 y.o. male. Patient information was obtained from patient and spouse/SO. Chief Complaint   Chief Complaint   Patient presents with    Back Pain       Patient presents with complaint of bilateral back pain. This is a result of no known injury. Onset of pain was 2 years ago and has been gradually worsening since. The pain is located in diffuse lower back, described as sharp and rated as moderate, severe, and 9 / 10, Occational radiation. Symptoms include no other symptoms. The patient denies weakness, numbness, incontinence. The patient denies other injuries. Care prior to arrival consisted of TFESI when one of his nerves got hit and had pain for 2 months with minimal relief. Past Medical History:   Diagnosis Date    Anemia     Anxiety, generalized     BPH (benign prostatic hyperplasia)     CRF (chronic renal failure)     stage 3 -   5/24/17 patient denies    Depression     Has a tremor     HIGH CHOLESTEROL     Hypothyroid     IBD (inflammatory bowel disease)     Insomnia     Kidney stones     Seasonal allergic conjunctivitis     Tinnitus     chronic      Family History   Adopted: Yes     Current Outpatient Medications   Medication Sig Dispense Refill    busPIRone (BUSPAR) 10 MG tablet Take 10 mg by mouth 3 times daily      hydrocortisone 2.5 % cream Apply topically 2 times daily.  453 g 0    atorvastatin (LIPITOR) 40 MG tablet TAKE ONE TABLET BY MOUTH NIGHTLY 90 tablet 3    primidone (MYSOLINE) 50 MG tablet Take 3 tablets by mouth nightly TAKE 3 TABLETS NIGHTLY 270 tablet 3    Multiple Vitamins-Minerals (MENS 50+ MULTI VITAMIN/MIN PO) Take 1 tablet by mouth daily      EUTHYROX 88 MCG tablet Take 1 tablet by mouth once daily 90 tablet 0    traZODone (DESYREL) 100 MG tablet Take 1 tablet by mouth nightly as needed for Sleep (Patient not taking: Reported on 10/4/2022) 90 tablet 1     No current facility-administered

## 2022-10-05 ENCOUNTER — HOSPITAL ENCOUNTER (OUTPATIENT)
Dept: GENERAL RADIOLOGY | Age: 72
Discharge: HOME | End: 2022-10-07
Payer: MEDICARE

## 2022-10-05 ENCOUNTER — OFFICE VISIT (OUTPATIENT)
Dept: NEUROLOGY | Age: 72
End: 2022-10-05
Payer: MEDICARE

## 2022-10-05 VITALS
SYSTOLIC BLOOD PRESSURE: 102 MMHG | DIASTOLIC BLOOD PRESSURE: 62 MMHG | OXYGEN SATURATION: 96 % | WEIGHT: 195.85 LBS | HEART RATE: 85 BPM | BODY MASS INDEX: 26.56 KG/M2

## 2022-10-05 DIAGNOSIS — M47.817 LUMBOSACRAL SPONDYLOSIS WITHOUT MYELOPATHY: ICD-10-CM

## 2022-10-05 DIAGNOSIS — M48.062 SPINAL STENOSIS OF LUMBAR REGION WITH NEUROGENIC CLAUDICATION: ICD-10-CM

## 2022-10-05 DIAGNOSIS — R25.1 TREMORS OF NERVOUS SYSTEM: Primary | ICD-10-CM

## 2022-10-05 DIAGNOSIS — F41.1 ANXIETY, GENERALIZED: ICD-10-CM

## 2022-10-05 PROCEDURE — 1036F TOBACCO NON-USER: CPT | Performed by: PSYCHIATRY & NEUROLOGY

## 2022-10-05 PROCEDURE — G8428 CUR MEDS NOT DOCUMENT: HCPCS | Performed by: PSYCHIATRY & NEUROLOGY

## 2022-10-05 PROCEDURE — 99213 OFFICE O/P EST LOW 20 MIN: CPT | Performed by: PSYCHIATRY & NEUROLOGY

## 2022-10-05 PROCEDURE — 1123F ACP DISCUSS/DSCN MKR DOCD: CPT | Performed by: PSYCHIATRY & NEUROLOGY

## 2022-10-05 PROCEDURE — G8484 FLU IMMUNIZE NO ADMIN: HCPCS | Performed by: PSYCHIATRY & NEUROLOGY

## 2022-10-05 PROCEDURE — 3017F COLORECTAL CA SCREEN DOC REV: CPT | Performed by: PSYCHIATRY & NEUROLOGY

## 2022-10-05 PROCEDURE — 72100 X-RAY EXAM L-S SPINE 2/3 VWS: CPT

## 2022-10-05 PROCEDURE — G8417 CALC BMI ABV UP PARAM F/U: HCPCS | Performed by: PSYCHIATRY & NEUROLOGY

## 2022-10-05 RX ORDER — PRIMIDONE 250 MG/1
250 TABLET ORAL NIGHTLY
Qty: 90 TABLET | Refills: 3 | Status: SHIPPED | OUTPATIENT
Start: 2022-10-05

## 2022-10-05 RX ORDER — BUSPIRONE HYDROCHLORIDE 10 MG/1
10 TABLET ORAL 3 TIMES DAILY
Qty: 270 TABLET | Refills: 1 | Status: SHIPPED | OUTPATIENT
Start: 2022-10-05

## 2022-10-05 ASSESSMENT — ENCOUNTER SYMPTOMS
TROUBLE SWALLOWING: 0
PHOTOPHOBIA: 0
BACK PAIN: 1
SHORTNESS OF BREATH: 0
NAUSEA: 0
COLOR CHANGE: 0
CHOKING: 0
VOMITING: 0

## 2022-10-05 NOTE — PROGRESS NOTES
Subjective:      Patient ID: Harman Bowman is a 74 y.o. male who presents today for:  Chief Complaint   Patient presents with    Tremors     6 month f/u not as bad but the tremors are still there. HPI 74 right-handed gentleman now with a history of chills tremors which have responded very well to Mysoline. Patient is on 150 mg and tolerating this quite well. She has not any side effects of the same. He does not have Parkinson's disease. His back issues and has have extensive elation of the same including MRI of the thoracic and lumbar spine which have not shown anything significant. Patient continues on Zanaflex  Just had an appointment with the pain management as well they are planning some injections. Patient not developed any parkinsonian features.     Past Medical History:   Diagnosis Date    Anemia     Anxiety, generalized     BPH (benign prostatic hyperplasia)     CRF (chronic renal failure)     stage 3 -   17 patient denies    Depression     Has a tremor     HIGH CHOLESTEROL     Hypothyroid     IBD (inflammatory bowel disease)     Insomnia     Kidney stones     Seasonal allergic conjunctivitis     Tinnitus     chronic      Past Surgical History:   Procedure Laterality Date    CATARACT REMOVAL Bilateral     COLONOSCOPY      KNEE SURGERY      Bilat- knee,legs    [place] COLON [place] SCRN NOT  [address] [facility] N/A 2017    COLONOSCOPY performed by Lauren Cosme MD at [address] [facility] shoulder repair    THYROID SURGERY       Social History     Socioeconomic History    Marital status:      Spouse name: Evan [place]    Number of children: Not on file    Years of education: Not on file    Highest education level: Not on file   Occupational History    Not on file   Tobacco Use    Smoking status: Former     Packs/day: 0.50     Years: 30.00     Pack years: 15.00     Types: Pipe, Cigarettes     Quit date: 2002     Years since quittin.7    Smokeless tobacco: Never   Vaping Use    Vaping Use: Never used   Substance and Sexual Activity    Alcohol use: Yes     Comment: Occasional    Drug use: No    Sexual activity: Yes     Partners: Female   Other Topics Concern    Not on file   Social History Narrative    Not on file     Social Determinants of Health     Financial Resource Strain: Low Risk     Difficulty of Paying Living Expenses: Not hard at all   Food Insecurity: No Food Insecurity    Worried About [address] [employer] in the Last Year: Never true    [facility] in the Last Year: Never true   Transportation Needs: No Transportation Needs    Lack of Transportation (Medical): No    Lack of Transportation (Non-Medical): No   Physical Activity: Sufficiently Active    Days of Exercise per Week: 3 days    Minutes of Exercise per Session: 150+ min   Stress: Not on file   Social Connections: Not on file   Intimate Partner Violence: Not on file   Housing Stability: Not on file     Family History   Adopted: Yes     Allergies   Allergen Reactions    Food Other (See Comments)     Burning sensation in stomach    Lactose Intolerance (Gi) Other (See Comments)     Sour stomach    Nsaids      Decreased kidney function       Current Outpatient Medications   Medication Sig Dispense Refill    busPIRone (BUSPAR) 10 MG tablet Take 1 tablet by mouth 3 times daily 270 tablet 1    primidone (MYSOLINE) 250 MG tablet Take 1 tablet by mouth nightly 90 tablet 3    EUTHYROX 88 MCG tablet Take 1 tablet by mouth once daily 90 tablet 0    hydrocortisone 2.5 % cream Apply topically 2 times daily. 453 g 0    atorvastatin (LIPITOR) 40 MG tablet TAKE ONE TABLET BY MOUTH NIGHTLY 90 tablet 3    Multiple Vitamins-Minerals (MENS 50+ MULTI VITAMIN/MIN PO) Take 1 tablet by mouth daily      traZODone (DESYREL) 100 MG tablet Take 1 tablet by mouth nightly as needed for Sleep 90 tablet 1     No current facility-administered medications for this visit.          Review of Systems   Constitutional: Negative for fever. HENT:  Negative for ear pain, tinnitus and trouble swallowing. Eyes:  Negative for photophobia and visual disturbance. Respiratory:  Negative for choking and shortness of breath. Cardiovascular:  Negative for chest pain and palpitations. Gastrointestinal:  Negative for nausea and vomiting. Musculoskeletal:  Positive for back pain. Negative for gait problem, joint swelling, myalgias, neck pain and neck stiffness. Skin:  Negative for color change. Allergic/Immunologic: Negative for food allergies. Neurological:  Positive for tremors. Negative for dizziness, seizures, syncope, facial asymmetry, speech difficulty, weakness, light-headedness, numbness and headaches. Psychiatric/Behavioral:  Negative for behavioral problems, confusion, hallucinations and sleep disturbance. Objective:   /62 (Site: Left Upper Arm, Position: Sitting, Cuff Size: Medium Adult)   Pulse 85   Wt 195 lb 13.6 oz (88.8 kg)   SpO2 96%   BMI 26.56 kg/m²     Physical Exam  Vitals reviewed. Eyes:      Pupils: Pupils are equal, round, and reactive to light. Cardiovascular:      Rate and Rhythm: Normal rate and regular rhythm. Heart sounds: No murmur heard. Pulmonary:      Effort: Pulmonary effort is normal.      Breath sounds: Normal breath sounds. Abdominal:      General: Bowel sounds are normal.   Musculoskeletal:         General: Normal range of motion. Cervical back: Normal range of motion. Skin:     General: Skin is warm. Neurological:      Mental Status: He is alert and oriented to person, place, and time. Cranial Nerves: No cranial nerve deficit. Sensory: No sensory deficit. Motor: No abnormal muscle tone. Coordination: Coordination normal.      Deep Tendon Reflexes: Reflexes are normal and symmetric. Babinski sign absent on the right side. Babinski sign absent on the left side.    Psychiatric:         Mood and Affect: Mood normal.       MRI THORACIC SPINE WO CONTRAST    Result Date: 4/8/2021  EXAMINATION: MRI THORACIC SPINE WO CONTRAST DATE AND TIME:4/7/2021 6:23 PM CLINICAL HISTORY: Severe thoracic spine pain. S32.010A Closed wedge compression fracture of L1 vertebra, initial encounter ([facility] [address] ICD10 COMPARISON: If available previous films were reviewed for comparison. Technique: Multiplanar multisequence MRI scans of the thoracic spine. Findings:     Bones: The thoracic vertebrae are normally aligned with no evidence of fracture. Vertebral body hemangiomas involving T5 and T9. Otherwise, there is normal marrow signal throughout the thoracic spine. Disc space: There is no focal disc herniation or evidence for spinal canal stenosis. Disc spaces are age-appropriate. Spinal cord: The thoracic cord is normal in configuration and signal intensity. Soft tissues: There is no paraspinal soft tissue mass or fluid collection.      NEGATIVE MRI OF THE THORACIC SPINE      Lab Results   Component Value Date/Time    WBC 5.7 07/27/2020 03:20 PM    RBC 4.87 07/27/2020 03:20 PM    HGB 16.2 07/27/2020 03:20 PM    HCT 47.3 07/27/2020 03:20 PM    MCV 97.2 07/27/2020 03:20 PM    MCH 33.3 07/27/2020 03:20 PM    MCHC 34.2 07/27/2020 03:20 PM    RDW 12.7 07/27/2020 03:20 PM     07/27/2020 03:20 PM    MPV 9.8 05/05/2014 02:00 PM     Lab Results   Component Value Date/Time     08/08/2022 02:50 PM    K 4.5 08/08/2022 02:50 PM     08/08/2022 02:50 PM    CO2 25 08/08/2022 02:50 PM    BUN 11 08/08/2022 02:50 PM    CREATININE 1.32 08/08/2022 02:50 PM    GFRAA >60.0 08/08/2022 02:50 PM    LABGLOM 53.0 08/08/2022 02:50 PM    GLUCOSE 78 08/08/2022 02:50 PM    PROT 6.6 07/14/2022 12:07 PM    LABALBU 4.4 08/08/2022 02:50 PM    CALCIUM 9.5 08/08/2022 02:50 PM    BILITOT 0.6 07/14/2022 12:07 PM    ALKPHOS 109 07/14/2022 12:07 PM    AST 20 07/14/2022 12:07 PM    ALT 19 07/14/2022 12:07 PM     Lab Results   Component Value Date/Time    PROTIME 10.7 05/05/2014 02:00 PM INR 1.0 05/05/2014 02:00 PM     Lab Results   Component Value Date/Time    TSH 0.870 03/04/2021 01:53 PM    FQABWBBZ18 700 03/04/2021 01:53 PM    FOLATE >20.0 03/04/2021 01:53 PM     Lab Results   Component Value Date/Time    TRIG 102 09/24/2019 02:55 PM    HDL 38 07/14/2022 12:07 PM    LDLCALC 88 07/14/2022 12:07 PM     No results found for: Jeppie Quill, LABBENZ, CANNAB, COCAINESCRN, LABMETH, OPIATESCREENURINE, PHENCYCLIDINESCREENURINE, PPXUR, ETOH  No results found for: LITHIUM, DILFRTOT, VALPROATE    Assessment:       Diagnosis Orders   1. Tremors of nervous system        2. Spinal stenosis of lumbar region with neurogenic claudication        3. Anxiety, generalized        Essential tremors with some response to Mysoline. Patient is on 150 mg daily. Is not any side effects of sedation. His main issues appears to be his back. Further obtain MRI of the thoracic and lumbosacral spine did not show anything patient  Patient is going to see pain management. I recommend that we increase his Mysoline to 250 mg as this will be only 1 pill at night and he has not any side effects and behaviors tremors more has not dropped any parkinsonian features. We will see him in 6 months and earlier if there are any concerns   Plan:      No orders of the defined types were placed in this encounter. Orders Placed This Encounter   Medications    busPIRone (BUSPAR) 10 MG tablet     Sig: Take 1 tablet by mouth 3 times daily     Dispense:  270 tablet     Refill:  1    primidone (MYSOLINE) 250 MG tablet     Sig: Take 1 tablet by mouth nightly     Dispense:  90 tablet     Refill:  3       No follow-ups on file.       Bhupinder Torrez MD

## 2022-10-12 ENCOUNTER — TELEPHONE (OUTPATIENT)
Dept: PAIN MANAGEMENT | Age: 72
End: 2022-10-12

## 2022-10-14 ENCOUNTER — TELEPHONE (OUTPATIENT)
Dept: PAIN MANAGEMENT | Age: 72
End: 2022-10-14

## 2022-10-14 NOTE — TELEPHONE ENCOUNTER
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 He says that no one explained this to him when he was in last to see Dr. Ariel Francis. He is asking if someone can call him back to discuss.

## 2022-10-20 ENCOUNTER — PROCEDURE VISIT (OUTPATIENT)
Dept: PAIN MANAGEMENT | Age: 72
End: 2022-10-20
Payer: MEDICARE

## 2022-10-20 DIAGNOSIS — M47.817 LUMBOSACRAL SPONDYLOSIS WITHOUT MYELOPATHY: ICD-10-CM

## 2022-10-20 PROCEDURE — 64494 INJ PARAVERT F JNT L/S 2 LEV: CPT | Performed by: PAIN MEDICINE

## 2022-10-20 PROCEDURE — 64493 INJ PARAVERT F JNT L/S 1 LEV: CPT | Performed by: PAIN MEDICINE

## 2022-10-20 RX ORDER — BUPIVACAINE HYDROCHLORIDE 2.5 MG/ML
30 INJECTION, SOLUTION INFILTRATION; PERINEURAL ONCE
Status: COMPLETED | OUTPATIENT
Start: 2022-10-20 | End: 2022-10-20

## 2022-10-20 RX ORDER — LIDOCAINE HYDROCHLORIDE 10 MG/ML
10 INJECTION, SOLUTION EPIDURAL; INFILTRATION; INTRACAUDAL; PERINEURAL ONCE
Status: COMPLETED | OUTPATIENT
Start: 2022-10-20 | End: 2022-10-20

## 2022-10-20 RX ADMIN — LIDOCAINE HYDROCHLORIDE 10 MG: 10 INJECTION, SOLUTION EPIDURAL; INFILTRATION; INTRACAUDAL; PERINEURAL at 12:02

## 2022-10-20 RX ADMIN — BUPIVACAINE HYDROCHLORIDE 75 MG: 2.5 INJECTION, SOLUTION INFILTRATION; PERINEURAL at 12:00

## 2022-10-24 NOTE — PROGRESS NOTES
Subjective:      Patient ID: Oralia Taylor is a 65 y.o. male who presents today for:  Chief Complaint   Patient presents with    6 Month Follow-Up     Pt states that tremors are doing good right now. Pt states that writing is iffy sometimes, he is eating okay still, and no balance problems.  Other     Pt states that he has an MRI next week for his back due to bone spurs and a few spots on back. HPI 77-year-old right-handed female with a history of tremors with anxiety. Patient is on Mysoline 100 mg with addition of buspirone. Patient has continued to have back pain she is seen by pain management we have not intervened. When last seen we had recommended MRI due to the expectation of canal stenosis MRI of the lumbar spine was reviewed from last and there appeared to be some some minor discs and not surgical at least what I see if. His tremors are well controlled. Still having back pain he is due for a thoracic MRI. He was mentioning something about the tumor in the lumbar spine I reviewed the films and do not see anything mention or seen by me.     Past Medical History:   Diagnosis Date    Anemia     Anxiety, generalized     BPH (benign prostatic hyperplasia)     CRF (chronic renal failure)     stage 3 -   5/24/17 patient denies    Depression     Has a tremor     HIGH CHOLESTEROL     Hypothyroid     IBD (inflammatory bowel disease)     Insomnia     Kidney stones     Seasonal allergic conjunctivitis     Tinnitus     chronic      Past Surgical History:   Procedure Laterality Date    COLONOSCOPY      KNEE SURGERY      Bilat- knee,legs    RI COLON CA SCRN NOT HI RSK IND N/A 5/24/2017    COLONOSCOPY performed by Noelle Dueñas MD at [facility] shoulder repair    THYROID SURGERY  1999     Social History     Socioeconomic History    Marital status:      Spouse name: Topher Bernard Number of children: Not on file    Years of education: Not on file    Highest education level: Not on file   Occupational History    Not on file   Social Needs    Financial resource strain: Not hard at all    Food insecurity     Worry: Never true     Inability: Never true   [employer] needs     Medical: No     Non-medical: No   Tobacco Use    Smoking status: Former Smoker     Packs/day: 0.50     Years: 30.00     Pack years: 15.00     Types: Pipe     Quit date: 2002     Years since quittin.2    Smokeless tobacco: Never Used   Substance and Sexual Activity    Alcohol use: Yes     Comment: Occasional    Drug use: No    Sexual activity: Yes     Partners: Female   Lifestyle    Physical activity     Days per week: Not on file     Minutes per session: Not on file    Stress: Not on file   Relationships    Social connections     Talks on phone: Not on file     Gets together: Not on file     Attends Spiritism service: Not on file     Active member of club or organization: Not on file     Attends meetings of clubs or organizations: Not on file     Relationship status: Not on file    Intimate partner violence     Fear of current or ex partner: Not on file     Emotionally abused: Not on file     Physically abused: Not on file     Forced sexual activity: Not on file   Other Topics Concern    Not on file   Social History Narrative    Not on file     Family History   Adopted:  Yes     Allergies   Allergen Reactions    Food Other (See Comments)     Burning sensation in stomach    Lactose Intolerance (Gi) Other (See Comments)     Sour stomach    Nsaids      Decreased kidney function       Current Outpatient Medications   Medication Sig Dispense Refill    busPIRone (BUSPAR) 10 MG tablet Take 1 tablet by mouth 3 times daily 90 tablet 0    traZODone (DESYREL) 100 MG tablet Take 1 tablet by mouth nightly as needed for Sleep 90 tablet 1    levothyroxine (EUTHYROX) 88 MCG tablet Take 1 tablet by mouth once daily 90 tablet 3    atorvastatin (LIPITOR) 40 MG tablet TAKE ONE TABLET BY MOUTH NIGHTLY 90 tablet 3    potassium citrate (UROCIT-K) 10 MEQ (1080 MG) extended release tablet Take 10 mEq by mouth 3 times daily (with meals)      hydrocortisone 2.5 % cream Apply topically 2 times daily. 453 g 0    Multiple Vitamins-Minerals (MENS 50+ MULTI VITAMIN/MIN PO) Take 1 tablet by mouth daily      primidone (MYSOLINE) 50 MG tablet Take 2 tablets by mouth nightly 180 tablet 3     No current facility-administered medications for this visit. Review of Systems   Constitutional: Negative for fever. HENT: Negative for ear pain, tinnitus and trouble swallowing. Eyes: Negative for photophobia and visual disturbance. Respiratory: Negative for choking and shortness of breath. Cardiovascular: Negative for chest pain and palpitations. Gastrointestinal: Negative for nausea and vomiting. Musculoskeletal: Positive for back pain. Negative for gait problem, joint swelling, myalgias, neck pain and neck stiffness. Skin: Negative for color change. Allergic/Immunologic: Negative for food allergies. Neurological: Positive for tremors. Negative for dizziness, seizures, syncope, facial asymmetry, speech difficulty, weakness, light-headedness, numbness and headaches. Psychiatric/Behavioral: Negative for behavioral problems, confusion, hallucinations and sleep disturbance. Objective:   /82 (Site: Left Upper Arm, Position: Sitting, Cuff Size: Medium Adult)   Pulse 83   Wt 201 lb 4.8 oz (91.3 kg)   BMI 27.30 kg/m²     Physical Exam  Vitals signs reviewed. Eyes:      Pupils: Pupils are equal, round, and reactive to light. Neck:      Musculoskeletal: Normal range of motion. Cardiovascular:      Rate and Rhythm: Normal rate and regular rhythm. Heart sounds: No murmur. Pulmonary:      Effort: Pulmonary effort is normal.      Breath sounds: Normal breath sounds.    Abdominal:      General: Bowel sounds are normal.   Musculoskeletal: Normal range of motion. Skin:     General: Skin is warm. Neurological:      Mental Status: He is alert and oriented to person, place, and time. Cranial Nerves: No cranial nerve deficit. Sensory: No sensory deficit. Motor: No abnormal muscle tone. Coordination: Coordination normal.      Deep Tendon Reflexes: Reflexes are normal and symmetric. Babinski sign absent on the right side. Babinski sign absent on the left side. Psychiatric:         Mood and Affect: Mood normal.       No definite tremor is noted today. Xr Thoracic Spine (3 Views)    Result Date: 3/11/2021  XR THORACIC SPINE (3 VIEWS) CLINICAL HISTORY:  M54.6 Chronic midline thoracic back pain ICD10 COMPARISON:  none FINDINGS: 5 views of the thoracic spine are submitted. There is some loss of height of lower thoracic probably T12 is associated sclerotic density within the superior aspect of T12. Minimal anterior wedging of L1. Disk spaces are intact. There is multilevel degenerative change with bridging osteophytes. No significant spondylolisthesis. Within the field-of-view there is a calcific density seen in both the right quadrant and left or quadrant. That on the right measures 1.9 left left measures 1.7 cm     THERE IS SOME LOSS OF HEIGHT OF LOWER THORACIC PROBABLY T12 WITH A SCLEROTIC AREA SUBJACENT TO THE ENDPLATE SUPERIORLY. COULD REPRESENT A BONE ISLAND BUT OTHER ETIOLOGIES INCLUDING MALIGNANCY METASTATIC DISEASE NOT EXCLUDED. CORRELATE CLINICALLY AND FOLLOW-UP. MINIMAL ANTERIOR WEDGING OF L1 CALCIFIC DENSITIES SEEN WITHIN THE FIELD-OF-VIEW IN THE RIGHT UPPER QUADRANT AND LEFT OR QUADRANT.  PREVIOUS CT IN 2015 SHOWED BILATERAL RENAL CALCULI. COULD CONSIDER FOLLOW-UP CT SCAN RENAL STONE PROTOCOL TO FURTHER EVALUATE IF CLINICALLY INDICATED     Dexa Bone Density Axial Skeleton    Result Date: 3/13/2021  FOR CHARGE PURPOSES ONLY REPORT TO FOLLOW       Lab Results   Component Value Date    WBC 5.7 07/27/2020    RBC 4.87 07/27/2020    HGB 16.2 07/27/2020    HCT 47.3 07/27/2020    MCV 97.2 07/27/2020    MCH 33.3 07/27/2020    MCHC 34.2 07/27/2020    RDW 12.7 07/27/2020     07/27/2020    MPV 9.8 05/05/2014     Lab Results   Component Value Date     03/04/2021    K 4.4 03/04/2021     03/04/2021    CO2 25 03/04/2021    BUN 17 03/04/2021    CREATININE 1.28 03/04/2021    GFRAA >60.0 03/04/2021    LABGLOM 55.1 03/04/2021    GLUCOSE 77 03/04/2021    PROT 6.7 03/04/2021    LABALBU 4.2 03/04/2021    CALCIUM 9.4 03/04/2021    BILITOT 0.6 03/04/2021    ALKPHOS 105 03/04/2021    AST 23 03/04/2021    ALT 19 03/04/2021     Lab Results   Component Value Date    PROTIME 10.7 05/05/2014    INR 1.0 05/05/2014     Lab Results   Component Value Date    TSH 0.870 03/04/2021    OLBSEAUW29 700 03/04/2021    FOLATE >20.0 03/04/2021     Lab Results   Component Value Date    TRIG 102 09/24/2019    HDL 39 03/04/2021    LDLCALC 104 03/04/2021     No results found for: Alvin Rota, LABBENZ, CANNAB, COCAINESCRN, LABMETH, OPIATESCREENURINE, PHENCYCLIDINESCREENURINE, PPXUR, ETOH  No results found for: LITHIUM, DILFRTOT, VALPROATE    Assessment:       Diagnosis Orders   1. Essential tremor     2. Spinal stenosis of lumbar region with neurogenic claudication     Essential tremor well responsive to Mysoline 50 mg 2 tablets at night with addition of BuSpar 10 mg 3 times a day. No parkinsonian tremor is notable. He does not have any other symptoms at this time. His main issues is always been back pain. When last seen had obtained an MRI of the lumbosacral spine which shows multiple foraminal stenosis but nothing surgical was discovered. He is followed by pain management. He has thoracic pain and he has a thoracic spine MRI due. He did mention to me about a tumor in the lumbar area which I reviewed and I did not find any suggestion of the same. We will await his MRI of the thoracic spine may be that there may be something that he might have misinterpreted. Quality 431: Preventive Care And Screening: Unhealthy Alcohol Use - Screening: Patient screened for unhealthy alcohol use using a single question and scores less than 2 times per year Detail Level: Detailed We will keep an eye on this. We will see him in a few months. Is not myelopathic. Plan:      No orders of the defined types were placed in this encounter. No orders of the defined types were placed in this encounter. No follow-ups on file.       Cassie Kocher, MD Quality 130: Documentation Of Current Medications In The Medical Record: Current Medications Documented Quality 226: Preventive Care And Screening: Tobacco Use: Screening And Cessation Intervention: Patient screened for tobacco use, is a smoker AND did not received Cessation Counseling for Medical Reasons Quality 111:Pneumonia Vaccination Status For Older Adults: Pneumococcal Vaccination Previously Received

## 2022-11-01 ENCOUNTER — OFFICE VISIT (OUTPATIENT)
Dept: PAIN MANAGEMENT | Age: 72
End: 2022-11-01
Payer: MEDICARE

## 2022-11-01 VITALS
BODY MASS INDEX: 26.82 KG/M2 | DIASTOLIC BLOOD PRESSURE: 68 MMHG | SYSTOLIC BLOOD PRESSURE: 122 MMHG | TEMPERATURE: 97.8 F | HEIGHT: 72 IN | WEIGHT: 198 LBS

## 2022-11-01 DIAGNOSIS — M47.817 LUMBOSACRAL SPONDYLOSIS WITHOUT MYELOPATHY: Primary | ICD-10-CM

## 2022-11-01 PROCEDURE — 1036F TOBACCO NON-USER: CPT | Performed by: PAIN MEDICINE

## 2022-11-01 PROCEDURE — 1123F ACP DISCUSS/DSCN MKR DOCD: CPT | Performed by: PAIN MEDICINE

## 2022-11-01 PROCEDURE — G8417 CALC BMI ABV UP PARAM F/U: HCPCS | Performed by: PAIN MEDICINE

## 2022-11-01 PROCEDURE — 3017F COLORECTAL CA SCREEN DOC REV: CPT | Performed by: PAIN MEDICINE

## 2022-11-01 PROCEDURE — G8427 DOCREV CUR MEDS BY ELIG CLIN: HCPCS | Performed by: PAIN MEDICINE

## 2022-11-01 PROCEDURE — 99213 OFFICE O/P EST LOW 20 MIN: CPT | Performed by: PAIN MEDICINE

## 2022-11-01 PROCEDURE — G8484 FLU IMMUNIZE NO ADMIN: HCPCS | Performed by: PAIN MEDICINE

## 2022-11-01 RX ORDER — TRAMADOL HYDROCHLORIDE 50 MG/1
50 TABLET ORAL EVERY 6 HOURS PRN
Qty: 28 TABLET | Refills: 0 | Status: SHIPPED | OUTPATIENT
Start: 2022-11-01 | End: 2022-11-08

## 2022-11-01 ASSESSMENT — ENCOUNTER SYMPTOMS
GASTROINTESTINAL NEGATIVE: 1
GASTROINTESTINAL NEGATIVE: 1
EYES NEGATIVE: 1
RESPIRATORY NEGATIVE: 1
EYES NEGATIVE: 1
ALLERGIC/IMMUNOLOGIC NEGATIVE: 1
RESPIRATORY NEGATIVE: 1
ALLERGIC/IMMUNOLOGIC NEGATIVE: 1

## 2022-11-01 NOTE — PROGRESS NOTES
History of Present Illness     Patient Identification  Jimmy Vergara is a 74 y.o. male. Patient information was obtained from patient and spouse/SO. Chief Complaint   Chief Complaint   Patient presents with    Other       Patient presents with complaint of bilateral back pain. This is a result of no known injury. Onset of pain was 2 years ago and has been gradually worsening since. The pain is located in diffuse lower back, described as sharp and rated as moderate, severe, and 9 / 10, Occational radiation. Symptoms include no other symptoms. The patient denies weakness, numbness, incontinence. The patient denies other injuries. Care prior to arrival consisted of TFESI when one of his nerves got hit and had pain for 2 months with minimal relief. Had set of Facet blocks with about 40% relief. Past Medical History:   Diagnosis Date    Anemia     Anxiety, generalized     BPH (benign prostatic hyperplasia)     CRF (chronic renal failure)     stage 3 -   5/24/17 patient denies    Depression     Has a tremor     HIGH CHOLESTEROL     Hypothyroid     IBD (inflammatory bowel disease)     Insomnia     Kidney stones     Seasonal allergic conjunctivitis     Tinnitus     chronic      Family History   Adopted: Yes     Current Outpatient Medications   Medication Sig Dispense Refill    busPIRone (BUSPAR) 10 MG tablet Take 1 tablet by mouth 3 times daily 270 tablet 1    primidone (MYSOLINE) 250 MG tablet Take 1 tablet by mouth nightly 90 tablet 3    EUTHYROX 88 MCG tablet Take 1 tablet by mouth once daily 90 tablet 0    hydrocortisone 2.5 % cream Apply topically 2 times daily.  453 g 0    atorvastatin (LIPITOR) 40 MG tablet TAKE ONE TABLET BY MOUTH NIGHTLY 90 tablet 3    traZODone (DESYREL) 100 MG tablet Take 1 tablet by mouth nightly as needed for Sleep 90 tablet 1    Multiple Vitamins-Minerals (MENS 50+ MULTI VITAMIN/MIN PO) Take 1 tablet by mouth daily       No current facility-administered medications for this visit. Allergies   Allergen Reactions    Food Other (See Comments)     Burning sensation in stomach    Lactose Intolerance (Gi) Other (See Comments)     Sour stomach    Nsaids      Decreased kidney function       Review of Systems  Review of Systems   Constitutional: Negative. HENT: Negative. Eyes: Negative. Respiratory: Negative. Cardiovascular: Negative. Gastrointestinal: Negative. Endocrine: Negative. Genitourinary: Negative. Kidney stones   Musculoskeletal: Negative. Skin: Negative. Allergic/Immunologic: Negative. Neurological: Negative. Hematological: Negative. Psychiatric/Behavioral: Negative. All other systems reviewed and are negative. Physical Exam     /68   Temp 97.8 °F (36.6 °C)   Ht 6' (1.829 m)   Wt 198 lb (89.8 kg)   BMI 26.85 kg/m²   Physical Exam  Vitals and nursing note reviewed. Constitutional:       Appearance: Normal appearance. HENT:      Head: Normocephalic. Right Ear: Ear canal normal.      Left Ear: Ear canal normal.      Nose: Nose normal.      Mouth/Throat:      Mouth: Mucous membranes are moist.   Eyes:      Extraocular Movements: Extraocular movements intact. Conjunctiva/sclera: Conjunctivae normal.      Pupils: Pupils are equal, round, and reactive to light. Cardiovascular:      Rate and Rhythm: Normal rate and regular rhythm. Pulses: Normal pulses. Heart sounds: Normal heart sounds. Pulmonary:      Effort: Pulmonary effort is normal.      Breath sounds: Normal breath sounds. Abdominal:      General: Abdomen is flat. Bowel sounds are normal.      Palpations: Abdomen is soft. Musculoskeletal:         General: Normal range of motion. Cervical back: Normal range of motion and neck supple. Comments: Good Gait, able to walk on Toes and Heels, Tender Rt sided Facets, No SI Joint Pain, No Pain on SLRs no pain on Gainslins. Skin:     General: Skin is warm.       Capillary Refill: Capillary refill takes less than 2 seconds. Neurological:      General: No focal deficit present. Mental Status: He is alert and oriented to person, place, and time. Mental status is at baseline. Comments: Mild Tremors more Rt side. Psychiatric:         Mood and Affect: Mood normal.         Behavior: Behavior normal.         Thought Content: Thought content normal.         Judgment: Judgment normal.         Plan   Had 40% Relief Rt sided pain with facet blocks 10/20/22. Will start small dose Pain meds and follow.

## 2022-11-01 NOTE — PROGRESS NOTES
History of Present Illness     Patient Identification  Monse Root is a 74 y.o. male. Patient information was obtained from patient and spouse/SO. Chief Complaint   Chief Complaint   Patient presents with    Back Pain       Patient presents for Rt lumbar facet blocks  with complaint of bilateral back pain. This is a result of no known injury. Onset of pain was 2 years ago and has been gradually worsening since. The pain is located in diffuse lower back, described as sharp and rated as moderate, severe, and 9 / 10, Occational radiation. Symptoms include no other symptoms. The patient denies weakness, numbness, incontinence. The patient denies other injuries. Care prior to arrival consisted of TFESI when one of his nerves got hit and had pain for 2 months with minimal relief. Past Medical History:   Diagnosis Date    Anemia     Anxiety, generalized     BPH (benign prostatic hyperplasia)     CRF (chronic renal failure)     stage 3 -   5/24/17 patient denies    Depression     Has a tremor     HIGH CHOLESTEROL     Hypothyroid     IBD (inflammatory bowel disease)     Insomnia     Kidney stones     Seasonal allergic conjunctivitis     Tinnitus     chronic      Family History   Adopted: Yes     Current Outpatient Medications   Medication Sig Dispense Refill    traMADol (ULTRAM) 50 MG tablet Take 1 tablet by mouth every 6 hours as needed for Pain for up to 7 days. 28 tablet 0    busPIRone (BUSPAR) 10 MG tablet Take 1 tablet by mouth 3 times daily 270 tablet 1    primidone (MYSOLINE) 250 MG tablet Take 1 tablet by mouth nightly 90 tablet 3    EUTHYROX 88 MCG tablet Take 1 tablet by mouth once daily 90 tablet 0    hydrocortisone 2.5 % cream Apply topically 2 times daily.  453 g 0    atorvastatin (LIPITOR) 40 MG tablet TAKE ONE TABLET BY MOUTH NIGHTLY 90 tablet 3    traZODone (DESYREL) 100 MG tablet Take 1 tablet by mouth nightly as needed for Sleep 90 tablet 1    Multiple Vitamins-Minerals (MENS 50+ MULTI VITAMIN/MIN PO) Take 1 tablet by mouth daily       No current facility-administered medications for this visit. Allergies   Allergen Reactions    Food Other (See Comments)     Burning sensation in stomach    Lactose Intolerance (Gi) Other (See Comments)     Sour stomach    Nsaids      Decreased kidney function       Review of Systems  Review of Systems   Constitutional: Negative. HENT: Negative. Eyes: Negative. Respiratory: Negative. Cardiovascular: Negative. Gastrointestinal: Negative. Endocrine: Negative. Genitourinary: Negative. Kidney stones   Musculoskeletal: Negative. Skin: Negative. Allergic/Immunologic: Negative. Neurological: Negative. Hematological: Negative. Psychiatric/Behavioral: Negative. All other systems reviewed and are negative. Physical Exam     There were no vitals taken for this visit. Physical Exam  Vitals and nursing note reviewed. Constitutional:       Appearance: Normal appearance. HENT:      Head: Normocephalic. Right Ear: Ear canal normal.      Left Ear: Ear canal normal.      Nose: Nose normal.      Mouth/Throat:      Mouth: Mucous membranes are moist.   Eyes:      Extraocular Movements: Extraocular movements intact. Conjunctiva/sclera: Conjunctivae normal.      Pupils: Pupils are equal, round, and reactive to light. Cardiovascular:      Rate and Rhythm: Normal rate and regular rhythm. Pulses: Normal pulses. Heart sounds: Normal heart sounds. Pulmonary:      Effort: Pulmonary effort is normal.      Breath sounds: Normal breath sounds. Abdominal:      General: Abdomen is flat. Bowel sounds are normal.      Palpations: Abdomen is soft. Musculoskeletal:         General: Normal range of motion. Cervical back: Normal range of motion and neck supple. Comments: Good Gait, able to walk on Toes and Heels, Tender Rt sided Facets, No SI Joint Pain, No Pain on SLRs no pain on Gainslins.    Skin: General: Skin is warm. Capillary Refill: Capillary refill takes less than 2 seconds. Neurological:      General: No focal deficit present. Mental Status: He is alert and oriented to person, place, and time. Mental status is at baseline. Comments: Mild Tremors more Rt side. Psychiatric:         Mood and Affect: Mood normal.         Behavior: Behavior normal.         Thought Content: Thought content normal.         Judgment: Judgment normal.         Plan     Right L4-5, 5-S1 Facet  blocks. Discussed procedure, risks and conplications. Concerns addressed, Informed consent obtained. Prone on Radha table, Rt oblique view used to yennifer skin for needle placement, skin scrubbed with Betadine, anesthetized with 2cc of 2% Lidocaine. 23g 5inch needle advanced towards junctions of superior articulating processes and transverse processes of L4 and L5 vertebrae rt side, and junction of Superior articulating processes and ala of sacrum on Rt side, injected 0.3cc of 0.5% Bupivacaine with 100% relief, pt washed off the Betadine, applied bandages and discharged home in stable condition.

## 2022-11-02 ENCOUNTER — HOSPITAL ENCOUNTER (OUTPATIENT)
Dept: PHYSICAL THERAPY | Age: 72
Setting detail: THERAPIES SERIES
Discharge: HOME | End: 2022-11-02
Payer: MEDICARE

## 2022-11-02 PROCEDURE — 97110 THERAPEUTIC EXERCISES: CPT

## 2022-11-02 PROCEDURE — 97162 PT EVAL MOD COMPLEX 30 MIN: CPT

## 2022-11-02 ASSESSMENT — PAIN DESCRIPTION - DESCRIPTORS: DESCRIPTORS: SHARP;ACHING

## 2022-11-02 ASSESSMENT — PAIN DESCRIPTION - ORIENTATION: ORIENTATION: LOWER;RIGHT

## 2022-11-02 ASSESSMENT — PAIN DESCRIPTION - LOCATION: LOCATION: BACK;LEG

## 2022-11-02 ASSESSMENT — PAIN DESCRIPTION - PAIN TYPE: TYPE: CHRONIC PAIN

## 2022-11-02 ASSESSMENT — PAIN DESCRIPTION - FREQUENCY: FREQUENCY: INTERMITTENT

## 2022-11-02 ASSESSMENT — PAIN SCALES - GENERAL: PAINLEVEL_OUTOF10: 0

## 2022-11-02 NOTE — PROGRESS NOTES
[address]     Ph: [phone]  Fax: [phone]      [x] Certification  [] Recertification [x]  Plan of Care  [] Progress Note [] Discharge      Referring Provider: Rachel Stevenson MD     From:  Kelli Ryder, PT,DPT  Patient: Waldemar Ramírez (52 y.o. male) : 1950 Date: 2022  Medical Diagnosis: Lumbosacral spondylosis without myelopathy [M47.817]       Treatment Diagnosis: decreased strength, decreased ROM, difficulty ambulation, increased time to complete transitional movements    Plan of Care/Certification Expiration Date: : 23   Progress Report Period from:  2022  to 2022    Visits to Date: 1 No Show: 0 Cancelled Appts: 0    OBJECTIVE:   Short Term Goals - Time Frame for Short Term Goals: 2-3 weeks    Goals Current/Discharge status  Status   Short Term Goal 1: patient will be indep with HEP to self manage and improve deficits upon d/c from skilled PT  Administered with pt understanding New   Short Term Goal 2: Patient will report 2-3/10 pain with functional movement to demo increased QOL and functional movement  Pain Screening  Patient Currently in Pain: Denies  Pain Assessment: 0-10  Pain Level: 0  Best Pain Level: 0  Worst Pain Level: 8  Pain Type: Chronic pain  Pain Location: Back, Leg  Pain Orientation: Lower, Right  Pain Radiating Towards: distal R thigh  Pain Descriptors: Sharp, Aching  Pain Frequency: Intermittent  Aggravating factors: Walking, Standing, Lifting  Pain Management/Relieving Factors: Laying supine, Sitting, Heat    Patient reports he is generally at a 5/10 New   Long Term Goals - Time Frame for Long Term Goals : 8-10 weeks  Goals Current/ Discharge status Status   Long Term Goal 1: Patient will improve olvin LE and hip strength to 4+/5 to demonstrate increased standing and walking tolerance while at work Strength LLE  L Hip Flexion: 4+/5  L Hip Extension: 3+/5  L Hip ABduction: 4+/5  L Knee Flexion: 5/5  L Knee Extension: 5/5  Strength RLE  R Hip Flexion: 4/5  R Hip Extension: 3+/5  R Hip ABduction: 3+/5  R Knee Flexion: 4+/5  R Knee Extension: 4+/5      Trunk Strength  Trunk Flexion: 3/5 (able to complete sit-up with olvin UE support) New   Long Term Goal 2: Patient will improve lumbar flexion and extension by >/=25% to improve lifting mechanics while at work  AROM Lumbar Spine   Flexion: 50% distally - to knees , inc pain  Extension: 50% distally - pain  [facility]  Lateral Flexion Left: WFL New   Long Term Goal 3: Patient will ambulate >/=300' with improved gait mechanics with reduced pain to demonstrate increased gait mechanics while at work and community Ambulation  Surface: Carpet  Device: No Device  Assistance: Independent  Quality of Gait: slow ja, decreased WB on R LE, decreased R foot clearance, antalgic  Distance: 100'  More Ambulation?: No New   Long Term Goal 4: Patient will improve 5xSTS by >/=5 seconds to improve functional movement during transitional movement 5 Times Sit to Stand  5 TIMES SIT TO STAND: 17.5 seconds New   Long Term Goal 5: Patient will improve oswestry by >/=10 points to demonstrate improved QOL 13/50 New     Body Structures, Functions, Activity Limitations Requiring Skilled Therapeutic Intervention: Decreased functional mobility , Decreased ROM, Decreased body mechanics, Decreased tolerance to work activity, Decreased strength, Increased pain  Assessment: patient is a 73 yo male presenting to therapy for chronic LBP. Patient demonstrated difficulty with lumbar ROM, specificially greater pain into flexion. Patient demo'd olvin decreased hip and LE strength. Patient required frequent breaks d/t dizziness with positional change as well as increased pain with movement. Pt presented with gait deficits & standing tolerance, affecting overall functional mobility, specifically while at work.  Denies hx of falls, denies red flag questions as well as N/T into distal extremity. SKilled therapy indicated for stated deficits to increase functional mobility and return to PLOF. Therapy Prognosis: Good, Fair      PT Education: Goals;PT Role;Plan of Care;Evaluative findings; Insurance;Home Exercise Program;Body mechanics    PLAN: [x] Evaluate and Treat  Frequency/Duration:  Plan Frequency: 1x  Plan weeks: 8-10 weeks  Current Treatment Recommendations: Strengthening, ROM, Balance training, Functional mobility training, Gait training, Neuromuscular re-education, Manual Therapy - Soft Tissue Mobilization, Manual Therapy - Joint Manipulation, Pain management, Return to work related activity, Home exercise program, Safety education & training, Modalities, Aquatics, Therapeutic activities                   Patient Status:[x] Continue/ Initiate plan of Care    [] Discharge PT. Recommend pt continue with HEP. [] Additional visits requested, Please re-certify for additional visits:    [] Hold         Signature: Electronically signed by Fito Hubbard PT on 11/2/22 at [address] [place] [address] EDT      If you have any questions or concerns, please don't hesitate to call.   Thank you for your referral.

## 2022-11-02 NOTE — PROGRESS NOTES
[address]  PHYSICAL THERAPY EVALUATION    Physical Therapy: Initial Evaluation    Patient: Waldemar Ramírez (13 y.o.     male)   Examination Date: 2022   :  1950 ;    Confirmed: Yes MRN: [id number]  CSN: [id number]   Insurance: Payor: MEDICARE / Plan: MEDICARE PART A AND B / Product Type: *No Product type* /   Insurance ID: [id number] - (Medicare) Secondary Insurance (if applicable):    Referring Physician: Rachel Stevenson MD       Visits to Date/Visits Approved: 1 /  (BMN)    No Show/Cancelled Appts: 0 / 0     Medical Diagnosis: Lumbosacral spondylosis without myelopathy [M47.817]        Treatment Diagnosis: decreased strength, decreased ROM, difficulty ambulation, increased time to complete transitional movements     PERTINENT MEDICAL HISTORY   Patient Assessed for Rehabilitation Services: Yes       Medical History: Chart Reviewed: Yes   Past Medical History:   Diagnosis Date    Anemia     Anxiety, generalized     BPH (benign prostatic hyperplasia)     CRF (chronic renal failure)     stage 3 -   17 patient denies    Depression     Has a tremor     HIGH CHOLESTEROL     Hypothyroid     IBD (inflammatory bowel disease)     Insomnia     Kidney stones     Seasonal allergic conjunctivitis     Tinnitus     chronic      Surgical History:   Past Surgical History:   Procedure Laterality Date    CATARACT REMOVAL Bilateral     COLONOSCOPY      KNEE SURGERY      Bilat- knee,legs    [place] COLON CA SCRN NOT  W [address] [place] [address] N/A 2017    COLONOSCOPY performed by Martha Astudillo MD at [facility] [facility] shoulder repair    THYROID SURGERY         Medications:   Current Outpatient Medications:     traMADol (ULTRAM) 50 MG tablet, Take 1 tablet by mouth every 6 hours as needed for Pain for up to 7 days. , Disp: 28 tablet, Rfl: 0    busPIRone (BUSPAR) 10 MG tablet, Take 1 tablet by mouth 3 times daily, Disp: 270 tablet, Rfl: 1 primidone (MYSOLINE) 250 MG tablet, Take 1 tablet by mouth nightly, Disp: 90 tablet, Rfl: 3    EUTHYROX 88 MCG tablet, Take 1 tablet by mouth once daily, Disp: 90 tablet, Rfl: 0    hydrocortisone 2.5 % cream, Apply topically 2 times daily. , Disp: 453 g, Rfl: 0    atorvastatin (LIPITOR) 40 MG tablet, TAKE ONE TABLET BY MOUTH NIGHTLY, Disp: 90 tablet, Rfl: 3    traZODone (DESYREL) 100 MG tablet, Take 1 tablet by mouth nightly as needed for Sleep, Disp: 90 tablet, Rfl: 1    Multiple Vitamins-Minerals (MENS 50+ MULTI VITAMIN/MIN PO), Take 1 tablet by mouth daily, Disp: , Rfl:   Allergies: Food, Lactose intolerance (gi), and Nsaids      SUBJECTIVE EXAMINATION     History obtained from[de-identified] Patient, Chart Review,      Family/Caregiver Present: Yes (Wife)    Subjective History: Onset Date:  (~2018)  Subjective: Patient reports to therapy for LBP, patient works at [employer] as a , states he lifts and bends a lot - which causes increased pain over lumbar region and near right hip. Patient also reports inc pain in thoracic spine. Hx of cortizone injection and states he only recieved 2 injections and the physician who gave he stated hit one of his nerve. will feel radiating pain into distal R thigh, however happens very rarely. Denies hx of fall & traumatic accident. f/u with Dr. Collette Zee next week. Additional Pertinent Hx (if applicable): broken bones, arthritis, chronic pain, acid reflux,depression, anxiety, hearing loss, vision problems   Prior diagnostic testing[de-identified] MRI, X-ray  Imaging: XR LUMBAR SPINE (2-3 VIEWS)    Result Date: 10/6/2022  EXAMINATION: LUMBAR SPINE 2 or 3 VIEWS COMPARISON: None. HISTORY: ORDERING SYSTEM PROVIDED HISTORY: Lumbosacral spondylosis without myelopathy TECHNOLOGIST PROVIDED HISTORY: What reading provider will be dictating this exam?->CRC FINDINGS: Vertebral compression fracture: None. There are large marginal osteophytes and L1-2 and L3-4 Acute fracture: None visualized radiographically. Alignement: There is grade 3 anterolisthesis of L5 on S1 and possible spondylolysis of posterior elements of L5. Disc narrowing: There is severe disc space narrowing at L5-S1 mild disc space narrowing at the remaining levels.      At L5-S1 there is crane 3 anterolisthesis of L5 on S1, possible spondylolysis of the posterior elements of L5 and severe disc space narrowing    Previous treatments prior to current episode?: Injections, Outpatient PT  Any changes to allergies, medications, or have you had any medical procedures/ER visits since your last visit?: No      Learning/Language: Learning  Does the patient/guardian have any barriers to learning?: No barriers  Will there be a co-learner?: No  What is the preferred language of the patient/guardian?: English  Is an  required?: No  How does the patient/guardian prefer to learn new concepts?: Listening, Reading, Demonstration, Pictures/Videos     Pain Screening    Pain Screening  Patient Currently in Pain: Denies  Pain Assessment: 0-10  Pain Level: 0  Best Pain Level: 0  Worst Pain Level: 8  Pain Type: Chronic pain  Pain Location: Back, Leg  Pain Orientation: Lower, Right  Pain Radiating Towards: distal R thigh  Pain Descriptors: Sharp, Aching  Pain Frequency: Intermittent  Aggravating factors: Walking, Standing, Lifting  Pain Management/Relieving Factors: Laying supine, Sitting, Heat    Functional Status    Social History:    Social History  Lives With: Spouse  Type of Home: House  Home Layout: One level, Laundry in basement  Home Access: Stairs to enter with rails  Entrance Stairs - Number of Steps: 4  Home Equipment: Dwana Galeazzi, standard    Occupation/Interests:   Occupation: Part time employment, Other(comment) (on restrictions per doctor - break every 2 hours and can sit whenever needed)  Type of Occupation:  at [employer]: golf    Prior Level of Function:   100% Independent        Current Level of Function:   50%      ADL Assistance: Independent  Homemaking Assistance: Independent  Homemaking Responsibilities: No  Ambulation Assistance: Independent  Transfer Assistance: Independent  Active : Yes         OBJECTIVE EXAMINATION   Review of Systems:  Vision: Within Functional Limits  Hearing: Within functional limits  Overall Orientation Status: Within Normal Limits  Follows Commands: Within Functional Limits    VBI Screening / Lumbar Screening:    Bowel/bladder disturbances: No  Saddle anesthesia: No  Unexplained weight loss: No  Severe motor weakness: No  Stumbling or giving way while walking: No  Unrelenting pain at night: No    Observations:   General Observations  General Observations: Yes  Description: No AD utilized, Lower R shoulder, decreased lumbar lordosis    Palpation:   Lumbar Spine Palpation: Inc TTP over R lumbar region, R illiac crest    Mobility:   Ambulation  Surface: Carpet  Device: No Device  Assistance: Independent  Quality of Gait: slow ja, decreased WB on R LE, decreased R foot clearance, antalgic  Distance: 100'  More Ambulation?: No  Stairs/Curb  Stairs?: Yes  Stairs  # Steps : 4  Stairs Height: 6\"  Rails: Bilateral  Device: No Device  Assistance: Modified independent   Comment: UE support on olvin Handrail, able to ascend/descend stair case reciprocally    General AROM LE:  Decreased hip ROM mobility    Left Strength  Right Strength         Strength LLE  L Hip Flexion: 4+/5  L Hip Extension: 3+/5  L Hip ABduction: 4+/5  L Knee Flexion: 5/5  L Knee Extension: 5/5    Strength RLE  R Hip Flexion: 4/5  R Hip Extension: 3+/5  R Hip ABduction: 3+/5  R Knee Flexion: 4+/5  R Knee Extension: 4+/5     Lumbar Assessment     AROM Lumbar Spine   Flexion: 50% distally - to knees , inc pain  Extension: 50% distally - pain  Lateral Flexion Right: WFL  Lateral Flexion Left: [facility]        Trunk Strength     Trunk Strength  Trunk Flexion: 3/5 (able to complete sit-up with olvin UE support)     Muscle Length/Flexibility: Muscle Length LE  90/90 SLR (Hamstring Tightness): decreased Hamstring flexibility R>L    Special Tests:   Special Tests Lumbar Spine  SLR: R (+), L (-)  Crossed SLR: R (+), L (-)  Special Tests for Hip  ADELINE Test: R (-), L (-)  Scour (OA, Labrum): R (-), L (-)    Additional Finding(s): Outcomes Score:  Exam: Oswestry: 13/50     5 Times Sit to Stand  5 TIMES SIT TO STAND: 17.5 seconds    Treatment:    Exercises:   Exercises  Exercise 1: Seated hamstring str on 4\" step 3 reps x 20-30 sec holds olvin  Exercise 2: seated hip march 1 set x 10 reps olvin  Exercise 3: seated hip abductoin 1 set x 20 reps YTB  Exercise 4: LAQ 1 set x 10 reps olvin  Exercise 5: attempted bridge - inc pain  Exercise 6: attempted LTR - inc pain  Exercise 7: NuStep*  Exercise 8: STS*  Exercise 20: HEP: seated hip abduction, hip march, hamstring str     Modalities:  Moist Heat (CPT 57419)  Patient Position: Seated  Number Minutes Moist Heat: 10  Moist heat location: Low back  Post treatment skin assessment: Intact  Limitations addressed: Pain modulation     Manual:  Manual Therapy  Soft Tissue Mobilizaton: STM to lumbar, hip*  Other: cupping*     *Indicates exercise,modality, or manual techniques to be initiated when appropriate       ASSESSMENT     Impression: Assessment: patient is a 75 yo male presenting to therapy for chronic LBP. Patient demonstrated difficulty with lumbar ROM, specificially greater pain into flexion. Patient demo'd olvin decreased hip and LE strength. Patient required frequent breaks d/t dizziness with positional change as well as increased pain with movement. Pt presented with gait deficits & standing tolerance, affecting overall functional mobility, specifically while at work. Denies hx of falls, denies red flag questions as well as N/T into distal extremity. SKilled therapy indicated for stated deficits to increase functional mobility and return to PLOF.     Body Structures, Functions, Activity Limitations Requiring Skilled Therapeutic Intervention: Decreased functional mobility , Decreased ROM, Decreased body mechanics, Decreased tolerance to work activity, Decreased strength, Increased pain    Statement of Medical Necessity: Physical Therapy is both indicated and medically necessary as outlined in the POC to increase the likelihood of meeting the functionally related goals stated below.      Patient's Activity Tolerance: Patient tolerated evaluation without incident, Patient limited by pain      Patient's rehabilitation potential/prognosis is considered to be: Good, Fair    Factors which may impact rehabilitation potential include: Age, Chronicity of problem, Pain tolerance/management  Measures taken to address barrier(s): See Patient Education  Patient Education: Goals, PT Role, Plan of Care, Evaluative findings, Insurance, Home Exercise Program, Body mechanics      GOALS   Patient Goal(s): Patient Goals : \"to lower my pain\"    Short Term Goals Completed by 2-3 weeks Goal Status   patient will be indep with HEP to self manage and improve deficits upon d/c from skilled PT New   Patient will report 2-3/10 pain with functional movement to demo increased QOL and functional movement New       Long Term Goals Completed by 8-10 weeks Goal Status   LTG 1 Patient will improve olvin LE and hip strength to 4+/5 to demonstrate increased standing and walking tolerance while at work New   LTG 2 Patient will improve lumbar flexion and extension by >/=25% to improve lifting mechanics while at work New   LTG 3 Patient will ambulate >/=300' with improved gait mechanics with reduced pain to demonstrate increased gait mechanics while at work and community New   LTG 4 Patient will improve 5xSTS by >/=5 seconds to improve functional movement during transitional movement New   LTG 5 Patient will improve oswestry by >/=10 points to demonstrate improved QOL New        TREATMENT PLAN       Requires PT Follow-Up: Yes    Treatment may include any combination of the following: Strengthening, ROM, Balance training, Functional mobility training, Gait training, Neuromuscular re-education, Manual Therapy - Soft Tissue Mobilization, Manual Therapy - Joint Manipulation, Pain management, Return to work related activity, Home exercise program, Safety education & training, Modalities, Aquatics, Therapeutic activities     Frequency / Duration:  Patient to be seen 1x times per week for 8-10 weeks weeks  Plan Comment:               Eval Complexity:   Decision Making: Medium Complexity  History: Personal Factors and/or Comorbidities Impacting POC: High  History: broken bones, arthritis, chronic pain, acid reflux,depression, anxiety, hearing loss, vision problems  Examination of body system(s) including body structures and functions, activity limitations, and/or participation restrictions: Medium  Exam: Oswestry: 13/50  Clinical Presentation: Medium  Clinical Presentation: Evolving    POST-PAIN     Pain Rating (0-10 pain scale):   \"better\"/10  Location and pain description same as pre-treatment unless indicated. Action: [] NA  [] Call Physician  [x] Perform HEP  [] Meds as prescribed    Evaluation and patient rights have been reviewed and patient agrees with plan of care. Yes  [x]  No  []   Explain:     Jazmyn Fall Risk Assessment  Risk Factor Scale  Score   History of Falls [] Yes  [x] No 25  0 0   Secondary Diagnosis [] Yes  [x] No 15  0 0   Ambulatory Aid [] Furniture  [] Crutches/cane/walker  [x] None/bedrest/wheelchair/nurse 30  15  0 0   IV/Heparin Lock [] Yes  [x] No 20  0 0   Gait/Transferring [] Impaired  [x] Weak  [] Normal/bedrest/immobile 20  10  0 10   Mental Status [] Forgets limitations  [x] Oriented to own ability 15  0 0      Total:10     Based on the Assessment score: check the appropriate box.   [x]  No intervention needed   Low =   Score of 0-24  []  Use standard prevention interventions Moderate =  Score of 24-44   [] Discuss fall prevention strategies   [] Indicate moderate falls risk on eval  []  Use high risk prevention interventions High = Score of 45 and higher   [] Discuss fall prevention strategies   [] Provide supervision during treatment time      Minutes:  PT Individual Minutes  Time In: 1500  Time Out: 1600  Minutes: 60  Timed Code Treatment Minutes: 20 Minutes  Procedure Minutes: 30 min evaluation + 10 min HP to LB     Timed Activity Minutes Units   Ther Ex 20 1       Electronically signed by Anisa Stevens PT on 11/2/22 at 4:09 PM EDT

## 2022-11-07 ENCOUNTER — OFFICE VISIT (OUTPATIENT)
Dept: PAIN MANAGEMENT | Age: 72
End: 2022-11-07
Payer: MEDICARE

## 2022-11-07 VITALS
TEMPERATURE: 97.7 F | DIASTOLIC BLOOD PRESSURE: 68 MMHG | WEIGHT: 204 LBS | SYSTOLIC BLOOD PRESSURE: 118 MMHG | BODY MASS INDEX: 27.63 KG/M2 | HEIGHT: 72 IN

## 2022-11-07 DIAGNOSIS — R52 PAIN: ICD-10-CM

## 2022-11-07 DIAGNOSIS — M47.817 LUMBOSACRAL SPONDYLOSIS WITHOUT MYELOPATHY: Primary | ICD-10-CM

## 2022-11-07 PROCEDURE — G8417 CALC BMI ABV UP PARAM F/U: HCPCS | Performed by: PAIN MEDICINE

## 2022-11-07 PROCEDURE — 1123F ACP DISCUSS/DSCN MKR DOCD: CPT | Performed by: PAIN MEDICINE

## 2022-11-07 PROCEDURE — 1036F TOBACCO NON-USER: CPT | Performed by: PAIN MEDICINE

## 2022-11-07 PROCEDURE — G8427 DOCREV CUR MEDS BY ELIG CLIN: HCPCS | Performed by: PAIN MEDICINE

## 2022-11-07 PROCEDURE — 99213 OFFICE O/P EST LOW 20 MIN: CPT | Performed by: PAIN MEDICINE

## 2022-11-07 PROCEDURE — G8484 FLU IMMUNIZE NO ADMIN: HCPCS | Performed by: PAIN MEDICINE

## 2022-11-07 PROCEDURE — 3017F COLORECTAL CA SCREEN DOC REV: CPT | Performed by: PAIN MEDICINE

## 2022-11-07 ASSESSMENT — ENCOUNTER SYMPTOMS
ALLERGIC/IMMUNOLOGIC NEGATIVE: 1
GASTROINTESTINAL NEGATIVE: 1
RESPIRATORY NEGATIVE: 1
EYES NEGATIVE: 1

## 2022-11-07 NOTE — PROGRESS NOTES
History of Present Illness     Patient Identification  Annie Liz is a 74 y.o. male. Patient information was obtained from patient and spouse/SO. Chief Complaint   Chief Complaint   Patient presents with    Back Pain       Patient presents with complaint of bilateral back pain. This is a result of no known injury. Onset of pain was 2 years ago and has been gradually worsening since. The pain is located in diffuse lower back, described as sharp and rated as moderate, severe, and 9 / 10, Occational radiation. Symptoms include no other symptoms. The patient denies weakness, numbness, incontinence. The patient denies other injuries. Care prior to arrival consisted of TFESI when one of his nerves got hit and had pain for 2 months with minimal relief. Had set of Facet blocks with about 80% relief. It took a while but he feels it is coming back. Past Medical History:   Diagnosis Date    Anemia     Anxiety, generalized     BPH (benign prostatic hyperplasia)     CRF (chronic renal failure)     stage 3 -   5/24/17 patient denies    Depression     Has a tremor     HIGH CHOLESTEROL     Hypothyroid     IBD (inflammatory bowel disease)     Insomnia     Kidney stones     Seasonal allergic conjunctivitis     Tinnitus     chronic      Family History   Adopted: Yes     Current Outpatient Medications   Medication Sig Dispense Refill    traMADol (ULTRAM) 50 MG tablet Take 1 tablet by mouth every 6 hours as needed for Pain for up to 7 days. 28 tablet 0    busPIRone (BUSPAR) 10 MG tablet Take 1 tablet by mouth 3 times daily 270 tablet 1    primidone (MYSOLINE) 250 MG tablet Take 1 tablet by mouth nightly 90 tablet 3    EUTHYROX 88 MCG tablet Take 1 tablet by mouth once daily 90 tablet 0    hydrocortisone 2.5 % cream Apply topically 2 times daily.  453 g 0    atorvastatin (LIPITOR) 40 MG tablet TAKE ONE TABLET BY MOUTH NIGHTLY 90 tablet 3    traZODone (DESYREL) 100 MG tablet Take 1 tablet by mouth nightly as needed for Sleep 90 tablet 1    Multiple Vitamins-Minerals (MENS 50+ MULTI VITAMIN/MIN PO) Take 1 tablet by mouth daily       No current facility-administered medications for this visit. Allergies   Allergen Reactions    Food Other (See Comments)     Burning sensation in stomach    Lactose Intolerance (Gi) Other (See Comments)     Sour stomach    Nsaids      Decreased kidney function       Review of Systems  Review of Systems   Constitutional: Negative. HENT: Negative. Eyes: Negative. Respiratory: Negative. Cardiovascular: Negative. Gastrointestinal: Negative. Endocrine: Negative. Genitourinary: Negative. Kidney stones   Musculoskeletal: Negative. Skin: Negative. Allergic/Immunologic: Negative. Neurological: Negative. Hematological: Negative. Psychiatric/Behavioral: Negative. All other systems reviewed and are negative. Physical Exam     /68   Temp 97.7 °F (36.5 °C) (Temporal)   Ht 6' (1.829 m)   Wt 204 lb (92.5 kg)   BMI 27.67 kg/m²   Physical Exam  Vitals and nursing note reviewed. Constitutional:       Appearance: Normal appearance. HENT:      Head: Normocephalic. Right Ear: Ear canal normal.      Left Ear: Ear canal normal.      Nose: Nose normal.      Mouth/Throat:      Mouth: Mucous membranes are moist.   Eyes:      Extraocular Movements: Extraocular movements intact. Conjunctiva/sclera: Conjunctivae normal.      Pupils: Pupils are equal, round, and reactive to light. Cardiovascular:      Rate and Rhythm: Normal rate and regular rhythm. Pulses: Normal pulses. Heart sounds: Normal heart sounds. Pulmonary:      Effort: Pulmonary effort is normal.      Breath sounds: Normal breath sounds. Abdominal:      General: Abdomen is flat. Bowel sounds are normal.      Palpations: Abdomen is soft. Musculoskeletal:         General: Normal range of motion. Cervical back: Normal range of motion and neck supple.       Comments: Good Gait, able to walk on Toes and Heels, Tender Rt sided Facets, No SI Joint Pain, No Pain on SLRs no pain on Gainslins. Skin:     General: Skin is warm. Capillary Refill: Capillary refill takes less than 2 seconds. Neurological:      General: No focal deficit present. Mental Status: He is alert and oriented to person, place, and time. Mental status is at baseline. Comments: Mild Tremors more Rt side. Psychiatric:         Mood and Affect: Mood normal.         Behavior: Behavior normal.         Thought Content: Thought content normal.         Judgment: Judgment normal.         Plan   Had 80% Relief Rt sided pain with facet blocks 10/20/22. Will repeat the blocks.

## 2022-11-08 ENCOUNTER — TELEPHONE (OUTPATIENT)
Dept: PAIN MANAGEMENT | Age: 72
End: 2022-11-08

## 2022-11-08 NOTE — TELEPHONE ENCOUNTER
Left voicemail for the patient to reschedule procedure from 11/10/22 to 11/17/22 per Dr. Jaylene Varghese. On 111/10/22 he doesn't have an xray tech.

## 2022-11-10 ENCOUNTER — HOSPITAL ENCOUNTER (OUTPATIENT)
Dept: PHYSICAL THERAPY | Age: 72
Setting detail: THERAPIES SERIES
Discharge: HOME | End: 2022-11-10
Payer: MEDICARE

## 2022-11-10 NOTE — PROGRESS NOTES
Therapy                            Cancellation/No-show Note    Date: 11/10/2022  Patient: Kimmy Sheppard (25 y.o. male)  : 1950  MRN:  [id number]  Referring Physician: Levi Rivera MD    Medical Diagnosis: Lumbosacral spondylosis without myelopathy [M47.817]      Visit Information:  Insurance: Payor: MEDICARE / Plan: MEDICARE PART A AND B / Product Type: *No Product type* /   Visits to Date: 1   No Show/Cancelled Appts: 0       For today's appointment patient:  [x]  Cancelled  []  Rescheduled appointment  []  No-show   []  Called pt to remind of next appointment     Reason given by patient:  []  Patient ill  [x]  Conflicting appointment  []  No transportation    []  Conflict with work  []  No reason given  []  Other:      [x] Pt has future appointments scheduled, no follow up needed  [] Pt requests to be on hold.     Reason:   If > 2 weeks please discuss with therapist.  [] Therapist to call pt for follow up     Comments:       Signature: Electronically signed by Leeann Cobb PT on 11/10/22 at 5:59 PM EST

## 2022-11-16 ENCOUNTER — HOSPITAL ENCOUNTER (OUTPATIENT)
Dept: PHYSICAL THERAPY | Age: 72
Setting detail: THERAPIES SERIES
Discharge: HOME | End: 2022-11-16
Payer: MEDICARE

## 2022-11-16 PROCEDURE — 97110 THERAPEUTIC EXERCISES: CPT

## 2022-11-16 ASSESSMENT — PAIN DESCRIPTION - LOCATION: LOCATION: BACK;LEG

## 2022-11-16 ASSESSMENT — PAIN DESCRIPTION - DESCRIPTORS: DESCRIPTORS: ACHING;SORE

## 2022-11-16 ASSESSMENT — PAIN SCALES - GENERAL: PAINLEVEL_OUTOF10: 3

## 2022-11-16 ASSESSMENT — PAIN DESCRIPTION - ORIENTATION: ORIENTATION: LOWER;RIGHT

## 2022-11-16 NOTE — PROGRESS NOTES
[facility]  Outpatient Physical Therapy    Treatment Note        Date: 2022  Patient: Erica Asher  : 1950   Confirmed: Yes  MRN: [id number]  Referring Provider: Mikael Gamez MD    Medical Diagnosis: Lumbosacral spondylosis without myelopathy [M47.817]       Treatment Diagnosis: decreased strength, decreased ROM, difficulty ambulation, increased time to complete transitional movements    Visit Information:  Insurance: Payor: MEDICARE / Plan: MEDICARE PART A AND B / Product Type: *No Product type* /   PT Visit Information  Onset Date:  (~)  PT Insurance Information: Medicare  Total # of Visits Approved:  (BMN)  Total # of Visits to Date: 2  Plan of Care/Certification Expiration Date: 23  No Show: 0  Progress Note Due Date: 22  Canceled Appointment: 1  Progress Note Counter: 2/8-10    Subjective Information:  Subjective: pain is 2-3/10 in my LB and right LE  HEP Compliance:  [x] Good [] Fair [] Poor [] Reports not doing due to:    Pain Screening  Patient Currently in Pain: Yes  Pain Level: 3  Pain Location: Back, Leg  Pain Orientation: Lower, Right  Pain Descriptors: Aching, Sore    Treatment:  Exercises:  Exercises  Exercise 1: HS stretch 20 sec x 3, b/l, seated  Exercise 2: seated hip march 1 set x 15 reps olvin  Exercise 3: seated hip abducton 1 set x 20 reps YTB, w/ LE's ext and off floor  Exercise 4: LAQ's 10 sec x 10, b/l  Exercise 7: NuStep L-3, 5 min  Exercise 9: sink ex x 10, w/ circles  Exercise 10: knee to opp shoulder 20 sec x 3, b/l     *Indicates exercise, modality, or manual techniques to be initiated when appropriate    Objective Measures:      Strength: [x] NT  [] MMT completed:     ROM: [x] NT  [] ROM measurements:         Assessment:    Body Structures, Functions, Activity Limitations Requiring Skilled Therapeutic Intervention: Decreased functional mobility , Decreased ROM, Decreased body mechanics, Decreased tolerance to work activity, Decreased strength, Increased pain  Assessment: continued w/ exercises per POC, added, sink exercises, w/ circles and knee to opp shoulder, b/l, patient w/ slight cramp w/ LAQ's on LLE, subsided quickly, patient w/ good tolerance overall to exercises. Treatment Diagnosis: decreased strength, decreased ROM, difficulty ambulation, increased time to complete transitional movements  Therapy Prognosis: Good, Fair      Post-Pain Assessment:       Pain Rating (0-10 pain scale):   \"alittle better\"/10   Location and pain description same as pre-treatment unless indicated.    Action: [] NA   [x] Perform HEP  [] Meds as prescribed  [] Modalities as prescribed   [] Call Physician     GOALS   Patient Goal(s): Patient Goals : \"to lower my pain\"    Short Term Goals Completed by 2-3 weeks Goal Status   STG 1 patient will be indep with HEP to self manage and improve deficits upon d/c from skilled PT In progress   STG 2 Patient will report 2-3/10 pain with functional movement to demo increased QOL and functional movement In progress       Long Term Goals Completed by 8-10 weeks Goal Status   LTG 1 Patient will improve olvin LE and hip strength to 4+/5 to demonstrate increased standing and walking tolerance while at work In progress   LTG 2 Patient will improve lumbar flexion and extension by >/=25% to improve lifting mechanics while at work In progress   LTG 3 Patient will ambulate >/=300' with improved gait mechanics with reduced pain to demonstrate increased gait mechanics while at work and community In progress   LTG 4 Patient will improve 5xSTS by >/=5 seconds to improve functional movement during transitional movement In progress   LTG 5 Patient will improve oswestry by >/=10 points to demonstrate improved QOL In progress            Plan:  Frequency/Duration:  Plan  Plan Frequency: 1x  Plan weeks: 8-10 weeks  Current Treatment Recommendations: Strengthening, ROM, Balance training, Functional mobility training, Gait training, Neuromuscular re-education, Manual Therapy - Soft Tissue Mobilization, Manual Therapy - Joint Manipulation, Pain management, Return to work related activity, Home exercise program, Safety education & training, Modalities, Aquatics, Therapeutic activities  Pt to continue current HEP. See objective section for any therapeutic exercise changes, additions or modifications this date.     Therapy Time:      PT Individual Minutes  Time In: 3086  Time Out: 5527  Minutes: 38  Timed Code Treatment Minutes: 38 Minutes  Procedure Minutes:0  Timed Activity Minutes Units   Ther Ex 38 3     Electronically signed by Lucie Fields PTA on 11/16/22 at 2:35 PM EST

## 2022-11-17 ENCOUNTER — PROCEDURE VISIT (OUTPATIENT)
Dept: PAIN MANAGEMENT | Age: 72
End: 2022-11-17
Payer: MEDICARE

## 2022-11-17 DIAGNOSIS — M47.817 LUMBOSACRAL SPONDYLOSIS WITHOUT MYELOPATHY: ICD-10-CM

## 2022-11-17 PROCEDURE — 64494 INJ PARAVERT F JNT L/S 2 LEV: CPT | Performed by: PAIN MEDICINE

## 2022-11-17 PROCEDURE — 64493 INJ PARAVERT F JNT L/S 1 LEV: CPT | Performed by: PAIN MEDICINE

## 2022-11-17 RX ORDER — BUPIVACAINE HYDROCHLORIDE 2.5 MG/ML
30 INJECTION, SOLUTION INFILTRATION; PERINEURAL ONCE
Status: COMPLETED | OUTPATIENT
Start: 2022-11-17 | End: 2022-11-17

## 2022-11-17 RX ORDER — LIDOCAINE HYDROCHLORIDE 10 MG/ML
10 INJECTION, SOLUTION EPIDURAL; INFILTRATION; INTRACAUDAL; PERINEURAL ONCE
Status: COMPLETED | OUTPATIENT
Start: 2022-11-17 | End: 2022-11-17

## 2022-11-17 RX ADMIN — BUPIVACAINE HYDROCHLORIDE 75 MG: 2.5 INJECTION, SOLUTION INFILTRATION; PERINEURAL at 14:06

## 2022-11-17 RX ADMIN — LIDOCAINE HYDROCHLORIDE 10 MG: 10 INJECTION, SOLUTION EPIDURAL; INFILTRATION; INTRACAUDAL; PERINEURAL at 14:06

## 2022-11-17 ASSESSMENT — ENCOUNTER SYMPTOMS
EYES NEGATIVE: 1
GASTROINTESTINAL NEGATIVE: 1
ALLERGIC/IMMUNOLOGIC NEGATIVE: 1
RESPIRATORY NEGATIVE: 1

## 2022-11-17 NOTE — PROGRESS NOTES
History of Present Illness     Patient Identification  Gal Poole is a 74 y.o. male. Patient information was obtained from patient and spouse/SO. Chief Complaint   Chief Complaint   Patient presents with    Back Pain     Right        Patient presents with complaint of bilateral back pain. This is a result of no known injury. Onset of pain was 2 years ago and has been gradually worsening since. The pain is located in diffuse lower back, described as sharp and rated as moderate, severe, and 9 / 10, Occational radiation. Symptoms include no other symptoms. The patient denies weakness, numbness, incontinence. The patient denies other injuries. Care prior to arrival consisted of TFESI when one of his nerves got hit and had pain for 2 months with minimal relief. Had set of Facet blocks with about 80% relief. It took a while but he feels it is coming back. Here for repeat facet blocks. Past Medical History:   Diagnosis Date    Anemia     Anxiety, generalized     BPH (benign prostatic hyperplasia)     CRF (chronic renal failure)     stage 3 -   5/24/17 patient denies    Depression     Has a tremor     HIGH CHOLESTEROL     Hypothyroid     IBD (inflammatory bowel disease)     Insomnia     Kidney stones     Seasonal allergic conjunctivitis     Tinnitus     chronic      Family History   Adopted: Yes     Current Outpatient Medications   Medication Sig Dispense Refill    busPIRone (BUSPAR) 10 MG tablet Take 1 tablet by mouth 3 times daily 270 tablet 1    primidone (MYSOLINE) 250 MG tablet Take 1 tablet by mouth nightly 90 tablet 3    EUTHYROX 88 MCG tablet Take 1 tablet by mouth once daily 90 tablet 0    hydrocortisone 2.5 % cream Apply topically 2 times daily.  453 g 0    atorvastatin (LIPITOR) 40 MG tablet TAKE ONE TABLET BY MOUTH NIGHTLY 90 tablet 3    traZODone (DESYREL) 100 MG tablet Take 1 tablet by mouth nightly as needed for Sleep 90 tablet 1    Multiple Vitamins-Minerals (MENS 50+ MULTI VITAMIN/MIN PO) Take 1 tablet by mouth daily       No current facility-administered medications for this visit. Allergies   Allergen Reactions    Food Other (See Comments)     Burning sensation in stomach    Lactose Intolerance (Gi) Other (See Comments)     Sour stomach    Nsaids      Decreased kidney function       Review of Systems  Review of Systems   Constitutional: Negative. HENT: Negative. Eyes: Negative. Respiratory: Negative. Cardiovascular: Negative. Gastrointestinal: Negative. Endocrine: Negative. Genitourinary: Negative. Kidney stones   Musculoskeletal: Negative. Skin: Negative. Allergic/Immunologic: Negative. Neurological: Negative. Hematological: Negative. Psychiatric/Behavioral: Negative. All other systems reviewed and are negative. Physical Exam     There were no vitals taken for this visit. Physical Exam  Vitals and nursing note reviewed. Constitutional:       Appearance: Normal appearance. HENT:      Head: Normocephalic. Right Ear: Ear canal normal.      Left Ear: Ear canal normal.      Nose: Nose normal.      Mouth/Throat:      Mouth: Mucous membranes are moist.   Eyes:      Extraocular Movements: Extraocular movements intact. Conjunctiva/sclera: Conjunctivae normal.      Pupils: Pupils are equal, round, and reactive to light. Cardiovascular:      Rate and Rhythm: Normal rate and regular rhythm. Pulses: Normal pulses. Heart sounds: Normal heart sounds. Pulmonary:      Effort: Pulmonary effort is normal.      Breath sounds: Normal breath sounds. Abdominal:      General: Abdomen is flat. Bowel sounds are normal.      Palpations: Abdomen is soft. Musculoskeletal:         General: Normal range of motion. Cervical back: Normal range of motion and neck supple. Comments: Good Gait, able to walk on Toes and Heels, Tender Rt sided Facets, No SI Joint Pain, No Pain on SLRs no pain on Gainslins.    Skin:     General: Skin is warm. Capillary Refill: Capillary refill takes less than 2 seconds. Neurological:      General: No focal deficit present. Mental Status: He is alert and oriented to person, place, and time. Mental status is at baseline. Comments: Mild Tremors more Rt side. Psychiatric:         Mood and Affect: Mood normal.         Behavior: Behavior normal.         Thought Content: Thought content normal.         Judgment: Judgment normal.         Plan   Right L4-5, 5-S1 Facet  blocks. Discussed procedure, risks and conplications. Concerns addressed, Informed consent obtained. Prone on Radha table, Rt oblique view used to yennifer skin for needle placement, skin scrubbed with Betadine, anesthetized with 2cc of 2% Lidocaine. 23g 5inch needle advanced towards junctions of superior articulating processes and transverse processes of L4 and L5 vertebrae rt side, and junction of Superior articulating processes and ala of sacrum on Rt side, injected 0.3cc of 0.5% Bupivacaine with 100% relief, pt washed off the Betadine, applied bandages and discharged home in stable condition.

## 2022-11-22 ENCOUNTER — NURSE ONLY (OUTPATIENT)
Dept: FAMILY MEDICINE CLINIC | Age: 72
End: 2022-11-22
Payer: MEDICARE

## 2022-11-22 ENCOUNTER — HOSPITAL ENCOUNTER (OUTPATIENT)
Dept: PHYSICAL THERAPY | Age: 72
Setting detail: THERAPIES SERIES
Discharge: HOME | End: 2022-11-22
Payer: MEDICARE

## 2022-11-22 DIAGNOSIS — Z23 NEED FOR INFLUENZA VACCINATION: Primary | ICD-10-CM

## 2022-11-22 PROCEDURE — 97110 THERAPEUTIC EXERCISES: CPT

## 2022-11-22 PROCEDURE — 90694 VACC AIIV4 NO PRSRV 0.5ML IM: CPT | Performed by: STUDENT IN AN ORGANIZED HEALTH CARE EDUCATION/TRAINING PROGRAM

## 2022-11-22 PROCEDURE — G0008 ADMIN INFLUENZA VIRUS VAC: HCPCS | Performed by: STUDENT IN AN ORGANIZED HEALTH CARE EDUCATION/TRAINING PROGRAM

## 2022-11-22 NOTE — PROGRESS NOTES
Vaccine Information Sheet, \"Influenza - Inactivated\"  given to Aj Thornton, or parent/legal guardian of  Aj Thornton and verbalized understanding. Patient responses:    Have you ever had a reaction to a flu vaccine? No  Are you able to eat eggs without adverse effects? Yes  Do you have any current illness? No  Have you ever had Guillian Eupora Syndrome? No    Flu vaccine given per order. Please see immunization tab.

## 2022-11-22 NOTE — PROGRESS NOTES
[facility]  Outpatient Physical Therapy    Treatment Note        Date: 2022  Patient: Gal Poole  : 1950   Confirmed: Yes  MRN: [id number]  Referring Provider: Akira Morales MD    Medical Diagnosis: Lumbosacral spondylosis without myelopathy [M47.817]       Treatment Diagnosis: decreased strength, decreased ROM, difficulty ambulation, increased time to complete transitional movements    Visit Information:  Insurance: Payor: MEDICARE / Plan: MEDICARE PART A AND B / Product Type: *No Product type* /   PT Visit Information  Onset Date:  (~)  PT Insurance Information: Medicare  Total # of Visits Approved:  (BMN)  Total # of Visits to Date: 2  Plan of Care/Certification Expiration Date: 23  No Show: 0  Progress Note Due Date: 22  Canceled Appointment: 1  Progress Note Counter: 3/8-10    Subjective Information:  Subjective: patient presented to therapy worried about what Dr. Luisa Draper told us was his issue and if we are actually working on the correct issue. States he does think he has gotten a little better since starting therapy. HEP Compliance:  [x] Good [] Fair [] Poor [] Reports not doing due to:    Pain Screening  Patient Currently in Pain: Denies    Treatment:  Exercises:  Exercises  Exercise 3: 3-way p-ball roll-outs 1 set x 10 reps x 10 sec holds  Exercise 7: NuStep L-5, 5 min  Exercise 9: sink exercises: hip extension, hip abduction, standing lumbar extension  2 sets x 15 reps olvin  Exercise 11: Paloff press RTB 1 set x 15 reps olvin  Exercise 20: HEP: cont current + paloff press, standing hip abd/ext, lumbar extension     *Indicates exercise, modality, or manual techniques to be initiated when appropriate    Objective Measures:      Strength: [x] NT  [] MMT completed:     ROM: [x] NT  [] ROM measurements:     Assessment:    Body Structures, Functions, Activity Limitations Requiring Skilled Therapeutic Intervention: Decreased functional mobility , Decreased ROM, Decreased body mechanics, Decreased tolerance to work activity, Decreased strength, Increased pain  Assessment: Significant education give to patient regarding current presentation and how physical therapy can be beneficial to his current chief complaint. Progressed activity this date to include WB activities to allow for increased stability and strengthening of olvin LE and trunk/core to improve pain response and overall functional mobility. Treatment Diagnosis: decreased strength, decreased ROM, difficulty ambulation, increased time to complete transitional movements  Therapy Prognosis: Good, Fair  PT Education: Home Exercise Program, Body mechanics, Anatomy of condition  Activity Tolerance  Activity Tolerance: Patient tolerated treatment well    Post-Pain Assessment:       Pain Rating (0-10 pain scale):   4/10   Location and pain description same as pre-treatment unless indicated.    Action: [] NA   [x] Perform HEP  [] Meds as prescribed  [] Modalities as prescribed   [] Call Physician     GOALS   Patient Goal(s): Patient Goals : \"to lower my pain\"    Short Term Goals Completed by 2-3 weeks Goal Status   STG 1 patient will be indep with HEP to self manage and improve deficits upon d/c from skilled PT In progress   STG 2 Patient will report 2-3/10 pain with functional movement to demo increased QOL and functional movement In progress     Long Term Goals Completed by 8-10 weeks Goal Status   LTG 1 Patient will improve olvin LE and hip strength to 4+/5 to demonstrate increased standing and walking tolerance while at work In progress   LTG 2 Patient will improve lumbar flexion and extension by >/=25% to improve lifting mechanics while at work In progress   LTG 3 Patient will ambulate >/=300' with improved gait mechanics with reduced pain to demonstrate increased gait mechanics while at work and community In progress   LTG 4 Patient will improve 5xSTS by >/=5 seconds to improve functional movement during transitional movement In progress   LTG 5 Patient will improve oswestry by >/=10 points to demonstrate improved QOL In progress          Plan:  Frequency/Duration:  Plan  Plan Frequency: 1x  Plan weeks: 8-10 weeks  Current Treatment Recommendations: Strengthening, ROM, Balance training, Functional mobility training, Gait training, Neuromuscular re-education, Manual Therapy - Soft Tissue Mobilization, Manual Therapy - Joint Manipulation, Pain management, Return to work related activity, Home exercise program, Safety education & training, Modalities, Aquatics, Therapeutic activities  Pt to continue current HEP. See objective section for any therapeutic exercise changes, additions or modifications this date.     Therapy Time:      PT Individual Minutes  Time In: 4247  Time Out: 1500  Minutes: 40  Timed Code Treatment Minutes: 40 Minutes  Procedure Minutes:  Timed Activity Minutes Units   Ther Ex 40 3     Electronically signed by Darcie Zhou PT on 11/22/22 at 4:02 PM EST

## 2022-11-29 ENCOUNTER — HOSPITAL ENCOUNTER (OUTPATIENT)
Dept: PHYSICAL THERAPY | Age: 72
Setting detail: THERAPIES SERIES
Discharge: HOME | End: 2022-11-29
Payer: MEDICARE

## 2022-11-29 DIAGNOSIS — E03.4 HYPOTHYROIDISM DUE TO ACQUIRED ATROPHY OF THYROID: ICD-10-CM

## 2022-11-29 PROCEDURE — 97110 THERAPEUTIC EXERCISES: CPT

## 2022-11-29 RX ORDER — LEVOTHYROXINE SODIUM 88 UG/1
TABLET ORAL
Qty: 90 TABLET | Refills: 1 | Status: SHIPPED | OUTPATIENT
Start: 2022-11-29

## 2022-11-29 NOTE — PROGRESS NOTES
[facility]  Outpatient Physical Therapy    Treatment Note        Date: 2022  Patient: Aj Thornton  : 1950   Confirmed: Yes  MRN: [id number]  Referring Provider: Gem Kumari MD    Medical Diagnosis: Lumbosacral spondylosis without myelopathy [M47.817]       Treatment Diagnosis: decreased strength, decreased ROM, difficulty ambulation, increased time to complete transitional movements    Visit Information:  Insurance: Payor: MEDICARE / Plan: MEDICARE PART A AND B / Product Type: *No Product type* /   PT Visit Information  Onset Date:  (~)  PT Insurance Information: Medicare  Total # of Visits Approved:  (BMN)  Total # of Visits to Date: 4  Plan of Care/Certification Expiration Date: 23  No Show: 0  Progress Note Due Date: 22  Canceled Appointment: 1  Progress Note Counter: 4/8-10    Subjective Information:  Subjective: Pt reports he cannot do the exercises everyday d/t his work schedule at this time  HEP Compliance:  [] Good [x] Fair [] Poor [] Reports not doing due to:    Pain Screening  Patient Currently in Pain: Denies    Treatment:  Exercises:  Exercises  Exercise 1: HS stretch 20 sec x 3, b/l, seated  Exercise 3: 3-way p-ball roll-outs 1 set x 10 reps x 10 sec holds  Exercise 7: sci fit level 1 x6 min  Exercise 9: sink exercises: hip extension, hip abduction, standing lumbar extension  2 sets x 15 reps olvin  Exercise 11: Paloff press RTB 1 set x 15 reps olvin       *Indicates exercise, modality, or manual techniques to be initiated when appropriate    Objective Measures:     Strength: [x] NT  [] MMT completed:      ROM: [x] NT  [] ROM measurements:      Assessment:    Body Structures, Functions, Activity Limitations Requiring Skilled Therapeutic Intervention: Decreased functional mobility , Decreased ROM, Decreased body mechanics, Decreased tolerance to work activity, Decreased strength, Increased pain  Assessment: Pt demonstrates progression toward goals this session secondary to tolerating ther ex without increased pain. Limited RB's required. Cues provided throughout for proper technique and max benefit of exercises. Education provided to pt on importance of continuing to perform HEP for continued progression. Recommend pt continue with current HEP secondary to fair compliance. Treatment Diagnosis: decreased strength, decreased ROM, difficulty ambulation, increased time to complete transitional movements  Therapy Prognosis: Good, Fair          Post-Pain Assessment:       Pain Rating (0-10 pain scale):   0/10   Location and pain description same as pre-treatment unless indicated.    Action: [] NA   [x] Perform HEP  [] Meds as prescribed  [] Modalities as prescribed   [] Call Physician     GOALS   Patient Goal(s): Patient Goals : \"to lower my pain\"    Short Term Goals Completed by 2-3 weeks Goal Status   STG 1 patient will be indep with HEP to self manage and improve deficits upon d/c from skilled PT In progress   STG 2 Patient will report 2-3/10 pain with functional movement to demo increased QOL and functional movement In progress       Long Term Goals Completed by 8-10 weeks Goal Status   LTG 1 Patient will improve olvin LE and hip strength to 4+/5 to demonstrate increased standing and walking tolerance while at work In progress   LTG 2 Patient will improve lumbar flexion and extension by >/=25% to improve lifting mechanics while at work In progress   LTG 3 Patient will ambulate >/=300' with improved gait mechanics with reduced pain to demonstrate increased gait mechanics while at work and community In progress   LTG 4 Patient will improve 5xSTS by >/=5 seconds to improve functional movement during transitional movement In progress   LTG 5 Patient will improve oswestry by >/=10 points to demonstrate improved QOL In progress            Plan:  Frequency/Duration:  Plan  Plan Frequency: 1x  Plan weeks: 8-10 weeks  Current Treatment Recommendations: Strengthening, ROM, Balance training, Functional mobility training, Gait training, Neuromuscular re-education, Manual Therapy - Soft Tissue Mobilization, Manual Therapy - Joint Manipulation, Pain management, Return to work related activity, Home exercise program, Safety education & training, Modalities, Aquatics, Therapeutic activities  Pt to continue current HEP. See objective section for any therapeutic exercise changes, additions or modifications this date.     Therapy Time:      PT Individual Minutes  Time In: 8897  Time Out: 1500  Minutes: 40  Timed Code Treatment Minutes: 40 Minutes  Procedure Minutes:0  Timed Activity Minutes Units   Ther Ex 40 3     Electronically signed by Leilani Bowman PTA on 11/29/22 at 2:44 PM EST

## 2022-12-05 ENCOUNTER — OFFICE VISIT (OUTPATIENT)
Dept: PAIN MANAGEMENT | Age: 72
End: 2022-12-05
Payer: MEDICARE

## 2022-12-05 VITALS
DIASTOLIC BLOOD PRESSURE: 68 MMHG | WEIGHT: 206 LBS | BODY MASS INDEX: 27.9 KG/M2 | SYSTOLIC BLOOD PRESSURE: 118 MMHG | HEIGHT: 72 IN | TEMPERATURE: 97.5 F

## 2022-12-05 DIAGNOSIS — M47.817 LUMBOSACRAL SPONDYLOSIS WITHOUT MYELOPATHY: Primary | ICD-10-CM

## 2022-12-05 PROCEDURE — G8417 CALC BMI ABV UP PARAM F/U: HCPCS | Performed by: PAIN MEDICINE

## 2022-12-05 PROCEDURE — G8484 FLU IMMUNIZE NO ADMIN: HCPCS | Performed by: PAIN MEDICINE

## 2022-12-05 PROCEDURE — 3017F COLORECTAL CA SCREEN DOC REV: CPT | Performed by: PAIN MEDICINE

## 2022-12-05 PROCEDURE — 1036F TOBACCO NON-USER: CPT | Performed by: PAIN MEDICINE

## 2022-12-05 PROCEDURE — 99213 OFFICE O/P EST LOW 20 MIN: CPT | Performed by: PAIN MEDICINE

## 2022-12-05 PROCEDURE — 1123F ACP DISCUSS/DSCN MKR DOCD: CPT | Performed by: PAIN MEDICINE

## 2022-12-05 PROCEDURE — G8427 DOCREV CUR MEDS BY ELIG CLIN: HCPCS | Performed by: PAIN MEDICINE

## 2022-12-05 RX ORDER — TRAMADOL HYDROCHLORIDE 50 MG/1
50 TABLET ORAL EVERY 6 HOURS PRN
Qty: 28 TABLET | Refills: 0 | Status: SHIPPED | OUTPATIENT
Start: 2022-12-05 | End: 2023-01-05

## 2022-12-05 ASSESSMENT — ENCOUNTER SYMPTOMS
RESPIRATORY NEGATIVE: 1
EYES NEGATIVE: 1
GASTROINTESTINAL NEGATIVE: 1
ALLERGIC/IMMUNOLOGIC NEGATIVE: 1

## 2022-12-05 NOTE — PROGRESS NOTES
History of Present Illness     Patient Identification  Richard Esparza is a 74 y.o. male. Patient information was obtained from patient and spouse/SO. Chief Complaint   Chief Complaint   Patient presents with    Back Pain       Patient presents with complaint of bilateral back pain s/p Facet blocks with 100% relief . This is a result of no known injury. Onset of pain was 2 years ago and has been gradually worsening since. The pain is located in diffuse lower back, described as sharp and rated as moderate, severe, and 9 / 10, Occational radiation. Symptoms include no other symptoms. The patient denies weakness, numbness, incontinence. The patient denies other injuries. Care prior to arrival consisted of TFESI when one of his nerves got hit and had pain for 2 months with minimal relief. Had set of Facet blocks with about 100% relief. It took a while but he feels it is coming back. Past Medical History:   Diagnosis Date    Anemia     Anxiety, generalized     BPH (benign prostatic hyperplasia)     CRF (chronic renal failure)     stage 3 -   5/24/17 patient denies    Depression     Has a tremor     HIGH CHOLESTEROL     Hypothyroid     IBD (inflammatory bowel disease)     Insomnia     Kidney stones     Seasonal allergic conjunctivitis     Tinnitus     chronic      Family History   Adopted: Yes     Current Outpatient Medications   Medication Sig Dispense Refill    levothyroxine (SYNTHROID) 88 MCG tablet Take 1 tablet by mouth once daily 90 tablet 1    busPIRone (BUSPAR) 10 MG tablet Take 1 tablet by mouth 3 times daily 270 tablet 1    primidone (MYSOLINE) 250 MG tablet Take 1 tablet by mouth nightly 90 tablet 3    hydrocortisone 2.5 % cream Apply topically 2 times daily.  453 g 0    atorvastatin (LIPITOR) 40 MG tablet TAKE ONE TABLET BY MOUTH NIGHTLY 90 tablet 3    traZODone (DESYREL) 100 MG tablet Take 1 tablet by mouth nightly as needed for Sleep 90 tablet 1    Multiple Vitamins-Minerals (MENS 50+ MULTI VITAMIN/MIN PO) Take 1 tablet by mouth daily       No current facility-administered medications for this visit. Allergies   Allergen Reactions    Food Other (See Comments)     Burning sensation in stomach    Lactose Intolerance (Gi) Other (See Comments)     Sour stomach    Nsaids      Decreased kidney function       Review of Systems  Review of Systems   Constitutional: Negative. HENT: Negative. Eyes: Negative. Respiratory: Negative. Cardiovascular: Negative. Gastrointestinal: Negative. Endocrine: Negative. Genitourinary: Negative. Kidney stones   Musculoskeletal: Negative. Skin: Negative. Allergic/Immunologic: Negative. Neurological: Negative. Hematological: Negative. Psychiatric/Behavioral: Negative. All other systems reviewed and are negative. Physical Exam     /68 (Site: Left Upper Arm, Position: Sitting, Cuff Size: Large Adult)   Temp 97.5 °F (36.4 °C) (Temporal)   Ht 6' (1.829 m)   Wt 206 lb (93.4 kg)   BMI 27.94 kg/m²   Physical Exam  Vitals and nursing note reviewed. Constitutional:       Appearance: Normal appearance. HENT:      Head: Normocephalic. Right Ear: Ear canal normal.      Left Ear: Ear canal normal.      Nose: Nose normal.      Mouth/Throat:      Mouth: Mucous membranes are moist.   Eyes:      Extraocular Movements: Extraocular movements intact. Conjunctiva/sclera: Conjunctivae normal.      Pupils: Pupils are equal, round, and reactive to light. Cardiovascular:      Rate and Rhythm: Normal rate and regular rhythm. Pulses: Normal pulses. Heart sounds: Normal heart sounds. Pulmonary:      Effort: Pulmonary effort is normal.      Breath sounds: Normal breath sounds. Abdominal:      General: Abdomen is flat. Bowel sounds are normal.      Palpations: Abdomen is soft. Musculoskeletal:         General: Normal range of motion. Cervical back: Normal range of motion and neck supple.       Comments: Good Gait, able to walk on Toes and Heels, Tender Rt sided Facets, No SI Joint Pain, No Pain on SLRs no pain on Gainslins. Skin:     General: Skin is warm. Capillary Refill: Capillary refill takes less than 2 seconds. Neurological:      General: No focal deficit present. Mental Status: He is alert and oriented to person, place, and time. Mental status is at baseline. Comments: Mild Tremors more Rt side. Psychiatric:         Mood and Affect: Mood normal.         Behavior: Behavior normal.         Thought Content: Thought content normal.         Judgment: Judgment normal.         Plan   Plan RFA Rt lumbar facets. Continue small dose tramadol for now.

## 2022-12-07 ENCOUNTER — HOSPITAL ENCOUNTER (OUTPATIENT)
Dept: PHYSICAL THERAPY | Age: 72
Setting detail: THERAPIES SERIES
Discharge: HOME | End: 2022-12-07
Payer: MEDICARE

## 2022-12-07 PROCEDURE — 97110 THERAPEUTIC EXERCISES: CPT

## 2022-12-07 ASSESSMENT — PAIN SCALES - GENERAL: PAINLEVEL_OUTOF10: 4

## 2022-12-07 ASSESSMENT — PAIN DESCRIPTION - DESCRIPTORS: DESCRIPTORS: ACHING;SORE

## 2022-12-07 ASSESSMENT — PAIN DESCRIPTION - PAIN TYPE: TYPE: CHRONIC PAIN

## 2022-12-07 ASSESSMENT — PAIN DESCRIPTION - LOCATION: LOCATION: BACK;LEG

## 2022-12-07 ASSESSMENT — PAIN DESCRIPTION - ORIENTATION: ORIENTATION: RIGHT;LEFT;LOWER

## 2022-12-07 NOTE — PROGRESS NOTES
[facility]  Outpatient Physical Therapy    Treatment Note        Date: 2022  Patient: Gee Adorno  : 1950   Confirmed: Yes  MRN: [id number]  Referring Provider: Richard Lomax MD    Medical Diagnosis: Lumbosacral spondylosis without myelopathy [M47.817]       Treatment Diagnosis: decreased strength, decreased ROM, difficulty ambulation, increased time to complete transitional movements    Visit Information:  Insurance: Payor: [address] [place] [address] / Plan: MEDICARE PART A AND B / Product Type: *No Product type* /   PT Visit Information  Onset Date:  (~)  PT Insurance Information: Medicare  Total # of Visits Approved:  (BMN)  Total # of Visits to Date: 5  Plan of Care/Certification Expiration Date: 23  No Show: 0  Progress Note Due Date: 22  Canceled Appointment: 1  Progress Note Counter: 5/8-10    Subjective Information:  Subjective: Pt reports he has a procedure on the Dec 15. Referring to radiofrequency ablation. Also reports he is sore from working this AM  HEP Compliance:  [] Good [x] Fair [] Poor [] Reports not doing due to:    Pain Screening  Patient Currently in Pain: Yes  Pain Assessment: 0-10  Pain Level: 4  Pain Type: Chronic pain  Pain Location: Back, Leg  Pain Orientation: Right, Left, Lower  Pain Descriptors: Aching, Sore    Treatment:  Exercises:  Exercises  Exercise 1: HS stretch on stair olvin 30\" x3  Exercise 3: 3-way p-ball roll-outs 1 set x 10 reps x 10 sec holds  Exercise 5: bridges x10  Exercise 6: LTR 3\" hold x10  Exercise 7: sci fit level 2.5 x6 min  Exercise 11: Paloff press RTB 1 set x 15 reps olvin    *Indicates exercise, modality, or manual techniques to be initiated when appropriate    Objective Measures:       Strength: [x] NT  [] MMT completed:      ROM: [x] NT  [] ROM measurements:    Assessment:    Body Structures, Functions, Activity Limitations Requiring Skilled Therapeutic Intervention: Decreased functional mobility , Decreased ROM, Decreased body mechanics, Decreased tolerance to work activity, Decreased strength, Increased pain  Assessment: Pt exhibits improvements toward goals this session secondary to performing bridging/LTR this session without increased pain. Pt able to maintain holds with LTR x3\" ea side. Multiple cues provided for proper technique and sequencing. Pt continues to experience mild discomfort with pball rollouts L/R but able to tolerate. Initiated standing hamstring stretch on stair vs seated at end of session. Pt reports decreased pain post session and prefers to perform CP/HP at home. Treatment Diagnosis: decreased strength, decreased ROM, difficulty ambulation, increased time to complete transitional movements  Therapy Prognosis: Good, Fair       Post-Pain Assessment:       Pain Rating (0-10 pain scale):  1-2 /10   Location and pain description same as pre-treatment unless indicated.    Action: [] NA   [x] Perform HEP  [] Meds as prescribed  [] Modalities as prescribed   [] Call Physician     GOALS   Patient Goal(s): Patient Goals : \"to lower my pain\"    Short Term Goals Completed by 2-3 weeks Goal Status   STG 1 patient will be indep with HEP to self manage and improve deficits upon d/c from skilled PT In progress   STG 2 Patient will report 2-3/10 pain with functional movement to demo increased QOL and functional movement In progress       Long Term Goals Completed by 8-10 weeks Goal Status   LTG 1 Patient will improve olvin LE and hip strength to 4+/5 to demonstrate increased standing and walking tolerance while at work In progress   LTG 2 Patient will improve lumbar flexion and extension by >/=25% to improve lifting mechanics while at work In progress   LTG 3 Patient will ambulate >/=300' with improved gait mechanics with reduced pain to demonstrate increased gait mechanics while at work and community In progress   LTG 4 Patient will improve 5xSTS by >/=5 seconds to improve functional movement during transitional movement In progress LTG 5 Patient will improve oswestry by >/=10 points to demonstrate improved QOL In progress       Plan:  Frequency/Duration:  Plan  Plan Frequency: 1x  Plan weeks: 8-10 weeks  Current Treatment Recommendations: Strengthening, ROM, Balance training, Functional mobility training, Gait training, Neuromuscular re-education, Manual, Pain management, Return to work related activity, Home exercise program, Safety education & training, Modalities, Aquatics, Therapeutic activities  Pt to continue current HEP. See objective section for any therapeutic exercise changes, additions or modifications this date.     Therapy Time:      PT Individual Minutes  Time In: 8672  Time Out: 4550  Minutes: 40  Timed Code Treatment Minutes: 40 Minutes  Procedure Minutes:0  Timed Activity Minutes Units   Ther Ex 40 3     Electronically signed by Regla Brown PTA on 12/7/22 at 2:16 PM EST

## 2022-12-14 ENCOUNTER — APPOINTMENT (OUTPATIENT)
Dept: PHYSICAL THERAPY | Age: 72
End: 2022-12-14
Payer: MEDICARE

## 2022-12-15 ENCOUNTER — OFFICE VISIT (OUTPATIENT)
Dept: PAIN MANAGEMENT | Age: 72
End: 2022-12-15
Payer: MEDICARE

## 2022-12-15 DIAGNOSIS — M47.817 LUMBOSACRAL SPONDYLOSIS WITHOUT MYELOPATHY: ICD-10-CM

## 2022-12-15 PROCEDURE — 64635 DESTROY LUMB/SAC FACET JNT: CPT | Performed by: PAIN MEDICINE

## 2022-12-15 PROCEDURE — 64636 DESTROY L/S FACET JNT ADDL: CPT | Performed by: PAIN MEDICINE

## 2022-12-15 RX ORDER — BUPIVACAINE HYDROCHLORIDE 2.5 MG/ML
30 INJECTION, SOLUTION INFILTRATION; PERINEURAL ONCE
Status: COMPLETED | OUTPATIENT
Start: 2022-12-15 | End: 2022-12-15

## 2022-12-15 RX ORDER — LIDOCAINE HYDROCHLORIDE 10 MG/ML
10 INJECTION, SOLUTION EPIDURAL; INFILTRATION; INTRACAUDAL; PERINEURAL ONCE
Status: COMPLETED | OUTPATIENT
Start: 2022-12-15 | End: 2022-12-15

## 2022-12-15 RX ADMIN — BUPIVACAINE HYDROCHLORIDE 75 MG: 2.5 INJECTION, SOLUTION INFILTRATION; PERINEURAL at 11:06

## 2022-12-15 RX ADMIN — LIDOCAINE HYDROCHLORIDE 10 MG: 10 INJECTION, SOLUTION EPIDURAL; INFILTRATION; INTRACAUDAL; PERINEURAL at 11:06

## 2022-12-15 ASSESSMENT — ENCOUNTER SYMPTOMS
EYES NEGATIVE: 1
GASTROINTESTINAL NEGATIVE: 1
RESPIRATORY NEGATIVE: 1
ALLERGIC/IMMUNOLOGIC NEGATIVE: 1

## 2022-12-15 NOTE — PROGRESS NOTES
History of Present Illness     Patient Identification  Annie Liz is a 74 y.o. male. Patient information was obtained from patient and spouse/SO. Chief Complaint   Chief Complaint   Patient presents with    Back Pain     right       Patient presents with complaint of bilateral back pain s/p Facet blocks with 100% relief here for RFAs . This is a result of no known injury. Onset of pain was 2 years ago and has been gradually worsening since. The pain is located in diffuse lower back, described as sharp and rated as moderate, severe, and 9 / 10, Occational radiation. Symptoms include no other symptoms. The patient denies weakness, numbness, incontinence. The patient denies other injuries. Care prior to arrival consisted of TFESI when one of his nerves got hit and had pain for 2 months with minimal relief. Had set of Facet blocks with about 100% relief. It took a while but he feels it is coming back. Past Medical History:   Diagnosis Date    Anemia     Anxiety, generalized     BPH (benign prostatic hyperplasia)     CRF (chronic renal failure)     stage 3 -   5/24/17 patient denies    Depression     Has a tremor     HIGH CHOLESTEROL     Hypothyroid     IBD (inflammatory bowel disease)     Insomnia     Kidney stones     Seasonal allergic conjunctivitis     Tinnitus     chronic      Family History   Adopted: Yes     Current Outpatient Medications   Medication Sig Dispense Refill    traMADol (ULTRAM) 50 MG tablet Take 1 tablet by mouth every 6 hours as needed for Pain for up to 28 doses. 28 tablet 0    levothyroxine (SYNTHROID) 88 MCG tablet Take 1 tablet by mouth once daily 90 tablet 1    busPIRone (BUSPAR) 10 MG tablet Take 1 tablet by mouth 3 times daily 270 tablet 1    primidone (MYSOLINE) 250 MG tablet Take 1 tablet by mouth nightly 90 tablet 3    hydrocortisone 2.5 % cream Apply topically 2 times daily.  453 g 0    atorvastatin (LIPITOR) 40 MG tablet TAKE ONE TABLET BY MOUTH NIGHTLY 90 tablet 3 traZODone (DESYREL) 100 MG tablet Take 1 tablet by mouth nightly as needed for Sleep 90 tablet 1    Multiple Vitamins-Minerals (MENS 50+ MULTI VITAMIN/MIN PO) Take 1 tablet by mouth daily       No current facility-administered medications for this visit. Allergies   Allergen Reactions    Food Other (See Comments)     Burning sensation in stomach    Lactose Intolerance (Gi) Other (See Comments)     Sour stomach    Nsaids      Decreased kidney function       Review of Systems  Review of Systems   Constitutional: Negative. HENT: Negative. Eyes: Negative. Respiratory: Negative. Cardiovascular: Negative. Gastrointestinal: Negative. Endocrine: Negative. Genitourinary: Negative. Kidney stones   Musculoskeletal: Negative. Skin: Negative. Allergic/Immunologic: Negative. Neurological: Negative. Hematological: Negative. Psychiatric/Behavioral: Negative. All other systems reviewed and are negative. Physical Exam     There were no vitals taken for this visit. Physical Exam  Vitals and nursing note reviewed. Constitutional:       Appearance: Normal appearance. HENT:      Head: Normocephalic. Right Ear: Ear canal normal.      Left Ear: Ear canal normal.      Nose: Nose normal.      Mouth/Throat:      Mouth: Mucous membranes are moist.   Eyes:      Extraocular Movements: Extraocular movements intact. Conjunctiva/sclera: Conjunctivae normal.      Pupils: Pupils are equal, round, and reactive to light. Cardiovascular:      Rate and Rhythm: Normal rate and regular rhythm. Pulses: Normal pulses. Heart sounds: Normal heart sounds. Pulmonary:      Effort: Pulmonary effort is normal.      Breath sounds: Normal breath sounds. Abdominal:      General: Abdomen is flat. Bowel sounds are normal.      Palpations: Abdomen is soft. Musculoskeletal:         General: Normal range of motion. Cervical back: Normal range of motion and neck supple. Comments: Good Gait, able to walk on Toes and Heels, Tender Rt sided Facets, No SI Joint Pain, No Pain on SLRs no pain on Gainslins. Skin:     General: Skin is warm. Capillary Refill: Capillary refill takes less than 2 seconds. Neurological:      General: No focal deficit present. Mental Status: He is alert and oriented to person, place, and time. Mental status is at baseline. Comments: Mild Tremors more Rt side. Psychiatric:         Mood and Affect: Mood normal.         Behavior: Behavior normal.         Thought Content: Thought content normal.         Judgment: Judgment normal.         Plan   Plan RFA Right L4-5, 5-S1 Facet. Discussed procedure, risks and conplications. Concerns addressed, Informed consent obtained. Prone on Radha table, Rt oblique view used to yennifer skin for needle placement, skin scrubbed with Betadine, anesthetized with 2cc of 2% Lidocaine. An SMK needle advanced towards junctions of superior articulating processes and transverse processes of L4 and L5 vertebrae rt side, and junction of Superior articulating processes and ala of sacrum on Rt side, injected 0.3cc of 0.5% Bupivacaine and achieved Radiofrequency neurolysis at each level at 90 degrees centigrade for 90 seconds at each spot, applied bandages and discharged home in stable condition.

## 2022-12-28 ENCOUNTER — TELEPHONE (OUTPATIENT)
Dept: FAMILY MEDICINE CLINIC | Age: 72
End: 2022-12-28

## 2022-12-28 NOTE — TELEPHONE ENCOUNTER
----- Message from Taisha Mcdonald sent at 12/28/2022  1:53 PM EST -----  Subject: Appointment Request    Reason for Call: Established Patient Appointment needed: Routine Existing   Condition Follow Up    QUESTIONS    Reason for appointment request? Available appointments did not meet   patient need     Additional Information for Provider? Pt would like to be seen on 1/30 for   a follow up, pt needs a note for work stating he can sit when needed.    Please call back to schedule.  ---------------------------------------------------------------------------  --------------  Leni Rosas INFO  [phone]; OK to leave message on voicemail  ---------------------------------------------------------------------------  --------------  SCRIPT ANSWERS  COVID Screen: [place]

## 2023-01-04 ENCOUNTER — HOSPITAL ENCOUNTER (OUTPATIENT)
Dept: PHYSICAL THERAPY | Age: 73
Setting detail: THERAPIES SERIES
Discharge: HOME | End: 2023-01-04
Payer: MEDICARE

## 2023-01-04 PROCEDURE — 97110 THERAPEUTIC EXERCISES: CPT

## 2023-01-04 ASSESSMENT — PAIN SCALES - GENERAL: PAINLEVEL_OUTOF10: 5

## 2023-01-04 ASSESSMENT — PAIN DESCRIPTION - LOCATION: LOCATION: BACK

## 2023-01-04 ASSESSMENT — PAIN DESCRIPTION - ORIENTATION: ORIENTATION: LOWER

## 2023-01-04 ASSESSMENT — PAIN DESCRIPTION - PAIN TYPE: TYPE: CHRONIC PAIN

## 2023-01-04 NOTE — PROGRESS NOTES
[facility] [address]     Ph: [phone]  Fax: [phone]      [] Certification  [] Recertification []  Plan of Care  [x] Progress Note [] Discharge      Referring Provider: Shantel Jaime MD     From:  Ryan Orta, PT, DPT  Patient: Femi Palacios (15 y.o. male) : 1950 Date: 2023  Medical Diagnosis: Lumbosacral spondylosis without myelopathy [M47.817]       Treatment Diagnosis: decreased strength, decreased ROM, difficulty ambulation, increased time to complete transitional movements    Plan of Care/Certification Expiration Date: : 23   Progress Report Period from: 11/10/22 to 2023    Visits to Date: 5 No Show: 0 Cancelled Appts: 1    OBJECTIVE:   Short Term Goals - Time Frame for Short Term Goals: 2-3 weeks    Goals Current/Discharge status  Status   Short Term Goal 1: patient will be indep with HEP to self manage and improve deficits upon d/c from skilled PT  Patient independent with current HEP  In progress   Short Term Goal 2: Patient will report 2-3/10 pain with functional movement to demo increased QOL and functional movement  Pain level 3-8/10. Varies based on activity level  In progress     Long Term Goals - Time Frame for Long Term Goals : 8-10 weeks  Goals Current/ Discharge status Status   Long Term Goal 1: Patient will improve olvin LE and hip strength to 4+/5 to demonstrate increased standing and walking tolerance while at work Strength RLE  R Hip Flexion: 4/5  R Hip Extension: 4/5  R Hip ABduction: 4/5  R Knee Flexion: 5/5  R Knee Extension: 5/5  Strength LLE  L Hip Flexion: 4+/5  L Hip Extension: 3+/5  L Hip ABduction: 4+/5  L Knee Flexion: 5/5  L Knee Extension: 5/5 In progress   Long Term Goal 2: Patient will improve lumbar flexion and extension by >/=25% to improve lifting mechanics while at work Patient demonstrates 75% WNL of lumbar extension/flexion.  C/o pain at end range.  In progress   Long Term Goal 3: Patient will ambulate >/=300' with improved gait mechanics with reduced pain to demonstrate increased gait mechanics while at work and community Patient ambulates > 300 feet with mild gait deviations. In progress   Long Term Goal 4: Patient will improve 5xSTS by >/=5 seconds to improve functional movement during transitional movement 18.34 In progress   Long Term Goal 5: Patient will improve oswestry by >/=10 points to demonstrate improved QOL N/A - patient did not fill out survey correctly  In progress     Body Structures, Functions, Activity Limitations Requiring Skilled Therapeutic Intervention: Decreased functional mobility , Decreased ROM, Decreased body mechanics, Decreased tolerance to work activity, Decreased strength, Increased pain  Assessment: patient is a 75 yo male presenting to skilled therapy for chronic LBP. Objective measures taken this date per medicare guidelines. Patient demonstrated some pain improvements, however not consistent. Patient demonstrated improvements in R hip extension and abduction this date. Patient also presented with improvement in lumbar ROM, however endorses increased pain at end-range. Patient has also demonstrated improvements in gait mechanics. No significant change in 5xSTS score this date. Continued skilled therapy indicated for further strength, ROM and mobility improvements to help reduced pain and increase QOL.      Therapy Prognosis: Good, Fair    PLAN: [x] Evaluate and Treat  Frequency/Duration:  Plan Frequency: 1x  Plan weeks: 8-10 weeks  Current Treatment Recommendations: Strengthening, ROM, Balance training, Functional mobility training, Gait training, Neuromuscular re-education, Manual, Pain management, Return to work related activity, Home exercise program, Safety education & training, Modalities, Aquatics, Therapeutic activities                   Patient Status:[x] Continue/ Initiate plan of Care    [] Discharge PT. Recommend pt continue with HEP. [] Additional visits requested, Please re-certify for additional visits:    [] Hold         Signature: objective measures by:Electronically signed by Asmita Morris PTA on 1/4/23 at 3:11 PM EST  Electronically signed by Bette Palma PT on 1/5/2023 at 12:51 PM    If you have any questions or concerns, please don't hesitate to call. Thank you for your referral.    I have reviewed this plan of care and certify a need for medically necessary rehabilitation services.     Physician Signature:__________________________________________________________  Date:  Please sign and return

## 2023-01-04 NOTE — PROGRESS NOTES
[facility]  Outpatient Physical Therapy    Treatment Note        Date: 2023  Patient: Tylor Partida  : 1950   Confirmed: Yes  MRN: [id number]  Referring Provider: Alexia Ryder MD    Medical Diagnosis: Lumbosacral spondylosis without myelopathy [M47.817]       Treatment Diagnosis: decreased strength, decreased ROM, difficulty ambulation, increased time to complete transitional movements    Visit Information:  Insurance: Payor: MEDICARE / Plan: MEDICARE PART A AND B / Product Type: *No Product type* /   PT Visit Information  Onset Date:  (~)  PT Insurance Information: Medicare  Total # of Visits Approved:  (BMN)  Total # of Visits to Date: 5  Plan of Care/Certification Expiration Date: 23  No Show: 0  Progress Note Due Date: 22  Canceled Appointment: 1  Progress Note Counter: 6/8-10    Subjective Information:  Subjective: Patient arrives after 2 week lapse in tx. States he had injections to his low back on 12/15 with minimal relief. Reports compliance with HEP. Continues to have increased pain during work shift. Pain is the least resting at home  .   HEP Compliance:  [x] Good [] Fair [] Poor [] Reports not doing due to:    Pain Screening  Patient Currently in Pain: Yes  Pain Level: 5  Pain Type: Chronic pain  Pain Location: Back  Pain Orientation: Lower    Treatment:  Exercises:  Exercises  Exercise 1: HS stretch on seated 3x30\"  Exercise 3: 3-way p-ball roll-outs 1 set x 10 reps x 10 sec holds  Exercise 5: objective measurements   Objective Measures:    [] NT  [x] MMT completed:  Strength RLE  R Hip Flexion: 4/5  R Hip Extension: 4/5  R Hip ABduction: 4/5  R Knee Flexion: 5/5  R Knee Extension: 5/5  Strength LLE  L Hip Flexion: 4+/5  L Hip Extension: 3+/5  L Hip ABduction: 4+/5  L Knee Flexion: 5/5  L Knee Extension: 5/5    ROM: [] NT  [] ROM measurements:     AROM Lumbar Spine   Flexion: 75% increased pain at end range  Extension: 75% increased pain at end range    Assessment: Body Structures, Functions, Activity Limitations Requiring Skilled Therapeutic Intervention: Decreased functional mobility , Decreased ROM, Decreased body mechanics, Decreased tolerance to work activity, Decreased strength, Increased pain  Assessment: Patient with recent lapse in tx due to busy schedule. Reports compliance with HEP. Objective measurements completed this date for insurance PN. Patient demonstrates improved bilateral LE strength and lumbar ROM. Continues to feel limited by pain at end range in each direction. Treatment Diagnosis: decreased strength, decreased ROM, difficulty ambulation, increased time to complete transitional movements  Therapy Prognosis: Good, Fair  Post-Pain Assessment:       Pain Rating (0-10 pain scale):  5-6 /10   Location and pain description same as pre-treatment unless indicated.    Action: [] NA   [] Perform HEP  [x] Meds as prescribed  [] Modalities as prescribed   [] Call Physician     GOALS   Patient Goal(s): Patient Goals : \"to lower my pain\"    Short Term Goals Completed by 2-3 weeks Goal Status   STG 1 patient will be indep with HEP to self manage and improve deficits upon d/c from skilled PT In progress   STG 2 Patient will report 2-3/10 pain with functional movement to demo increased QOL and functional movement In progress       Long Term Goals Completed by 8-10 weeks Goal Status   LTG 1 Patient will improve olvin LE and hip strength to 4+/5 to demonstrate increased standing and walking tolerance while at work In progress   LTG 2 Patient will improve lumbar flexion and extension by >/=25% to improve lifting mechanics while at work In progress   LTG 3 Patient will ambulate >/=300' with improved gait mechanics with reduced pain to demonstrate increased gait mechanics while at work and community In progress   LTG 4 Patient will improve 5xSTS by >/=5 seconds to improve functional movement during transitional movement In progress   LTG 5 Patient will improve oswestry by >/=10 points to demonstrate improved QOL In progress       Plan:  Frequency/Duration:  Plan  Plan Frequency: 1x  Plan weeks: 8-10 weeks  Current Treatment Recommendations: Strengthening, ROM, Balance training, Functional mobility training, Gait training, Neuromuscular re-education, Manual, Pain management, Return to work related activity, Home exercise program, Safety education & training, Modalities, Aquatics, Therapeutic activities  Pt to continue current HEP. See objective section for any therapeutic exercise changes, additions or modifications this date.     Therapy Time:      PT Individual Minutes  Time In: 0247  Time Out: 7051  Minutes: 42  Timed Code Treatment Minutes: 42 Minutes  Procedure Minutes:  Timed Activity Minutes Units   Ther Ex 42 3     Electronically signed by Jean-Paul Castillo PTA on 1/4/23 at 3:32 PM EST

## 2023-01-11 ENCOUNTER — APPOINTMENT (OUTPATIENT)
Dept: PHYSICAL THERAPY | Age: 73
End: 2023-01-11
Payer: MEDICARE

## 2023-01-11 PROCEDURE — 97110 THERAPEUTIC EXERCISES: CPT

## 2023-01-11 RX ORDER — PRIMIDONE 50 MG/1
TABLET ORAL
Qty: 270 TABLET | Refills: 0 | Status: SHIPPED | OUTPATIENT
Start: 2023-01-11

## 2023-01-11 NOTE — TELEPHONE ENCOUNTER
Pharmacy is requesting medication refill.  Please approve or deny this request.    Rx requested:  Requested Prescriptions     Pending Prescriptions Disp Refills    primidone (MYSOLINE) 50 MG tablet [Pharmacy Med Name: Primidone Oral Tablet 50 MG] 270 tablet 0     Sig: TAKE 3 TABLETS BY MOUTH NIGHTLY         Last Office Visit:   10/5/2022      Next Visit Date:  Future Appointments   Date Time Provider Hemant Berry   1/11/2023  2:20 PM Arben Ray, [address]   1/17/2023 11:30 AM Makeda Stallworth MD [facility] EMERGENCY [facility] [place]   1/20/2023  3:40 PM Arben Ray, [address]   3/29/2023  1:00 PM Israel Freitas DO [facility]   4/5/2023  3:30 PM MD [facility] -

## 2023-01-11 NOTE — PROGRESS NOTES
[facility]  Outpatient Physical Therapy   Treatment Note        Date: 2023  Patient: Kanwal Ribeiro  : 1950   Confirmed: Yes  MRN: [id number]  Referring Provider: Reuben Lakhani MD      Medical Diagnosis: Lumbosacral spondylosis without myelopathy [M47.817]      Treatment Diagnosis: decreased strength, decreased ROM, difficulty ambulation, increased time to complete transitional movements    Visit Information:  Insurance: Payor: MEDICARE / Plan: MEDICARE PART A AND B / Product Type: *No Product type* /   PT Visit Information  Onset Date:  (~)  PT Insurance Information: Medicare  Total # of Visits Approved:  (BMN)  Total # of Visits to Date: 7  Plan of Care/Certification Expiration Date: 23  No Show: 0  Progress Note Due Date: 23  Canceled Appointment: 1  Progress Note Counter: 7/8-10    Subjective Information:  Subjective: patient reports he has no pain today, still having difficulty with repetitive bending at work and increased pain with long periods of standing. otherwise reports he feels a little better when walking around  HEP Compliance:  [x] Good [] Fair [] Poor [] Reports not doing due to:    Pain Screening  Patient Currently in Pain: Denies    Treatment:  Exercises:  Exercises  Exercise 1: HS str on step 5 Reps x 20 sec holds, olvin  Exercise 7: sci fit level 3.0 x5 min  Exercise 11: Paloff press RTB 1 set x 15 reps, olvin ; paloff press w/ rotation 1 set x 15 reps, olvin  Exercise 12: 6\" fwd/lat step-ups x15 reps, olvin, each  Exercise 13: lateral stepping & marches at counter 5 laps x RTB above knee, each  Exercise 14: lift-chop 2 sets x 10 reps x 4#, olvin  Exercise 20: HEP: cont current + hamstring stretch    *Indicates exercise, modality, or manual techniques to be initiated when appropriate    Objective Measures:   Not Assessed this visit, PN completed last tx session    Assessment:    Body Structures, Functions, Activity Limitations Requiring Skilled Therapeutic Intervention: Decreased functional mobility , Decreased ROM, Decreased body mechanics, Decreased tolerance to work activity, Decreased strength, Increased pain  Assessment: Progressed activity this date to include more [address] activities. Patient endorses increased pain with standing while at work. Tx focused on hip and core strengthening to help improve standing tolerance. initiated rotational movements to increase muscular strength in different planes of motion and to improve strength during lifting while at work. Patient to d/c NV. Treatment Diagnosis: decreased strength, decreased ROM, difficulty ambulation, increased time to complete transitional movements  Therapy Prognosis: Good, Fair     Post-Pain Assessment:       Pain Rating (0-10 pain scale):   0/10   Location and pain description same as pre-treatment unless indicated.    Action: [] NA   [x] Perform HEP  [] Meds as prescribed  [] Modalities as prescribed   [] Call Physician     GOALS   Patient Goal(s): Patient Goals : \"to lower my pain\"    Short Term Goals Completed by 2-3 weeks Goal Status   STG 1 patient will be indep with HEP to self manage and improve deficits upon d/c from skilled PT In progress   STG 2 Patient will report 2-3/10 pain with functional movement to demo increased QOL and functional movement In progress     Long Term Goals Completed by 8-10 weeks Goal Status   LTG 1 Patient will improve olvin LE and hip strength to 4+/5 to demonstrate increased standing and walking tolerance while at work In progress   LTG 2 Patient will improve lumbar flexion and extension by >/=25% to improve lifting mechanics while at work In progress   LTG 3 Patient will ambulate >/=300' with improved gait mechanics with reduced pain to demonstrate increased gait mechanics while at work and community In progress   LTG 4 Patient will improve 5xSTS by >/=5 seconds to improve functional movement during transitional movement In progress   LTG 5 Patient will improve oswestry by >/=10 points to demonstrate improved QOL In progress          Plan:  Frequency/Duration:  Plan  Plan Frequency: 1x  Plan weeks: 8-10 weeks  Current Treatment Recommendations: Strengthening, ROM, Balance training, Functional mobility training, Gait training, Neuromuscular re-education, Manual, Pain management, Return to work related activity, Home exercise program, Safety education & training, Modalities, Aquatics, Therapeutic activities  Additional Comments: D/C NV  Pt to continue current HEP. See objective section for any therapeutic exercise changes, additions or modifications this date.     Therapy Time:      PT Individual Minutes  Time In: 2279  Time Out: 5647  Minutes: 40  Timed Code Treatment Minutes: 40 Minutes  Procedure Minutes:  Timed Activity Minutes Units   Ther Ex 40 3     Electronically signed by Nubia Gonzalez PT on 1/11/23 at 4:23 PM EST

## 2023-01-17 ENCOUNTER — OFFICE VISIT (OUTPATIENT)
Dept: PAIN MANAGEMENT | Age: 73
End: 2023-01-17
Payer: MEDICARE

## 2023-01-17 VITALS
HEIGHT: 72 IN | SYSTOLIC BLOOD PRESSURE: 116 MMHG | DIASTOLIC BLOOD PRESSURE: 72 MMHG | HEART RATE: 71 BPM | TEMPERATURE: 97.6 F | WEIGHT: 208 LBS | OXYGEN SATURATION: 97 % | BODY MASS INDEX: 28.17 KG/M2

## 2023-01-17 DIAGNOSIS — M47.817 LUMBOSACRAL SPONDYLOSIS WITHOUT MYELOPATHY: Primary | ICD-10-CM

## 2023-01-17 PROCEDURE — 1123F ACP DISCUSS/DSCN MKR DOCD: CPT | Performed by: PAIN MEDICINE

## 2023-01-17 PROCEDURE — 1036F TOBACCO NON-USER: CPT | Performed by: PAIN MEDICINE

## 2023-01-17 PROCEDURE — 99213 OFFICE O/P EST LOW 20 MIN: CPT | Performed by: PAIN MEDICINE

## 2023-01-17 PROCEDURE — G8427 DOCREV CUR MEDS BY ELIG CLIN: HCPCS | Performed by: PAIN MEDICINE

## 2023-01-17 PROCEDURE — 3017F COLORECTAL CA SCREEN DOC REV: CPT | Performed by: PAIN MEDICINE

## 2023-01-17 PROCEDURE — G8417 CALC BMI ABV UP PARAM F/U: HCPCS | Performed by: PAIN MEDICINE

## 2023-01-17 PROCEDURE — G8484 FLU IMMUNIZE NO ADMIN: HCPCS | Performed by: PAIN MEDICINE

## 2023-01-17 NOTE — PROGRESS NOTES
History of Present Illness     Patient Identification  Vivienne Graham is a 74 y.o. male. Patient information was obtained from patient and spouse/SO. Chief Complaint   Low back pain      Patient presents with complaint of bilateral back pain s/p Facet blocks with 100% relief and had RFAs with 70% relief . Rest of his pain is Upper lumbar areaThis is a result of no known injury. Onset of pain was 2 years ago and has been gradually worsening since. The pain is located in diffuse lower back, described as sharp and rated as 4/10 Occational radiation. Symptoms include no other symptoms. The patient denies weakness, numbness, incontinence. The patient denies other injuries. Care prior to arrival consisted of [facility] . Past Medical History:   Diagnosis Date    Anemia     Anxiety, generalized     BPH (benign prostatic hyperplasia)     CRF (chronic renal failure)     stage 3 -   5/24/17 patient denies    Depression     Has a tremor     HIGH CHOLESTEROL     Hypothyroid     IBD (inflammatory bowel disease)     Insomnia     Kidney stones     Seasonal allergic conjunctivitis     Tinnitus     chronic      Family History   Adopted: Yes     Current Outpatient Medications   Medication Sig Dispense Refill    primidone (MYSOLINE) 50 MG tablet TAKE 3 TABLETS BY MOUTH NIGHTLY 270 tablet 0    levothyroxine (SYNTHROID) 88 MCG tablet Take 1 tablet by mouth once daily 90 tablet 1    busPIRone (BUSPAR) 10 MG tablet Take 1 tablet by mouth 3 times daily 270 tablet 1    primidone (MYSOLINE) 250 MG tablet Take 1 tablet by mouth nightly 90 tablet 3    hydrocortisone 2.5 % cream Apply topically 2 times daily.  453 g 0    atorvastatin (LIPITOR) 40 MG tablet TAKE ONE TABLET BY MOUTH NIGHTLY 90 tablet 3    traZODone (DESYREL) 100 MG tablet Take 1 tablet by mouth nightly as needed for Sleep 90 tablet 1    Multiple Vitamins-Minerals (MENS 50+ MULTI VITAMIN/MIN PO) Take 1 tablet by mouth daily       No current facility-administered medications for this visit. Allergies   Allergen Reactions    Food Other (See Comments)     Burning sensation in stomach    Lactose Intolerance (Gi) Other (See Comments)     Sour stomach    Nsaids      Decreased kidney function       Review of Systems  Review of Systems   Constitutional: Negative. HENT: Negative. Eyes: Negative. Respiratory: Negative. Cardiovascular: Negative. Gastrointestinal: Negative. Endocrine: Negative. Genitourinary: Negative. Kidney stones   Musculoskeletal: Negative. Skin: Negative. Allergic/Immunologic: Negative. Neurological: Negative. Hematological: Negative. Psychiatric/Behavioral: Negative. All other systems reviewed and are negative. Physical Exam     There were no vitals taken for this visit. Physical Exam  Vitals and nursing note reviewed. Constitutional:       Appearance: Normal appearance. HENT:      Head: Normocephalic. Right Ear: Ear canal normal.      Left Ear: Ear canal normal.      Nose: Nose normal.      Mouth/Throat:      Mouth: Mucous membranes are moist.   Eyes:      Extraocular Movements: Extraocular movements intact. Conjunctiva/sclera: Conjunctivae normal.      Pupils: Pupils are equal, round, and reactive to light. Cardiovascular:      Rate and Rhythm: Normal rate and regular rhythm. Pulses: Normal pulses. Heart sounds: Normal heart sounds. Pulmonary:      Effort: Pulmonary effort is normal.      Breath sounds: Normal breath sounds. Abdominal:      General: Abdomen is flat. Bowel sounds are normal.      Palpations: Abdomen is soft. Musculoskeletal:         General: Normal range of motion. Cervical back: Normal range of motion and neck supple. Comments: Good Gait, able to walk on Toes and Heels, Tender Rt sided Facets, No SI Joint Pain, No Pain on SLRs no pain on Gainslins. Skin:     General: Skin is warm.       Capillary Refill: Capillary refill takes less than 2 seconds. Neurological:      General: No focal deficit present. Mental Status: He is alert and oriented to person, place, and time. Mental status is at baseline. Comments: Mild Tremors more Rt side. Psychiatric:         Mood and Affect: Mood normal.         Behavior: Behavior normal.         Thought Content: Thought content normal.         Judgment: Judgment normal.         Plan   S/P Right L4-5, 5-S1 Facet RFAs.   Plan facet blocks higher as needed  Continue PRN Tramadol taking about 2 a day as needed

## 2023-01-20 ENCOUNTER — APPOINTMENT (OUTPATIENT)
Dept: PHYSICAL THERAPY | Age: 73
End: 2023-01-20
Payer: MEDICARE

## 2023-01-20 PROCEDURE — 97110 THERAPEUTIC EXERCISES: CPT

## 2023-01-20 ASSESSMENT — PAIN DESCRIPTION - LOCATION: LOCATION: BACK

## 2023-01-20 ASSESSMENT — PAIN DESCRIPTION - PAIN TYPE: TYPE: CHRONIC PAIN

## 2023-01-20 ASSESSMENT — PAIN DESCRIPTION - DESCRIPTORS: DESCRIPTORS: ACHING

## 2023-01-20 ASSESSMENT — PAIN SCALES - GENERAL: PAINLEVEL_OUTOF10: 1

## 2023-01-20 NOTE — PROGRESS NOTES
[facility]  Outpatient Physical Therapy   Treatment Note        Date: 2023  Patient: Jani Parker  : 1950   Confirmed: Yes  MRN: [id number]  Referring Provider: Margarita Go MD      Medical Diagnosis: Lumbosacral spondylosis without myelopathy [M47.817]      Treatment Diagnosis: decreased strength, decreased ROM, difficulty ambulation, increased time to complete transitional movements    Visit Information:  Insurance: Payor: MEDICARE / Plan: MEDICARE PART A AND B / Product Type: *No Product type* /   PT Visit Information  Onset Date:  (~)  PT Insurance Information: Medicare  Total # of Visits Approved:  (BMN)  Total # of Visits to Date: 8  Plan of Care/Certification Expiration Date: 23  No Show: 0  Progress Note Due Date: 23  Canceled Appointment: 1  Progress Note Counter: 8/8-10    Subjective Information:  Subjective: patient arrived about 7 min late. ready for d/c today. reports worst part is just when he is at work from standing at work. reports pain is anywhere from 0-1 when not at work  HEP Compliance:  [] Good [x] Fair [x] Poor [] Reports not doing due to:    Pain Screening  Patient Currently in Pain: Yes  Pain Level: 1  Pain Type: Chronic pain  Pain Location: Back  Pain Descriptors: Aching    Treatment:  Exercises:  Exercises  Exercise 17: reviewed appropriate lifting mechanics for at work.   Exercise 18: administered GTB for progression of activity and discussed progression of activity  Exercise 19: Objective Measures for D/C  Exercise 20: HEP: cont current     *Indicates exercise, modality, or manual techniques to be initiated when appropriate    Objective Measures:     STG 1 Current Status[de-identified] : reports non-compliance with HEP, reports completing 2x a week  STG 2 Current Status[de-identified] : not at work 0-3/10 ; when at work reports 5-8/10 depending on busy work schedule    LTG 1 Current Status[de-identified] : 5/5 in all MMT, olvin  LTG 2 Current Status[de-identified] : [facility] both flexion & extension, no pain  LTG 3 Current Status[de-identified] 1/20: >/=300', no pain, equal WBing olvin, equal step length, WFL  LTG 4 Current Status[de-identified] 1/20: 5xSTS 6 sec, no UE  LTG 5 Current Status[de-identified] 1/20: 5/45    Assessment: Body Structures, Functions, Activity Limitations Requiring Skilled Therapeutic Intervention: Decreased functional mobility , Decreased ROM, Decreased body mechanics, Decreased tolerance to work activity, Decreased strength, Increased pain  Assessment: Patient is a 73 yo male presenting to skilled PT for LBP. patient completed a total of 8 visits between the dates of 11/02/22 to 1/20/23. Goal assessment completed this date for discharge. Patient was able to meet and/or partially meet 5/7 of his stated goals. Patient continues to struggle with increased LBP while at work and standing for >/=4 hours. He was noncompliant with HEP only completing about 2x a week. He did not meet his HUSAM goal, however improved by 8 points, indicating improvements in QOL. strength, ROM, 5xSTS and ambulation all improved over the course of his POC. Patient appropriate for discharge this date. Treatment Diagnosis: decreased strength, decreased ROM, difficulty ambulation, increased time to complete transitional movements  Therapy Prognosis: Good, Fair     Post-Pain Assessment:       Pain Rating (0-10 pain scale):  0 /10   Location and pain description same as pre-treatment unless indicated.    Action: [x] NA   [] Perform HEP  [] Meds as prescribed  [] Modalities as prescribed   [] Call Physician     GOALS   Patient Goal(s): Patient Goals : \"to lower my pain\"    Short Term Goals Completed by 2-3 weeks Goal Status   STG 1 patient will be indep with HEP to self manage and improve deficits upon d/c from skilled PT Not Met   STG 2 Patient will report 2-3/10 pain with functional movement to demo increased QOL and functional movement Partially met     Long Term Goals Completed by 8-10 weeks Goal Status   LTG 1 Patient will improve olvin LE and hip strength to 4+/5 to demonstrate increased standing and walking tolerance while at work Met   LTG 2 Patient will improve lumbar flexion and extension by >/=25% to improve lifting mechanics while at work Met   LTG 3 Patient will ambulate >/=300' with improved gait mechanics with reduced pain to demonstrate increased gait mechanics while at work and community Met   LTG 4 Patient will improve 5xSTS by >/=5 seconds to improve functional movement during transitional movement Met   LTG 5 Patient will improve oswestry by >/=10 points to demonstrate improved QOL Not Met     Plan:  Frequency/Duration:  Plan  Additional Comments: Discharge  Pt to continue current HEP. See objective section for any therapeutic exercise changes, additions or modifications this date.     Therapy Time:      PT Individual Minutes  Time In: 6690  Time Out: 1612  Minutes: 25  Timed Code Treatment Minutes: 25 Minutes  Procedure Minutes:  Timed Activity Minutes Units   Ther Ex 25 2     Electronically signed by Marleen Dumont, PT on 1/20/23 at 4:19 PM EST

## 2023-01-20 NOTE — PROGRESS NOTES
[address]     Ph: [phone]  Fax: [phone]    [] Certification  [] Recertification []  Plan of Care  [] Progress Note [x] Discharge      Referring Provider: Sandee Colorado MD    From:  Jesenia Stringer, PT,DPT    Patient: Ross Rodgers (43 y.o. male) : 1950 Date: 2023   Medical Diagnosis: Lumbosacral spondylosis without myelopathy [M47.817]    Treatment Diagnosis: decreased strength, decreased ROM, difficulty ambulation, increased time to complete transitional movements    Plan of Care/Certification Expiration Date: 23   Progress Report Period from: 2023  to 2023    Visits to Date: 8 No Show: 0 Cancelled Appts: 1    OBJECTIVE:   Short Term Goals - Time Frame for Short Term Goals: 2-3 weeks    Goals Current/Discharge status  Status   Short Term Goal 1: patient will be indep with HEP to self manage and improve deficits upon d/c from skilled PT  STG 1 Current Status[de-identified] : reports non-compliance with HEP, reports completing 2x a week   Not Met   Short Term Goal 2: Patient will report 2-3/10 pain with functional movement to demo increased QOL and functional movement  STG 2 Current Status[de-identified] : not at work 0-3/10 ; when at work reports 5-8/10 depending on busy work schedule   Partially met     Long Term Goals - Time Frame for Long Term Goals : 8-10 weeks  Goals Current/ Discharge status Status   Long Term Goal 1: Patient will improve olvin LE and hip strength to 4+/5 to demonstrate increased standing and walking tolerance while at work LTG 1 Current Status[de-identified] : 5/5 in all MMT, olvin   Met   Long Term Goal 2: Patient will improve lumbar flexion and extension by >/=25% to improve lifting mechanics while at work LTG 2 Current Status[de-identified] : [facility] for both flexion & extension, no pain   Met   Long Term Goal 3: Patient will ambulate >/=300' with improved gait mechanics with reduced pain to demonstrate increased gait mechanics while at work and community LTG 3 Current Status:: 1/20: >/=300', no pain, equal WBing olvin, equal step length, WFL   Met   Long Term Goal 4: Patient will improve 5xSTS by >/=5 seconds to improve functional movement during transitional movement LTG 4 Current Status:: 1/20: 5xSTS 6 sec, no UE   Met   Long Term Goal 5: Patient will improve oswestry by >/=10 points to demonstrate improved QOL LTG 5 Current Status:: 1/20: 5/45   Not Met     Body Structures, Functions, Activity Limitations Requiring Skilled Therapeutic Intervention: Decreased functional mobility , Decreased ROM, Decreased body mechanics, Decreased tolerance to work activity, Decreased strength, Increased pain  Assessment: Patient is a 73 yo male presenting to skilled PT for LBP. patient completed a total of 8 visits between the dates of 11/02/22 to 1/20/23. Goal assessment completed this date for discharge. Patient was able to meet and/or partially meet 5/7 of his stated goals. Patient continues to struggle with increased LBP while at work and standing for >/=4 hours. He was noncompliant with HEP only completing about 2x a week. He did not meet his HUSAM goal, however improved by 8 points, indicating improvements in QOL. strength, ROM, 5xSTS and ambulation all improved over the course of his POC. Patient appropriate for discharge this date.  Therapy Prognosis: Good, Fair    PLAN:   Frequency/Duration:  Additional Comments: Discharge        Patient Status:[] Continue/ Initiate plan of Care     [x] Discharge PT.  Recommend pt continue with HEP.      [] Additional visits requested, Please re-certify for additional visits:     [] Hold     Signature:  Electronically signed by Antonieta Barajas PT on 1/20/23 at 4:20 PM EST          If you have any questions or concerns, please don't hesitate to call.  Thank you for your referral.

## 2023-02-10 ENCOUNTER — OFFICE VISIT (OUTPATIENT)
Dept: FAMILY MEDICINE CLINIC | Age: 73
End: 2023-02-10
Payer: MEDICARE

## 2023-02-10 VITALS
DIASTOLIC BLOOD PRESSURE: 80 MMHG | BODY MASS INDEX: 28.17 KG/M2 | HEART RATE: 83 BPM | OXYGEN SATURATION: 97 % | SYSTOLIC BLOOD PRESSURE: 115 MMHG | HEIGHT: 72 IN | WEIGHT: 208 LBS

## 2023-02-10 DIAGNOSIS — H10.32 ACUTE BACTERIAL CONJUNCTIVITIS OF LEFT EYE: ICD-10-CM

## 2023-02-10 DIAGNOSIS — N18.30 STAGE 3 CHRONIC KIDNEY DISEASE, UNSPECIFIED WHETHER STAGE 3A OR 3B CKD (HCC): ICD-10-CM

## 2023-02-10 DIAGNOSIS — F33.41 RECURRENT MAJOR DEPRESSIVE DISORDER IN PARTIAL REMISSION (HCC): ICD-10-CM

## 2023-02-10 PROCEDURE — 99213 OFFICE O/P EST LOW 20 MIN: CPT | Performed by: NURSE PRACTITIONER

## 2023-02-10 PROCEDURE — 1123F ACP DISCUSS/DSCN MKR DOCD: CPT | Performed by: NURSE PRACTITIONER

## 2023-02-10 PROCEDURE — 1036F TOBACCO NON-USER: CPT | Performed by: NURSE PRACTITIONER

## 2023-02-10 PROCEDURE — G8417 CALC BMI ABV UP PARAM F/U: HCPCS | Performed by: NURSE PRACTITIONER

## 2023-02-10 PROCEDURE — G8484 FLU IMMUNIZE NO ADMIN: HCPCS | Performed by: NURSE PRACTITIONER

## 2023-02-10 PROCEDURE — 3017F COLORECTAL CA SCREEN DOC REV: CPT | Performed by: NURSE PRACTITIONER

## 2023-02-10 PROCEDURE — G8427 DOCREV CUR MEDS BY ELIG CLIN: HCPCS | Performed by: NURSE PRACTITIONER

## 2023-02-10 RX ORDER — OFLOXACIN 3 MG/ML
1-2 SOLUTION/ DROPS OPHTHALMIC 4 TIMES DAILY
Qty: 10 ML | Refills: 0 | Status: SHIPPED | OUTPATIENT
Start: 2023-02-10 | End: 2023-02-17

## 2023-02-10 SDOH — ECONOMIC STABILITY: FOOD INSECURITY: WITHIN THE PAST 12 MONTHS, THE FOOD YOU BOUGHT JUST DIDN'T LAST AND YOU DIDN'T HAVE MONEY TO GET MORE.: NEVER TRUE

## 2023-02-10 SDOH — ECONOMIC STABILITY: HOUSING INSECURITY
IN THE LAST 12 MONTHS, WAS THERE A TIME WHEN YOU DID NOT HAVE A STEADY PLACE TO SLEEP OR SLEPT IN A SHELTER (INCLUDING NOW)?: NO

## 2023-02-10 SDOH — ECONOMIC STABILITY: INCOME INSECURITY: HOW HARD IS IT FOR YOU TO PAY FOR THE VERY BASICS LIKE FOOD, HOUSING, MEDICAL CARE, AND HEATING?: NOT HARD AT ALL

## 2023-02-10 SDOH — ECONOMIC STABILITY: FOOD INSECURITY: WITHIN THE PAST 12 MONTHS, YOU WORRIED THAT YOUR FOOD WOULD RUN OUT BEFORE YOU GOT MONEY TO BUY MORE.: NEVER TRUE

## 2023-02-10 ASSESSMENT — PATIENT HEALTH QUESTIONNAIRE - PHQ9
SUM OF ALL RESPONSES TO PHQ QUESTIONS 1-9: 0
9. THOUGHTS THAT YOU WOULD BE BETTER OFF DEAD, OR OF HURTING YOURSELF: 0
SUM OF ALL RESPONSES TO PHQ QUESTIONS 1-9: 0
6. FEELING BAD ABOUT YOURSELF - OR THAT YOU ARE A FAILURE OR HAVE LET YOURSELF OR YOUR FAMILY DOWN: 0
5. POOR APPETITE OR OVEREATING: 0
SUM OF ALL RESPONSES TO PHQ QUESTIONS 1-9: 0
7. TROUBLE CONCENTRATING ON THINGS, SUCH AS READING THE NEWSPAPER OR WATCHING TELEVISION: 0
2. FEELING DOWN, DEPRESSED OR HOPELESS: 0
SUM OF ALL RESPONSES TO PHQ9 QUESTIONS 1 & 2: 0
3. TROUBLE FALLING OR STAYING ASLEEP: 0
SUM OF ALL RESPONSES TO PHQ QUESTIONS 1-9: 0
10. IF YOU CHECKED OFF ANY PROBLEMS, HOW DIFFICULT HAVE THESE PROBLEMS MADE IT FOR YOU TO DO YOUR WORK, TAKE CARE OF THINGS AT HOME, OR GET ALONG WITH OTHER PEOPLE: 0
4. FEELING TIRED OR HAVING LITTLE ENERGY: 0
1. LITTLE INTEREST OR PLEASURE IN DOING THINGS: 0
8. MOVING OR SPEAKING SO SLOWLY THAT OTHER PEOPLE COULD HAVE NOTICED. OR THE OPPOSITE, BEING SO FIGETY OR RESTLESS THAT YOU HAVE BEEN MOVING AROUND A LOT MORE THAN USUAL: 0

## 2023-02-10 NOTE — Clinical Note
[address] [facility],5Th Floor  [address] [address]  Phone: [phone]  Fax: [address] [place] [address], ARMINDA - CNP        February 10, 2023     Patient: Yasmani Armendariz   YOB: 1950   Date of Visit: 2/10/2023       To Whom It May Concern: It is my medical opinion that Earnstine Halo {Work release (duty restriction):77198}. If you have any questions or concerns, please don't hesitate to call.     Sincerely,        ARMINDA Reed - CNP

## 2023-02-10 NOTE — PROGRESS NOTES
Subjective:      Patient ID: Sherman Lancaster is a 74 y.o. male who presents today for:  Chief Complaint   Patient presents with    Eye Problem     Left eye. Red, watery, swelling sx started a couple of days ago.        HPI      A couple days ago his eyes was bothering him   Swollen   Red   He rubs his eyes a lot because they get itchy   Drainage  Some pus like stuff on the eyelashes this morning   He had cataract surgery and has lens in       Past Medical History:   Diagnosis Date    Anemia     Anxiety, generalized     BPH (benign prostatic hyperplasia)     CRF (chronic renal failure)     stage 3 -   17 patient denies    Depression     Has a tremor     HIGH CHOLESTEROL     Hypothyroid     IBD (inflammatory bowel disease)     Insomnia     Kidney stones     Seasonal allergic conjunctivitis     Tinnitus     chronic      Past Surgical History:   Procedure Laterality Date    CATARACT REMOVAL Bilateral     COLONOSCOPY      KNEE SURGERY      Bilat- knee,legs    [place] COLON [place] SCRN NOT  [facility] N/A 2017    COLONOSCOPY performed by Reza Saldana MD at [address] shoulder repair    THYROID SURGERY       Social History     Socioeconomic History    Marital status:      Spouse name: Oskar Gomes    Number of children: Not on file    Years of education: Not on file    Highest education level: Not on file   Occupational History    Not on file   Tobacco Use    Smoking status: Former     Packs/day: 0.50     Years: 30.00     Pack years: 15.00     Types: Pipe, Cigarettes     Quit date: 2002     Years since quittin.1    Smokeless tobacco: Never   Vaping Use    Vaping Use: Never used   Substance and Sexual Activity    Alcohol use: Yes     Comment: Occasional    Drug use: No    Sexual activity: Yes     Partners: Female   Other Topics Concern    Not on file   Social History Narrative    Not on file     Social Determinants of Health     Financial Resource Strain: Low Risk     Difficulty of Paying Living Expenses: Not hard at all   Food Insecurity: No Food Insecurity    Worried About [address] [employer] in the Last Year: Never true    Ran Out of Food in the Last Year: Never true   Transportation Needs: No Transportation Needs    Lack of Transportation (Medical): No    Lack of Transportation (Non-Medical): No   Physical Activity: Sufficiently Active    Days of Exercise per Week: 3 days    Minutes of Exercise per Session: 150+ min   Stress: Not on file   Social Connections: Not on file   Intimate Partner Violence: Not on file   Housing Stability: Unknown    Unable to Pay for Housing in the Last Year: Not on file    Number of Places Lived in the Last Year: Not on file    Unstable Housing in the Last Year: No     Family History   Adopted: Yes     Allergies   Allergen Reactions    Food Other (See Comments)     Burning sensation in stomach    Lactose Intolerance (Gi) Other (See Comments)     Sour stomach    Nsaids      Decreased kidney function     Current Outpatient Medications   Medication Sig Dispense Refill    ofloxacin (OCUFLOX) 0.3 % solution Place 1-2 drops into the left eye 4 times daily for 7 days 10 mL 0    primidone (MYSOLINE) 50 MG tablet TAKE 3 TABLETS BY MOUTH NIGHTLY 270 tablet 0    levothyroxine (SYNTHROID) 88 MCG tablet Take 1 tablet by mouth once daily 90 tablet 1    busPIRone (BUSPAR) 10 MG tablet Take 1 tablet by mouth 3 times daily 270 tablet 1    primidone (MYSOLINE) 250 MG tablet Take 1 tablet by mouth nightly 90 tablet 3    hydrocortisone 2.5 % cream Apply topically 2 times daily. 453 g 0    atorvastatin (LIPITOR) 40 MG tablet TAKE ONE TABLET BY MOUTH NIGHTLY 90 tablet 3    traZODone (DESYREL) 100 MG tablet Take 1 tablet by mouth nightly as needed for Sleep 90 tablet 1    Multiple Vitamins-Minerals (MENS 50+ MULTI VITAMIN/MIN PO) Take 1 tablet by mouth daily       No current facility-administered medications for this visit.           Review of Systems Constitutional:  Negative for chills, fatigue and fever. HENT:  Negative for congestion, rhinorrhea, sore throat and trouble swallowing. Eyes:  Positive for discharge, redness and itching. Negative for visual disturbance. Respiratory:  Negative for cough, shortness of breath and wheezing. Gastrointestinal:  Negative for diarrhea, nausea and vomiting. Musculoskeletal:  Negative for myalgias. Skin:  Negative for rash. Neurological:  Negative for dizziness, light-headedness and headaches. Objective:   /80   Pulse 83   Ht 6' (1.829 m)   Wt 208 lb (94.3 kg)   SpO2 97%   BMI 28.21 kg/m²     Physical Exam  Vitals reviewed. Constitutional:       Appearance: Normal appearance. HENT:      Head: Normocephalic and atraumatic. Nose: Nose normal.      Mouth/Throat:      Lips: Pink. Mouth: Mucous membranes are moist.   Eyes:      General: Lids are normal.      Conjunctiva/sclera:      Left eye: Left conjunctiva is injected. Comments: Limbus spared    Cardiovascular:      Rate and Rhythm: Normal rate. Pulmonary:      Effort: Pulmonary effort is normal.   Abdominal:      Tenderness: There is no guarding. Musculoskeletal:      Cervical back: Normal range of motion. Skin:     General: Skin is warm and dry. Neurological:      General: No focal deficit present. Mental Status: He is alert and oriented to person, place, and time. Psychiatric:         Mood and Affect: Mood normal.         Behavior: Behavior normal. Behavior is cooperative. Assessment:       Diagnosis Orders   1. Acute bacterial conjunctivitis of left eye  ofloxacin (OCUFLOX) 0.3 % solution      2. Recurrent major depressive disorder in partial remission ([facility] [address]  Managed by PCP      3. Stage 3 chronic kidney disease, unspecified whether stage 3a or 3b CKD ([facility] [address]  Managed by PCP        No results found for this visit on 02/10/23.    Plan:     Assessment & Plan   Tani Beltran was seen today for eye problem. Diagnoses and all orders for this visit:    Acute bacterial conjunctivitis of left eye  -     ofloxacin (OCUFLOX) 0.3 % solution; Place 1-2 drops into the left eye 4 times daily for 7 days    Recurrent major depressive disorder in partial remission (HCC)    Stage 3 chronic kidney disease, unspecified whether stage 3a or 3b CKD ([facility] [address]    No orders of the defined types were placed in this encounter. Orders Placed This Encounter   Medications    ofloxacin (OCUFLOX) 0.3 % solution     Sig: Place 1-2 drops into the left eye 4 times daily for 7 days     Dispense:  10 mL     Refill:  0       Medications Discontinued During This Encounter   Medication Reason    ofloxacin (OCUFLOX) 0.3 % solution REORDER     Return if symptoms worsen or fail to improve. Reviewed with the patient/family: current clinical status & medications. Side effects of the medication prescribed today, as well as treatment plan/rationale and result expectations have been discussed with the patient/family who expresses understanding. Patient will be discharged home in stable condition. Follow up with PCP to evaluate treatment results or return if symptoms worsen or fail to improve. Discussed signs and symptoms which require immediate follow-up in ED/call to 911. Understanding verbalized. I have reviewed the patient's medical history in detail and updated the computerized patient record.     Anu Doss, ARMINDA - CNP

## 2023-02-16 ASSESSMENT — ENCOUNTER SYMPTOMS
DIARRHEA: 0
SHORTNESS OF BREATH: 0
NAUSEA: 0
WHEEZING: 0
EYE ITCHING: 1
VOMITING: 0
COUGH: 0
TROUBLE SWALLOWING: 0
RHINORRHEA: 0
SORE THROAT: 0
EYE REDNESS: 1
EYE DISCHARGE: 1

## 2023-02-21 ENCOUNTER — TELEPHONE (OUTPATIENT)
Dept: FAMILY MEDICINE CLINIC | Age: 73
End: 2023-02-21

## 2023-02-21 NOTE — TELEPHONE ENCOUNTER
Pt came into the office asking if he can get an updated work restriction letter. The letter must state that  \" He has arthritis in his back that require him to take a 15 minute break every 2 hours to allow his back to relax and not spasm. \"    The letter can be found under letters, it was created on 6/28/22.

## 2023-03-29 ENCOUNTER — OFFICE VISIT (OUTPATIENT)
Dept: FAMILY MEDICINE CLINIC | Age: 73
End: 2023-03-29

## 2023-03-29 VITALS
HEIGHT: 72 IN | BODY MASS INDEX: 26.38 KG/M2 | OXYGEN SATURATION: 98 % | SYSTOLIC BLOOD PRESSURE: 92 MMHG | TEMPERATURE: 97 F | HEART RATE: 83 BPM | DIASTOLIC BLOOD PRESSURE: 60 MMHG | WEIGHT: 194.8 LBS

## 2023-03-29 DIAGNOSIS — E03.4 HYPOTHYROIDISM DUE TO ACQUIRED ATROPHY OF THYROID: Primary | ICD-10-CM

## 2023-03-29 DIAGNOSIS — E78.00 HYPERCHOLESTEROLEMIA: ICD-10-CM

## 2023-03-29 DIAGNOSIS — F34.1 PERSISTENT DEPRESSIVE DISORDER: ICD-10-CM

## 2023-03-29 DIAGNOSIS — M47.816 ARTHRITIS, LUMBAR SPINE: ICD-10-CM

## 2023-03-29 DIAGNOSIS — E03.4 HYPOTHYROIDISM DUE TO ACQUIRED ATROPHY OF THYROID: ICD-10-CM

## 2023-03-29 DIAGNOSIS — N18.30 STAGE 3 CHRONIC KIDNEY DISEASE, UNSPECIFIED WHETHER STAGE 3A OR 3B CKD (HCC): ICD-10-CM

## 2023-03-29 DIAGNOSIS — F41.9 ANXIETY: ICD-10-CM

## 2023-03-29 LAB
ALBUMIN SERPL-MCNC: 4.5 G/DL (ref 3.5–4.6)
ALP SERPL-CCNC: 100 U/L (ref 35–104)
ALT SERPL-CCNC: 23 U/L (ref 0–41)
ANION GAP SERPL CALCULATED.3IONS-SCNC: 10 MEQ/L (ref 9–15)
AST SERPL-CCNC: 23 U/L (ref 0–40)
BASOPHILS # BLD: 0 K/UL (ref 0–0.2)
BASOPHILS NFR BLD: 0.7 %
BILIRUB SERPL-MCNC: 0.5 MG/DL (ref 0.2–0.7)
BUN SERPL-MCNC: 15 MG/DL (ref 8–23)
CALCIUM SERPL-MCNC: 9.2 MG/DL (ref 8.5–9.9)
CHLORIDE SERPL-SCNC: 107 MEQ/L (ref 95–107)
CHOLEST SERPL-MCNC: 151 MG/DL (ref 0–199)
CO2 SERPL-SCNC: 27 MEQ/L (ref 20–31)
CREAT SERPL-MCNC: 1.39 MG/DL (ref 0.7–1.2)
EOSINOPHIL # BLD: 0.2 K/UL (ref 0–0.7)
EOSINOPHIL NFR BLD: 3.3 %
ERYTHROCYTE [DISTWIDTH] IN BLOOD BY AUTOMATED COUNT: 12.8 % (ref 11.5–14.5)
GLOBULIN SER CALC-MCNC: 2.1 G/DL (ref 2.3–3.5)
GLUCOSE FASTING: 83 MG/DL (ref 70–99)
HCT VFR BLD AUTO: 46.1 % (ref 42–52)
HDLC SERPL-MCNC: 44 MG/DL (ref 40–59)
HGB BLD-MCNC: 16 G/DL (ref 14–18)
LDL CHOLESTEROL CALCULATED: 91 MG/DL (ref 0–129)
LYMPHOCYTES # BLD: 0.7 K/UL (ref 1–4.8)
LYMPHOCYTES NFR BLD: 13 %
MCH RBC QN AUTO: 33.4 PG (ref 27–31.3)
MCHC RBC AUTO-ENTMCNC: 34.7 % (ref 33–37)
MCV RBC AUTO: 96.2 FL (ref 79–92.2)
MONOCYTES # BLD: 0.4 K/UL (ref 0.2–0.8)
MONOCYTES NFR BLD: 7.2 %
NEUTROPHILS # BLD: 4.4 K/UL (ref 1.4–6.5)
NEUTS SEG NFR BLD: 75.8 %
PLATELET # BLD AUTO: 205 K/UL (ref 130–400)
POTASSIUM SERPL-SCNC: 4.3 MEQ/L (ref 3.4–4.9)
PROT SERPL-MCNC: 6.6 G/DL (ref 6.3–8)
RBC # BLD AUTO: 4.79 M/UL (ref 4.7–6.1)
SODIUM SERPL-SCNC: 144 MEQ/L (ref 135–144)
TRIGLYCERIDE, FASTING: 80 MG/DL (ref 0–150)
TSH REFLEX: 1.32 UIU/ML (ref 0.44–3.86)
WBC # BLD AUTO: 5.7 K/UL (ref 4.8–10.8)

## 2023-03-29 RX ORDER — LEVOTHYROXINE SODIUM 88 UG/1
88 TABLET ORAL DAILY
Qty: 90 TABLET | Refills: 1 | Status: SHIPPED | OUTPATIENT
Start: 2023-03-29

## 2023-03-29 ASSESSMENT — ENCOUNTER SYMPTOMS
SORE THROAT: 0
ABDOMINAL PAIN: 0
VOMITING: 0
WHEEZING: 0
BACK PAIN: 1
COUGH: 0
RHINORRHEA: 0
EYE DISCHARGE: 0
DIARRHEA: 0
NAUSEA: 0
SHORTNESS OF BREATH: 0

## 2023-03-29 NOTE — PROGRESS NOTES
Subjective  Memo Altamirano, 74 y.o. male presents today with:  Chief Complaint   Patient presents with    6 Month Follow-Up    Depression     Does not feel mood is stable. Does feel down & depressed. Anxiety     Controlled at this time. Hypothyroidism    Hyperlipidemia    Chronic Kidney Disease    Back Pain     Back pain continues. Last xrays done several years ago. Has done PT in the past. Did see pain management & states he will not got back. Has never seen a spine DrCésar Barcenas colonoscopy, cologuard & FIT test.       Patient with 6-month follow-up appointment. Patient with hypothyroidism. He is on levothyroxine 88 mcg daily. Tolerating well. No side effects medication. No changes to hair skin or nails. No heat or cold intolerance. Patient also with depression and anxiety. He is on BuSpar 10 mg 3 times daily. Tolerating well. No side effects from medication. He feels that his mood is under good control. He feels that his anxiety is under good control. Patient also with hyperlipidemia. He is on atorvastatin 40 mg daily. Tolerating well. No side effects from medication. No muscle aches or pains. Patient also with back pain. He continues to have the back pain. He did see pain management. He had 2 injections done. He states it did not help as much as he wanted to. He is not sure if he wants a second opinion or not. He is going to think about it. He has no other concerns at this time. Review of Systems   Constitutional:  Negative for chills, fatigue, fever and unexpected weight change. HENT:  Negative for congestion, rhinorrhea and sore throat. Eyes:  Negative for discharge. Respiratory:  Negative for cough, shortness of breath and wheezing. Cardiovascular:  Negative for chest pain, palpitations and leg swelling. Gastrointestinal:  Negative for abdominal pain, diarrhea, nausea and vomiting.    Genitourinary:  Negative for

## 2023-04-05 PROBLEM — M96.1 FAILED BACK SYNDROME OF LUMBAR SPINE: Status: ACTIVE | Noted: 2023-04-05

## 2023-06-14 RX ORDER — ATORVASTATIN CALCIUM 40 MG/1
TABLET, FILM COATED ORAL
Qty: 90 TABLET | Refills: 3 | Status: SHIPPED | OUTPATIENT
Start: 2023-06-14 | End: 2023-06-16

## 2023-06-14 NOTE — TELEPHONE ENCOUNTER
Please approve or deny request. Thank you!     Rx requested:  Requested Prescriptions     Pending Prescriptions Disp Refills    atorvastatin (LIPITOR) 40 MG tablet [Pharmacy Med Name: Atorvastatin Calcium Oral Tablet 40 MG] 90 tablet 0     Sig: TAKE 1 TABLET BY MOUTH EVERY NIGHT         Last Office Visit:   3/29/2023      Next Visit Date:  Future Appointments   Date Time Provider [address]   8[facility]29/2023  2[facility]30 [facility]   [facility]/29[facility]2023  [facility]   8/31/2023  3:15 PM [facility]

## 2023-06-16 RX ORDER — ATORVASTATIN CALCIUM 40 MG/1
TABLET, FILM COATED ORAL
Qty: 90 TABLET | Refills: 3 | Status: SHIPPED | OUTPATIENT
Start: 2023-06-16

## 2023-08-02 RX ORDER — PRIMIDONE 250 MG/1
TABLET ORAL
Qty: 30 TABLET | Refills: 3 | Status: SHIPPED | OUTPATIENT
Start: 2023-08-02

## 2023-08-02 NOTE — TELEPHONE ENCOUNTER
Pharmacy is requesting medication refill.  Please approve or deny this request.    Rx requested:  Requested Prescriptions     Pending Prescriptions Disp Refills    primidone (MYSOLINE) 250 MG tablet [Pharmacy Med Name: Primidone 250 MG Oral Tablet] 30 tablet 3     Sig: Take 1 tablet by mouth nightly         Last Office Visit:   4/5/2023      Next Visit Date:  Future Appointments   Date Time Provider [address]   8/29/2023  2:30 PM Susanna [facility]   8/29/2023  [facility]   8/31/2023  3:15 PM MD Karma Brewer Neurology -

## 2023-08-29 ENCOUNTER — OFFICE VISIT (OUTPATIENT)
Dept: FAMILY MEDICINE CLINIC | Age: 73
End: 2023-08-29
Payer: MEDICARE

## 2023-08-29 VITALS
TEMPERATURE: 97.4 F | SYSTOLIC BLOOD PRESSURE: 112 MMHG | OXYGEN SATURATION: 95 % | BODY MASS INDEX: 27.17 KG/M2 | WEIGHT: 189.8 LBS | HEART RATE: 75 BPM | HEIGHT: 70 IN | DIASTOLIC BLOOD PRESSURE: 74 MMHG

## 2023-08-29 VITALS
HEIGHT: 70 IN | SYSTOLIC BLOOD PRESSURE: 112 MMHG | WEIGHT: 189.8 LBS | BODY MASS INDEX: 27.17 KG/M2 | OXYGEN SATURATION: 95 % | DIASTOLIC BLOOD PRESSURE: 74 MMHG | TEMPERATURE: 97.4 F | HEART RATE: 75 BPM

## 2023-08-29 DIAGNOSIS — F41.9 ANXIETY: ICD-10-CM

## 2023-08-29 DIAGNOSIS — E03.4 HYPOTHYROIDISM DUE TO ACQUIRED ATROPHY OF THYROID: Primary | ICD-10-CM

## 2023-08-29 DIAGNOSIS — N18.30 STAGE 3 CHRONIC KIDNEY DISEASE, UNSPECIFIED WHETHER STAGE 3A OR 3B CKD (HCC): ICD-10-CM

## 2023-08-29 DIAGNOSIS — F34.1 PERSISTENT DEPRESSIVE DISORDER: ICD-10-CM

## 2023-08-29 DIAGNOSIS — M47.816 ARTHRITIS, LUMBAR SPINE: ICD-10-CM

## 2023-08-29 DIAGNOSIS — M51.36 DEGENERATIVE DISC DISEASE, LUMBAR: ICD-10-CM

## 2023-08-29 DIAGNOSIS — Z00.00 MEDICARE ANNUAL WELLNESS VISIT, SUBSEQUENT: Primary | ICD-10-CM

## 2023-08-29 DIAGNOSIS — M54.16 LUMBAR RADICULOPATHY: ICD-10-CM

## 2023-08-29 PROBLEM — M51.369 DEGENERATIVE DISC DISEASE, LUMBAR: Status: ACTIVE | Noted: 2023-08-29

## 2023-08-29 PROCEDURE — 3017F COLORECTAL CA SCREEN DOC REV: CPT | Performed by: STUDENT IN AN ORGANIZED HEALTH CARE EDUCATION/TRAINING PROGRAM

## 2023-08-29 PROCEDURE — G8427 DOCREV CUR MEDS BY ELIG CLIN: HCPCS | Performed by: STUDENT IN AN ORGANIZED HEALTH CARE EDUCATION/TRAINING PROGRAM

## 2023-08-29 PROCEDURE — G0439 PPPS, SUBSEQ VISIT: HCPCS | Performed by: STUDENT IN AN ORGANIZED HEALTH CARE EDUCATION/TRAINING PROGRAM

## 2023-08-29 PROCEDURE — 1123F ACP DISCUSS/DSCN MKR DOCD: CPT | Performed by: STUDENT IN AN ORGANIZED HEALTH CARE EDUCATION/TRAINING PROGRAM

## 2023-08-29 PROCEDURE — 1036F TOBACCO NON-USER: CPT | Performed by: STUDENT IN AN ORGANIZED HEALTH CARE EDUCATION/TRAINING PROGRAM

## 2023-08-29 PROCEDURE — G8417 CALC BMI ABV UP PARAM F/U: HCPCS | Performed by: STUDENT IN AN ORGANIZED HEALTH CARE EDUCATION/TRAINING PROGRAM

## 2023-08-29 PROCEDURE — 99214 OFFICE O/P EST MOD 30 MIN: CPT | Performed by: STUDENT IN AN ORGANIZED HEALTH CARE EDUCATION/TRAINING PROGRAM

## 2023-08-29 RX ORDER — DULOXETIN HYDROCHLORIDE 30 MG/1
30 CAPSULE, DELAYED RELEASE ORAL DAILY
Qty: 30 CAPSULE | Refills: 2 | Status: SHIPPED | OUTPATIENT
Start: 2023-08-29

## 2023-08-29 RX ORDER — HYDROCODONE BITARTRATE AND ACETAMINOPHEN 7.5; 325 MG/1; MG/1
1 TABLET ORAL EVERY 6 HOURS PRN
COMMUNITY

## 2023-08-29 RX ORDER — LEVOTHYROXINE SODIUM 88 UG/1
88 TABLET ORAL DAILY
Qty: 90 TABLET | Refills: 1 | Status: SHIPPED | OUTPATIENT
Start: 2023-08-29

## 2023-08-29 ASSESSMENT — PATIENT HEALTH QUESTIONNAIRE - PHQ9
5. POOR APPETITE OR OVEREATING: 0
SUM OF ALL RESPONSES TO PHQ QUESTIONS 1-9: 9
6. FEELING BAD ABOUT YOURSELF - OR THAT YOU ARE A FAILURE OR HAVE LET YOURSELF OR YOUR FAMILY DOWN: 1
SUM OF ALL RESPONSES TO PHQ QUESTIONS 1-9: 9
1. LITTLE INTEREST OR PLEASURE IN DOING THINGS: 1
3. TROUBLE FALLING OR STAYING ASLEEP: 2
7. TROUBLE CONCENTRATING ON THINGS, SUCH AS READING THE NEWSPAPER OR WATCHING TELEVISION: 0
8. MOVING OR SPEAKING SO SLOWLY THAT OTHER PEOPLE COULD HAVE NOTICED. OR THE OPPOSITE, BEING SO FIGETY OR RESTLESS THAT YOU HAVE BEEN MOVING AROUND A LOT MORE THAN USUAL: 1
SUM OF ALL RESPONSES TO PHQ QUESTIONS 1-9: 8
4. FEELING TIRED OR HAVING LITTLE ENERGY: 2
SUM OF ALL RESPONSES TO PHQ9 QUESTIONS 1 & 2: 2
2. FEELING DOWN, DEPRESSED OR HOPELESS: 1
SUM OF ALL RESPONSES TO PHQ QUESTIONS 1-9: 9
9. THOUGHTS THAT YOU WOULD BE BETTER OFF DEAD, OR OF HURTING YOURSELF: 1
10. IF YOU CHECKED OFF ANY PROBLEMS, HOW DIFFICULT HAVE THESE PROBLEMS MADE IT FOR YOU TO DO YOUR WORK, TAKE CARE OF THINGS AT HOME, OR GET ALONG WITH OTHER PEOPLE: 0

## 2023-08-29 ASSESSMENT — ENCOUNTER SYMPTOMS
WHEEZING: 0
COUGH: 0
DIARRHEA: 0
ABDOMINAL PAIN: 0
EYE DISCHARGE: 0
BACK PAIN: 1
RHINORRHEA: 0
SHORTNESS OF BREATH: 0
VOMITING: 0
SORE THROAT: 0
NAUSEA: 0

## 2023-08-29 ASSESSMENT — COLUMBIA-SUICIDE SEVERITY RATING SCALE - C-SSRS
1. WITHIN THE PAST MONTH, HAVE YOU WISHED YOU WERE DEAD OR WISHED YOU COULD GO TO SLEEP AND NOT WAKE UP?: YES
6. HAVE YOU EVER DONE ANYTHING, STARTED TO DO ANYTHING, OR PREPARED TO DO ANYTHING TO END YOUR LIFE?: NO
2. HAVE YOU ACTUALLY HAD ANY THOUGHTS OF KILLING YOURSELF?: NO

## 2023-08-29 ASSESSMENT — LIFESTYLE VARIABLES
HOW MANY STANDARD DRINKS CONTAINING ALCOHOL DO YOU HAVE ON A TYPICAL DAY: PATIENT DOES NOT DRINK
HOW OFTEN DO YOU HAVE A DRINK CONTAINING ALCOHOL: NEVER

## 2023-08-29 NOTE — PROGRESS NOTES
numbness and tingling. No loss of bowel or bladder function. He has no other concerns at this time. Review of Systems   Constitutional:  Negative for chills, fatigue, fever and unexpected weight change. HENT:  Negative for congestion, rhinorrhea and sore throat. Eyes:  Negative for discharge. Respiratory:  Negative for cough, shortness of breath and wheezing. Cardiovascular:  Negative for chest pain, palpitations and leg swelling. Gastrointestinal:  Negative for abdominal pain, diarrhea, nausea and vomiting. Genitourinary:  Negative for difficulty urinating. Musculoskeletal:  Positive for back pain. Negative for arthralgias and joint swelling. Skin:  Negative for rash. Neurological:  Negative for weakness, light-headedness, numbness and headaches. Psychiatric/Behavioral:  Negative for confusion and suicidal ideas. The patient is not nervous/anxious.       Past Medical History:   Diagnosis Date    Anemia     Anxiety, generalized     BPH (benign prostatic hyperplasia)     CRF (chronic renal failure)     stage 3 -   5/24/17 patient denies    Depression     Has a tremor     HIGH CHOLESTEROL     Hypothyroid     IBD (inflammatory bowel disease)     Insomnia     Kidney stones     Seasonal allergic conjunctivitis     Tinnitus     chronic      Past Surgical History:   Procedure Laterality Date    CATARACT REMOVAL Bilateral     COLONOSCOPY      KNEE SURGERY      Bilat- knee,legs    [place] COLON [place] SCRN NOT [facility] IND N/A 05/24/2017    COLONOSCOPY performed by Sierra Moore MD at [address] [facility] shoulder repair    THYROID SURGERY  1999     Current Outpatient Medications   Medication Sig Dispense Refill    HYDROcodone-acetaminophen ([address]) 7.5-325 MG per tablet Take 1 tablet by mouth every 6 hours as needed for Pain.      levothyroxine (SYNTHROID) 88 MCG tablet Take 1 tablet by mouth daily 90 tablet 1    DULoxetine (CYMBALTA) 30 MG extended release capsule Take 1

## 2023-08-29 NOTE — PATIENT INSTRUCTIONS
Learning About Mindfulness for Stress  What are mindfulness and stress? Stress is your body's response to a hard situation. Your body can have a physical, emotional, or mental response. A lot of things can cause stress. You may feel stress when you go on a job interview, take a test, or run a race. This kind of short-term stress is normal and even useful. It can help you if you need to work hard or react quickly. Stress also can last a long time. Long-term stress is caused by stressful situations or events. Examples of long-term stress include long-term health problems, ongoing problems at work, and conflicts in your family. Long-term stress can harm your health. Mindfulness is a focus only on things happening in the present moment. It's a process of purposefully paying attention to and being aware of your surroundings, your emotions, your thoughts, and how your body feels. You are aware of these things, but you aren't judging these experiences as \"good\" or \"bad. \" Mindfulness can help you learn to calm your mind and body to help you cope with illness, pain, and stress. How does mindfulness help to relieve stress? Mindfulness can help quiet your mind and relax your body. Studies show that it can help some people sleep better, feel less anxious, and bring their blood pressure down. And it's been shown to help some people live and cope better with certain health problems like heart disease, depression, chronic pain, and cancer. How do you practice mindfulness? To be mindful is to pay attention, to be present, and to be accepting. Like any new skill or habit, being mindful can take practice. When you're mindful, you do just one thing and you pay close attention to that one thing. For example, you may sit quietly and notice your emotions or how your food tastes and smells. When you're present, you focus on the things that are happening right now. You let go of your thoughts about the past and the future. 160.9

## 2023-08-29 NOTE — PROGRESS NOTES
Medicare Annual Wellness Visit    John Lane is here for Medicare AWV    Assessment & Plan   Medicare annual wellness visit, subsequent  Recommendations for Preventive Services Due: see orders and patient instructions/AVS.  Recommended screening schedule for the next 5-10 years is provided to the patient in written form: see Patient Instructions/AVS.     No follow-ups on file. Subjective       Patient's complete Health Risk Assessment and screening values have been reviewed and are found in Flowsheets. The following problems were reviewed today and where indicated follow up appointments were made and/or referrals ordered. Positive Risk Factor Screenings with Interventions:        Depression:  PHQ-2 Score: 2  PHQ-9 Total Score: 9    Interpretation:   1-4 = minimal  5-9 = mild  10-14 = moderate  15-19 = moderately severe  20-27 = severe  Interventions:  Medications adjusted: added cymbalta           Opioid Risk: (Low risk score <55) Opioid risk score: 11    Patient is low risk for opioid use disorder or overdose. Last PDMP Nicolás as Reviewed:  Review User Review Instant Review Result   OSCAR ZARATE 8/2/2023 11:09 AM     Reviewed PDMP [1]     Last Controlled Substance Monitoring Documentation      Flowsheet Row Refill from 8/2/2023 in [facility] Neurology   Periodic Controlled Substance Monitoring No signs of potential drug abuse or diversion identified. filed at 08/02/2023 1105   Chronic Pain > 50 MEDD Re-evaluated the status of the patient's underlying condition causing pain.  filed at 08/02/2023 1109              General HRA Questions:  Select all that apply: (!) Stress, Anger    Stress Interventions:  Patient declined any further interventions or treatment    Anger Interventions:  Patient declined any further interventions or treatment       Weight and Activity:  Physical Activity: Inactive    Days of Exercise per Week: 0 days    Minutes of Exercise per Session: 0 min     On average, how

## 2023-08-31 ENCOUNTER — OFFICE VISIT (OUTPATIENT)
Dept: NEUROLOGY | Age: 73
End: 2023-08-31
Payer: MEDICARE

## 2023-08-31 VITALS
BODY MASS INDEX: 27.38 KG/M2 | SYSTOLIC BLOOD PRESSURE: 110 MMHG | WEIGHT: 192.2 LBS | HEART RATE: 80 BPM | DIASTOLIC BLOOD PRESSURE: 64 MMHG

## 2023-08-31 DIAGNOSIS — G25.0 ESSENTIAL TREMOR: Primary | ICD-10-CM

## 2023-08-31 DIAGNOSIS — M54.16 LUMBAR RADICULOPATHY: ICD-10-CM

## 2023-08-31 DIAGNOSIS — M48.062 SPINAL STENOSIS OF LUMBAR REGION WITH NEUROGENIC CLAUDICATION: ICD-10-CM

## 2023-08-31 PROCEDURE — 1123F ACP DISCUSS/DSCN MKR DOCD: CPT | Performed by: PSYCHIATRY & NEUROLOGY

## 2023-08-31 PROCEDURE — 3017F COLORECTAL CA SCREEN DOC REV: CPT | Performed by: PSYCHIATRY & NEUROLOGY

## 2023-08-31 PROCEDURE — 1036F TOBACCO NON-USER: CPT | Performed by: PSYCHIATRY & NEUROLOGY

## 2023-08-31 PROCEDURE — 99214 OFFICE O/P EST MOD 30 MIN: CPT | Performed by: PSYCHIATRY & NEUROLOGY

## 2023-08-31 PROCEDURE — G8417 CALC BMI ABV UP PARAM F/U: HCPCS | Performed by: PSYCHIATRY & NEUROLOGY

## 2023-08-31 PROCEDURE — G8427 DOCREV CUR MEDS BY ELIG CLIN: HCPCS | Performed by: PSYCHIATRY & NEUROLOGY

## 2023-08-31 ASSESSMENT — ENCOUNTER SYMPTOMS
TROUBLE SWALLOWING: 0
NAUSEA: 0
CHOKING: 0
BACK PAIN: 0
SHORTNESS OF BREATH: 0
COLOR CHANGE: 0
VOMITING: 0
PHOTOPHOBIA: 0

## 2023-08-31 NOTE — PROGRESS NOTES
Subjective:      Patient ID: Sasha Loyola is a 74 y.o. male who presents today for:  Chief Complaint   Patient presents with    Follow-up     Pt states that he is doing okay. He states that he has had more good then bad days. HPI 68-year-old right-handed gentleman with a history of tremors. Patient is on Mysoline 150 mg tolerating well. He has not had parkinson's disease. His major back issues and has extensive relations including MRI thoracic and lumbar spine. He continues on Zanaflex. Has good days and bad days  She is doing much better and he is on antidepressant a follow-up MRI has been ordered. Gave him a few tablets of hydrocodone in April and he still has some left which is good as he is only taking it as and when required.     Past Medical History:   Diagnosis Date    Anemia     Anxiety, generalized     BPH (benign prostatic hyperplasia)     CRF (chronic renal failure)     stage 3 -   17 patient denies    Depression     Has a tremor     HIGH CHOLESTEROL     Hypothyroid     IBD (inflammatory bowel disease)     Insomnia     Kidney stones     Seasonal allergic conjunctivitis     Tinnitus     chronic      Past Surgical History:   Procedure Laterality Date    CATARACT REMOVAL Bilateral     COLONOSCOPY      KNEE SURGERY      Bilat- knee,legs    [place] COLON [place] SCRN NOT [place] [address] IND N/A 2017    COLONOSCOPY performed by Mariella Franklin MD at [address] [facility] shoulder repair    THYROID SURGERY       Social History     Socioeconomic History    Marital status:      Spouse name: Liberty Foster    Number of children: Not on file    Years of education: Not on file    Highest education level: Not on file   Occupational History    Not on file   Tobacco Use    Smoking status: Former     Packs/day: 0.50     Years: 30.00     Pack years: 15.00     Types: Pipe, Cigarettes     Quit date: 2002     Years since quittin.6    Smokeless tobacco: Never   Vaping

## 2023-09-07 ENCOUNTER — HOSPITAL ENCOUNTER (OUTPATIENT)
Dept: MRI IMAGING | Age: 73
Discharge: HOME | End: 2023-09-09
Payer: MEDICARE

## 2023-09-07 DIAGNOSIS — M51.36 DEGENERATIVE DISC DISEASE, LUMBAR: ICD-10-CM

## 2023-09-07 DIAGNOSIS — M54.16 LUMBAR RADICULOPATHY: ICD-10-CM

## 2023-09-07 DIAGNOSIS — M47.816 ARTHRITIS, LUMBAR SPINE: ICD-10-CM

## 2023-09-07 PROCEDURE — 72148 MRI LUMBAR SPINE W/O DYE: CPT

## 2023-09-29 ENCOUNTER — NURSE ONLY (OUTPATIENT)
Dept: FAMILY MEDICINE CLINIC | Age: 73
End: 2023-09-29

## 2023-09-29 DIAGNOSIS — Z23 NEED FOR INFLUENZA VACCINATION: Primary | ICD-10-CM

## 2023-10-10 ENCOUNTER — OFFICE VISIT (OUTPATIENT)
Dept: FAMILY MEDICINE CLINIC | Age: 73
End: 2023-10-10

## 2023-10-10 VITALS
TEMPERATURE: 96.5 F | WEIGHT: 191 LBS | BODY MASS INDEX: 27.35 KG/M2 | DIASTOLIC BLOOD PRESSURE: 72 MMHG | OXYGEN SATURATION: 97 % | SYSTOLIC BLOOD PRESSURE: 110 MMHG | HEART RATE: 72 BPM | HEIGHT: 70 IN

## 2023-10-10 DIAGNOSIS — M48.061 NEURAL FORAMINAL STENOSIS OF LUMBAR SPINE: ICD-10-CM

## 2023-10-10 DIAGNOSIS — F34.1 PERSISTENT DEPRESSIVE DISORDER: ICD-10-CM

## 2023-10-10 DIAGNOSIS — M51.36 DEGENERATIVE DISC DISEASE, LUMBAR: ICD-10-CM

## 2023-10-10 DIAGNOSIS — L84 CORNS AND CALLOSITIES: ICD-10-CM

## 2023-10-10 DIAGNOSIS — M54.16 LUMBAR RADICULOPATHY: ICD-10-CM

## 2023-10-10 DIAGNOSIS — M47.816 LUMBAR SPONDYLOSIS: Primary | ICD-10-CM

## 2023-10-10 NOTE — PROGRESS NOTES
Subjective  Jason Jansen, 74 y.o. male presents today with:  Chief Complaint   Patient presents with    Follow-up    Depression     Does feel like medication is helping, but has had some diarrhea & abd cramping. States these both have subsided some since starting the medication, but are still present. Health Maintenance     Refuses colonoscopy, cologuard & FIT test.       Patient with follow-up appointment. Patient following up on recent MRI imaging. He was found to have lumbar spondylosis, neuroforaminal stenosis and lumbar radiculopathy along with degenerative disc disease. He states that he continues to have pain in his low back. He does get numbness and tingling to his legs. He previously saw pain management and would like a new referral to pain management to see a different physician. No loss of bowel or bladder function. No saddle anesthesia. Patient also following up on persistent depressive disorder. He is doing better since starting on the Cymbalta. He is currently on 30 mg daily. Working extremely well. He did have some diarrhea and abdominal cramping with that but that has subsided. No suicidal or homicidal ideation. He states that he is overall doing a lot better since starting on medication. Does not feel like he needs to go up on the medication at this time. Patient also with calluses and corn of his feet. He would like to see podiatry for this. Requesting referral.  No other concerns at this time. Review of Systems   Constitutional:  Negative for chills, fatigue, fever and unexpected weight change. HENT:  Negative for congestion, rhinorrhea and sore throat. Eyes:  Negative for discharge. Respiratory:  Negative for cough, shortness of breath and wheezing. Cardiovascular:  Negative for chest pain, palpitations and leg swelling. Gastrointestinal:  Negative for abdominal pain, diarrhea, nausea and vomiting.    Genitourinary:  Negative for difficulty

## 2023-10-13 ASSESSMENT — ENCOUNTER SYMPTOMS
SORE THROAT: 0
COUGH: 0
DIARRHEA: 0
NAUSEA: 0
SHORTNESS OF BREATH: 0
ABDOMINAL PAIN: 0
RHINORRHEA: 0
WHEEZING: 0
BACK PAIN: 1
EYE DISCHARGE: 0
VOMITING: 0

## 2023-10-30 ENCOUNTER — INITIAL CONSULT (OUTPATIENT)
Dept: PAIN MANAGEMENT | Age: 73
End: 2023-10-30
Payer: MEDICARE

## 2023-10-30 VITALS
SYSTOLIC BLOOD PRESSURE: 112 MMHG | TEMPERATURE: 97.8 F | HEIGHT: 72 IN | BODY MASS INDEX: 26.14 KG/M2 | WEIGHT: 193 LBS | DIASTOLIC BLOOD PRESSURE: 68 MMHG

## 2023-10-30 DIAGNOSIS — M47.817 LUMBOSACRAL SPONDYLOSIS WITHOUT MYELOPATHY: ICD-10-CM

## 2023-10-30 DIAGNOSIS — M43.07 SPONDYLOLYSIS OF LUMBOSACRAL REGION: Primary | ICD-10-CM

## 2023-10-30 DIAGNOSIS — M47.814 THORACIC SPONDYLOSIS: ICD-10-CM

## 2023-10-30 DIAGNOSIS — M79.604 RIGHT LEG PAIN: ICD-10-CM

## 2023-10-30 DIAGNOSIS — G58.8 CLUNEAL NEUROPATHY: ICD-10-CM

## 2023-10-30 PROCEDURE — 1123F ACP DISCUSS/DSCN MKR DOCD: CPT | Performed by: PHYSICAL MEDICINE & REHABILITATION

## 2023-10-30 PROCEDURE — G8417 CALC BMI ABV UP PARAM F/U: HCPCS | Performed by: PHYSICAL MEDICINE & REHABILITATION

## 2023-10-30 PROCEDURE — 1036F TOBACCO NON-USER: CPT | Performed by: PHYSICAL MEDICINE & REHABILITATION

## 2023-10-30 PROCEDURE — G8427 DOCREV CUR MEDS BY ELIG CLIN: HCPCS | Performed by: PHYSICAL MEDICINE & REHABILITATION

## 2023-10-30 PROCEDURE — G8484 FLU IMMUNIZE NO ADMIN: HCPCS | Performed by: PHYSICAL MEDICINE & REHABILITATION

## 2023-10-30 PROCEDURE — 3017F COLORECTAL CA SCREEN DOC REV: CPT | Performed by: PHYSICAL MEDICINE & REHABILITATION

## 2023-10-30 PROCEDURE — 99204 OFFICE O/P NEW MOD 45 MIN: CPT | Performed by: PHYSICAL MEDICINE & REHABILITATION

## 2023-10-30 RX ORDER — LIDOCAINE 40 MG/G
CREAM TOPICAL
Qty: 45 G | Refills: 1 | Status: SHIPPED | OUTPATIENT
Start: 2023-10-30

## 2023-10-30 RX ORDER — TIZANIDINE 2 MG/1
2 TABLET ORAL EVERY EVENING
Qty: 7 TABLET | Refills: 0 | Status: SHIPPED | OUTPATIENT
Start: 2023-10-30 | End: 2023-11-06

## 2023-10-30 ASSESSMENT — ENCOUNTER SYMPTOMS
CONSTIPATION: 0
DIARRHEA: 0
NAUSEA: 0
SHORTNESS OF BREATH: 0
BACK PAIN: 1

## 2023-10-30 NOTE — PATIENT INSTRUCTIONS
Follow up with primary care team/Urology for calcifications that may be renal calculi seen on XR T Spine 3/11/21.

## 2023-10-30 NOTE — PROGRESS NOTES
Encouraged him to follow-up with his primary care physician and/or specialists as required for his overall health and management of his comorbidities as well as any new positive symptoms mentioned in review of systems above. Care was provided within the definitions and limitations of our specialty practice. Encouraged lifestyle interventions including healthy habits, lifestyle changes, regular aerobic exercise and appropriate weight maintenance as advised by their primary care physician or cardiovascular health provider. Discussed well care and disease prevention/maintenance. Anatomic spine model was used to illustrate pathology. All recommendations for therapy are provided to improve function with activities of daily living, decrease pain, and help develop an exercise program. All recommendations for medications are meant to help decrease pain, improve function with activities of daily living, maintain compliance with home exercise program, and improve quality of life. All recommendations for therapeutic injections are meant to help decrease pain, improve function with activities of daily living, maintain compliance with home exercise program, improve quality of life, and decrease reliance upon oral medications. All recommendations for diagnostic injections are meant to help assess the hypothesis that the targeted structure is a significant pain generator that limits the patient's function, causes pain, and reduces his quality of life. Encouraged compliance with his home exercise program. Recommended compliance with physical therapy program as outlined above. Discussed the elevated risks of excessive sedation while on pain medications. Advised him against driving or operating heavy machinery or performing any activities where he may harm himself or others while on pain medications. Particular caution was emphasized especially during dose adjustments and medication changes.  Discussed the elevated risks of

## 2023-11-01 ENCOUNTER — TELEPHONE (OUTPATIENT)
Dept: NEUROLOGY | Age: 73
End: 2023-11-01

## 2023-11-01 ENCOUNTER — HOSPITAL ENCOUNTER (OUTPATIENT)
Dept: GENERAL RADIOLOGY | Age: 73
Discharge: HOME | End: 2023-11-03
Attending: PHYSICAL MEDICINE & REHABILITATION
Payer: MEDICARE

## 2023-11-01 DIAGNOSIS — M47.814 THORACIC SPONDYLOSIS: ICD-10-CM

## 2023-11-01 DIAGNOSIS — M43.07 SPONDYLOLYSIS OF LUMBOSACRAL REGION: ICD-10-CM

## 2023-11-01 PROCEDURE — 72120 X-RAY BEND ONLY L-S SPINE: CPT

## 2023-11-01 PROCEDURE — 72072 X-RAY EXAM THORAC SPINE 3VWS: CPT

## 2023-11-01 NOTE — TELEPHONE ENCOUNTER
Patient was prescribed Norco 7.5-325 to take PRN for his back pain, he truly only takes as need, was given 30 tabs 4/5/2023 and states that it is taking 1-2 hours for it to kick in once he does take it. He wants to know if the dose can be increased to ? Please advise.

## 2023-11-01 NOTE — TELEPHONE ENCOUNTER
Patient is requesting medication refill.  Please approve or deny this request.    Rx requested:  Requested Prescriptions     Pending Prescriptions Disp Refills    busPIRone (BUSPAR) 10 MG tablet 270 tablet 1     Sig: Take 1 tablet by mouth 3 times daily         Last Office Visit:   8/31/2023      Next Visit Date:  Future Appointments   Date Time Provider 4600  [address]   11/15/2023  3:00 PM MD EKTA Contreras [facility] AT [place]   11/16/2023  3:00 PM MD EKTA Contreras [facility] AT [place]   11/28/2023  3:00 PM MD EKTA Contreras [facility] AT [place]   12/1/2023  3:00 PM Bety Chiang DPM MLOX AVN POD [facility] AT [place]   1/10/2024  2:30 PM Kobe Coon DO MLOX [facility] [address]   2/29/2024  4:15 PM MD Mariano Talley [facility] -

## 2023-11-02 RX ORDER — BUSPIRONE HYDROCHLORIDE 10 MG/1
10 TABLET ORAL 3 TIMES DAILY
Qty: 270 TABLET | Refills: 1 | Status: SHIPPED | OUTPATIENT
Start: 2023-11-02

## 2023-11-03 DIAGNOSIS — M96.1 FAILED BACK SYNDROME OF LUMBAR SPINE: Primary | ICD-10-CM

## 2023-11-03 RX ORDER — HYDROCODONE BITARTRATE AND ACETAMINOPHEN 10; 325 MG/1; MG/1
1 TABLET ORAL DAILY PRN
Qty: 30 TABLET | Refills: 0 | Status: SHIPPED | OUTPATIENT
Start: 2023-11-03 | End: 2023-11-06 | Stop reason: SDUPTHER

## 2023-11-03 NOTE — TELEPHONE ENCOUNTER
Patient is requesting medication refill. Please approve or deny this request.    Rx requested:  Requested Prescriptions     Pending Prescriptions Disp Refills    HYDROcodone-acetaminophen (NORCO)  MG per tablet 30 tablet 0     Sig: Take 1 tablet by mouth daily as needed for Pain for up to 30 days.  Intended supply: 30 days Max Daily Amount: 1 tablet         Last Office Visit:   8/31/2023      Next Visit Date:  Future Appointments   Date Time Provider [address]   11/15/2023  3:00 PM MD EKTA Shrestha [facility] AT [place]   11/16/2023  3:00 PM MD EKTA Shrestha [facility] AT [place]   11/17/2023  4:00 PM Dayanara Ramirez, 22 [facility] [place] [facility] [address]   11/28/2023  3:00 PM MD EKTA Shrestha [facility] AT [place]   12/1/2023  3:00 PM ERMIAS MulliganM MLOX AVN POD [facility] AT [place]   1/10/2024  2:30 PM Gala Christianson,  MLOX [facility] [address]   2/29/2024  4:15 PM MD Brianna Lange Neurology -

## 2023-11-06 ENCOUNTER — TELEPHONE (OUTPATIENT)
Dept: NEUROLOGY | Age: 73
End: 2023-11-06

## 2023-11-06 DIAGNOSIS — M96.1 FAILED BACK SYNDROME OF LUMBAR SPINE: ICD-10-CM

## 2023-11-06 RX ORDER — HYDROCODONE BITARTRATE AND ACETAMINOPHEN 10; 325 MG/1; MG/1
1 TABLET ORAL DAILY PRN
Qty: 30 TABLET | Refills: 0 | Status: SHIPPED | OUTPATIENT
Start: 2023-11-06 | End: 2023-12-06

## 2023-11-06 NOTE — TELEPHONE ENCOUNTER
Per telephone encounter Shanelle Lai is out and needs sent to jill    Rx requested:  Requested Prescriptions     Pending Prescriptions Disp Refills    HYDROcodone-acetaminophen (NORCO)  MG per tablet 30 tablet 0     Sig: Take 1 tablet by mouth daily as needed for Pain for up to 30 days.  Intended supply: 30 days Max Daily Amount: 1 tablet         Last Office Visit:   8/31/2023      Next Visit Date:  Future Appointments   Date Time Provider 4600  [address]   11/15/2023  3:00 PM MD EKTA Angel [facility] EMERGENCY [facility] AT [place]   11/16/2023  3:00 PM MD EKTA Angel [facility] AT [place]   11/17/2023  4:00 PM Sabi Warner, 22 [facility] [place] EM[place] [address]   11/28/2023  3:00 PM MD EKTA Angel [facility] AT [place]   12/1/2023  3:00 PM Liberty Wilcox DPM MLOX AVN POD [facility] EMERGENCY [facility] AT [place]   1/10/2024  2:30 PM Nazario Murray DO MLOX [facility] [address]   2/29/2024  4:15 PM MD Kash John Neurology -

## 2023-11-06 NOTE — TELEPHONE ENCOUNTER
[address] [place] [address] called in to request we resend the hydrocodone  to [facility]. The patient is able to use a discount card there.

## 2023-11-13 ENCOUNTER — OFFICE VISIT (OUTPATIENT)
Dept: FAMILY MEDICINE CLINIC | Age: 73
End: 2023-11-13
Payer: MEDICARE

## 2023-11-13 VITALS
HEIGHT: 72 IN | DIASTOLIC BLOOD PRESSURE: 62 MMHG | SYSTOLIC BLOOD PRESSURE: 110 MMHG | WEIGHT: 193 LBS | OXYGEN SATURATION: 96 % | TEMPERATURE: 97.3 F | BODY MASS INDEX: 26.14 KG/M2 | HEART RATE: 80 BPM

## 2023-11-13 DIAGNOSIS — L03.213 PERIORBITAL CELLULITIS OF LEFT EYE: ICD-10-CM

## 2023-11-13 DIAGNOSIS — H10.32 ACUTE BACTERIAL CONJUNCTIVITIS OF LEFT EYE: Primary | ICD-10-CM

## 2023-11-13 PROCEDURE — 1123F ACP DISCUSS/DSCN MKR DOCD: CPT | Performed by: NURSE PRACTITIONER

## 2023-11-13 PROCEDURE — G8427 DOCREV CUR MEDS BY ELIG CLIN: HCPCS | Performed by: NURSE PRACTITIONER

## 2023-11-13 PROCEDURE — G8484 FLU IMMUNIZE NO ADMIN: HCPCS | Performed by: NURSE PRACTITIONER

## 2023-11-13 PROCEDURE — 1036F TOBACCO NON-USER: CPT | Performed by: NURSE PRACTITIONER

## 2023-11-13 PROCEDURE — 3017F COLORECTAL CA SCREEN DOC REV: CPT | Performed by: NURSE PRACTITIONER

## 2023-11-13 PROCEDURE — 99213 OFFICE O/P EST LOW 20 MIN: CPT | Performed by: NURSE PRACTITIONER

## 2023-11-13 PROCEDURE — G8417 CALC BMI ABV UP PARAM F/U: HCPCS | Performed by: NURSE PRACTITIONER

## 2023-11-13 RX ORDER — ERYTHROMYCIN 5 MG/G
OINTMENT OPHTHALMIC
Qty: 1 EACH | Refills: 0 | Status: SHIPPED | OUTPATIENT
Start: 2023-11-13 | End: 2023-11-23

## 2023-11-13 RX ORDER — CEPHALEXIN 500 MG/1
500 CAPSULE ORAL 2 TIMES DAILY
Qty: 14 CAPSULE | Refills: 0 | Status: SHIPPED | OUTPATIENT
Start: 2023-11-13 | End: 2023-11-20

## 2023-11-13 ASSESSMENT — ENCOUNTER SYMPTOMS
PHOTOPHOBIA: 0
EYE ITCHING: 1
RHINORRHEA: 0
COUGH: 0
SORE THROAT: 0
VOMITING: 0
DIARRHEA: 0
NAUSEA: 0
SHORTNESS OF BREATH: 0
EYE DISCHARGE: 1
EYE PAIN: 1
EYE REDNESS: 1

## 2023-11-13 NOTE — PROGRESS NOTES
canal and external ear normal.      Left Ear: Hearing, tympanic membrane, ear canal and external ear normal.      Nose: Nose normal.      Mouth/Throat:      Lips: Pink. Mouth: Mucous membranes are moist.      Pharynx: Oropharynx is clear. Eyes:      General: No scleral icterus. Right eye: No discharge. Left eye: Discharge present. No foreign body or hordeolum. Extraocular Movements: Extraocular movements intact. Left eye: Normal extraocular motion and no nystagmus. Conjunctiva/sclera:      Left eye: Left conjunctiva is injected. Exudate present. Pupils: Pupils are equal, round, and reactive to light. Comments: Periorbital area pink and swollen, denies pain. Watery and thick green drainage present. No vision changes. Cardiovascular:      Rate and Rhythm: Normal rate and regular rhythm. Pulses: Normal pulses. Heart sounds: Normal heart sounds, S1 normal and S2 normal.   Pulmonary:      Effort: Pulmonary effort is normal.      Breath sounds: Normal breath sounds and air entry. Musculoskeletal:         General: Normal range of motion. Cervical back: Normal range of motion and neck supple. Skin:     General: Skin is warm and dry. Capillary Refill: Capillary refill takes less than 2 seconds. Neurological:      General: No focal deficit present. Mental Status: He is alert and oriented to person, place, and time. Mental status is at baseline. Psychiatric:         Attention and Perception: Attention and perception normal.         Mood and Affect: Mood and affect normal.         Speech: Speech normal.         Behavior: Behavior normal. Behavior is cooperative. Thought Content: Thought content normal.         Cognition and Memory: Cognition and memory normal.         Judgment: Judgment normal.         Assessment:       Diagnosis Orders   1.  Acute bacterial conjunctivitis of left eye  cephALEXin (KEFLEX) 500 MG capsule    erythromycin

## 2023-11-15 ENCOUNTER — PROCEDURE VISIT (OUTPATIENT)
Dept: PAIN MANAGEMENT | Age: 73
End: 2023-11-15
Payer: MEDICARE

## 2023-11-15 DIAGNOSIS — M47.817 LUMBOSACRAL SPONDYLOSIS WITHOUT MYELOPATHY: Primary | ICD-10-CM

## 2023-11-15 PROCEDURE — 64493 INJ PARAVERT F JNT L/S 1 LEV: CPT | Performed by: PHYSICAL MEDICINE & REHABILITATION

## 2023-11-15 PROCEDURE — 64494 INJ PARAVERT F JNT L/S 2 LEV: CPT | Performed by: PHYSICAL MEDICINE & REHABILITATION

## 2023-11-15 RX ORDER — BETAMETHASONE SODIUM PHOSPHATE AND BETAMETHASONE ACETATE 3; 3 MG/ML; MG/ML
1.5 INJECTION, SUSPENSION INTRA-ARTICULAR; INTRALESIONAL; INTRAMUSCULAR; SOFT TISSUE ONCE
Status: COMPLETED | OUTPATIENT
Start: 2023-11-15 | End: 2023-11-15

## 2023-11-15 RX ORDER — LIDOCAINE HYDROCHLORIDE 10 MG/ML
5 INJECTION, SOLUTION EPIDURAL; INFILTRATION; INTRACAUDAL; PERINEURAL ONCE
Status: COMPLETED | OUTPATIENT
Start: 2023-11-15 | End: 2023-11-15

## 2023-11-15 RX ADMIN — Medication 1 MEQ: at 17:18

## 2023-11-15 RX ADMIN — LIDOCAINE HYDROCHLORIDE 5 ML: 10 INJECTION, SOLUTION EPIDURAL; INFILTRATION; INTRACAUDAL; PERINEURAL at 17:18

## 2023-11-15 RX ADMIN — BETAMETHASONE SODIUM PHOSPHATE AND BETAMETHASONE ACETATE 1.5 MG: 3; 3 INJECTION, SUSPENSION INTRA-ARTICULAR; INTRALESIONAL; INTRAMUSCULAR; SOFT TISSUE at 17:17

## 2023-11-15 NOTE — PROGRESS NOTES
Lumbar Medial Branch Blocks      Patient Name: Catina Parks   : 1950  Date: 11/15/2023     Provider: Melanie Lu MD        Catina Parks is here today for interventional pain management. Preoperatively, the patient presents with symptoms and physical exam findings consistent with lumbar facet zygapophyseal joint mediated pain. He has had persistent pain that limits his function and activities of daily living. The pain is persistent despite conservative measures. He has significant functional and psychological impairment due to this condition. Given his symptoms, physical exam findings, impairment in activities of daily living, and lack of response to conservative measures, consideration for lumbar medial branch blocks was given. Discussed the risks of the procedure including, but not limited to, bleeding, infection, worsened pain, damage to surrounding structures, side effects, toxicity, allergic reactions to medications used, immune and stress-response dysfunction, fat necrosis, avascular necrosis, skin pigmentation changes, blood sugar elevation, headache, vision changes, need for surgery, as well as catastrophic injury such as vision loss, paralysis, stroke, spinal cord infarction or injury, intrathecal injection, spinal cord puncture, arachnoiditis, bowel or bladder incontinence, loss of use of the legs, ventilator dependence, and death. Discussed the risks, benefits, alternative procedures, and alternatives to the procedure including no procedure at all. Discussed that we cannot undo any permanent neurologic damage or change the course of any underlying disease. After thorough discussion, patient expressed understanding and willingness to proceed. Written consent was obtained and is in the chart. Verbal consent to proceed was obtained. Standard ASIPP guidelines were followed and sterile technique used. Area was cleaned with Betadine three times.  Fluoroscopic guidance was used for

## 2023-11-16 ENCOUNTER — PROCEDURE VISIT (OUTPATIENT)
Dept: PAIN MANAGEMENT | Age: 73
End: 2023-11-16
Payer: MEDICARE

## 2023-11-16 DIAGNOSIS — M47.814 THORACIC SPONDYLOSIS: Primary | ICD-10-CM

## 2023-11-16 DIAGNOSIS — M47.817 LUMBOSACRAL SPONDYLOSIS WITHOUT MYELOPATHY: ICD-10-CM

## 2023-11-16 PROCEDURE — 64490 INJ PARAVERT F JNT C/T 1 LEV: CPT | Performed by: PHYSICAL MEDICINE & REHABILITATION

## 2023-11-16 PROCEDURE — 64491 INJ PARAVERT F JNT C/T 2 LEV: CPT | Performed by: PHYSICAL MEDICINE & REHABILITATION

## 2023-11-16 RX ORDER — DEXAMETHASONE SODIUM PHOSPHATE 10 MG/ML
5 INJECTION, EMULSION INTRAMUSCULAR; INTRAVENOUS ONCE
Status: SHIPPED | OUTPATIENT
Start: 2023-11-16

## 2023-11-16 RX ORDER — LIDOCAINE HYDROCHLORIDE 10 MG/ML
5 INJECTION, SOLUTION EPIDURAL; INFILTRATION; INTRACAUDAL; PERINEURAL ONCE
Status: COMPLETED | OUTPATIENT
Start: 2023-11-16 | End: 2023-11-16

## 2023-11-16 RX ADMIN — LIDOCAINE HYDROCHLORIDE 5 ML: 10 INJECTION, SOLUTION EPIDURAL; INFILTRATION; INTRACAUDAL; PERINEURAL at 17:47

## 2023-11-16 RX ADMIN — Medication 1 MEQ: at 17:47

## 2023-11-16 NOTE — PROGRESS NOTES
The patient underwent bilateral lumbar L1/2/3 medial branch blocks on 11/15/23 and obtained >80% pain relief following procedure with positive facet joint provocative maneuvers, schedule second diagnostic medial branch blocks.

## 2023-11-17 ENCOUNTER — HOSPITAL ENCOUNTER (OUTPATIENT)
Dept: NEUROLOGY | Age: 73
Discharge: HOME | End: 2023-11-17
Payer: MEDICARE

## 2023-11-17 DIAGNOSIS — M79.604 RIGHT LEG PAIN: ICD-10-CM

## 2023-11-17 PROCEDURE — 95910 NRV CNDJ TEST 7-8 STUDIES: CPT

## 2023-11-17 PROCEDURE — 95886 MUSC TEST DONE W/N TEST COMP: CPT

## 2023-11-17 NOTE — PROCEDURES
year.    Thank you Dr. Grady Weaver for allowing me to see this patient. Please feel  free to call me if I can be of any further assistance regarding this  patient's evaluation.         Lakisha Huerta MD    D: 11/17/2023 17:04:03       T: 11/17/2023 18:45:18     [email or web address]  Job#: [id number]     Doc#: [id number]    CC:

## 2023-11-21 ENCOUNTER — TELEPHONE (OUTPATIENT)
Dept: PAIN MANAGEMENT | Age: 73
End: 2023-11-21

## 2023-11-21 NOTE — TELEPHONE ENCOUNTER
SECOND BILAT L1,2,3 MBB    NO AUTH REQUIRED    OK to schedule procedure approved as above. Please note sides/levels approved and date range. (If applicable, sides/levels approved may differ from those ordered)    TO BE SCHEDULED WITH DR. Nafisa Magaña

## 2023-11-28 ENCOUNTER — OFFICE VISIT (OUTPATIENT)
Dept: PAIN MANAGEMENT | Age: 73
End: 2023-11-28
Payer: MEDICARE

## 2023-11-28 DIAGNOSIS — G58.8 CLUNEAL NEUROPATHY: Primary | ICD-10-CM

## 2023-11-28 DIAGNOSIS — M47.817 LUMBOSACRAL SPONDYLOSIS WITHOUT MYELOPATHY: ICD-10-CM

## 2023-11-28 DIAGNOSIS — M48.10 DISH (DIFFUSE IDIOPATHIC SKELETAL HYPEROSTOSIS): ICD-10-CM

## 2023-11-28 DIAGNOSIS — M47.814 THORACIC SPONDYLOSIS: ICD-10-CM

## 2023-11-28 PROCEDURE — 77002 NEEDLE LOCALIZATION BY XRAY: CPT | Performed by: PHYSICAL MEDICINE & REHABILITATION

## 2023-11-28 PROCEDURE — 64640 INJECTION TREATMENT OF NERVE: CPT | Performed by: PHYSICAL MEDICINE & REHABILITATION

## 2023-11-28 RX ORDER — LIDOCAINE HYDROCHLORIDE 10 MG/ML
5 INJECTION, SOLUTION EPIDURAL; INFILTRATION; INTRACAUDAL; PERINEURAL ONCE
Status: COMPLETED | OUTPATIENT
Start: 2023-11-28 | End: 2023-11-28

## 2023-11-28 RX ORDER — BETAMETHASONE SODIUM PHOSPHATE AND BETAMETHASONE ACETATE 3; 3 MG/ML; MG/ML
1.5 INJECTION, SUSPENSION INTRA-ARTICULAR; INTRALESIONAL; INTRAMUSCULAR; SOFT TISSUE ONCE
Status: COMPLETED | OUTPATIENT
Start: 2023-11-28 | End: 2023-11-28

## 2023-11-28 RX ADMIN — Medication 1 MEQ: at 16:35

## 2023-11-28 RX ADMIN — LIDOCAINE HYDROCHLORIDE 5 ML: 10 INJECTION, SOLUTION EPIDURAL; INFILTRATION; INTRACAUDAL; PERINEURAL at 16:35

## 2023-11-28 RX ADMIN — BETAMETHASONE SODIUM PHOSPHATE AND BETAMETHASONE ACETATE 1.5 MG: 3; 3 INJECTION, SUSPENSION INTRA-ARTICULAR; INTRALESIONAL; INTRAMUSCULAR; SOFT TISSUE at 16:35

## 2023-11-28 NOTE — PROGRESS NOTES
Superior Cluneal Nerve (Peripheral Nerve) Radiofrequency Ablation/Neurotomy under Fluoroscopic Guidance                                                                Patient Name: Vincent Sky   : 1950     Date: 2023   Provider: Dylan Amaral MD                  PROCEDURE: Right Superior Cluneal Nerve Radiofrequency Ablation/Neurotomy under Fluoroscopic Guidance     INDICATIONS:  Vincent Sky presents with symptoms and physical exam findings consistent with superior cluneal neuropathy. He has severe low back and buttock pain. A focused physical exam demonstrates tenderness over the superior iliac crest with positive Tinel's sign over the superior cluneal nerve. He has had persistent pain that limits his activities of daily living such as lower body dressing. The pain is persistent despite conservative measures including home exercise program. Given his symptoms, physical exam findings, impairment in activities of daily living, and lack of response to conservative measures, consideration for Superior Cluneal Nerve Block under Fluoroscopic Guidance was given. Discussed the risks including but not limited to bleeding, infection, worsened pain, damage to surrounding structures, side effects, toxicity, allergic reactions to medications used, immune and stress-response dysfunction, fat necrosis, decreased bone mineralization, cartilage loss, increased fracture risk, avascular necrosis, skin pigmentation changes, blood sugar elevation, bowel and bladder injury, need for surgery, premature damage or degeneration of the joint, as well as catastrophic injury such as vision loss, colostomy, paralysis, stroke, bowel or bladder incontinence, sexual dysfunction, ventilator dependence, loss of use of the extremity, and death. Discussed the risks, benefits, alternative procedures, and alternatives to the procedure including no procedure at all.  Discussed that we cannot undo any permanent

## 2023-11-28 NOTE — PROGRESS NOTES
The patient underwent bilateral thoracic T4-T5-T6 medial branch blocks on 11/16/2023 and obtained greater than 80% short-term pain relief with a positive diagnostic response. For this reason, recommend:  - Second diagnostic bilateral thoracic T4-T5-T6 medial branch blocks under XR with Dr. Juan Pablo Greenberg. 30-minute procedure. No corticosteroid      EMG BLE 11/17/2023 Dr. Garrison Pain: Suggestive of peripheral neuropathic process axonal.  Superimposed changes mild bilateral L5 and S1 radiculopathy. XR T-spine 11/1/2023 no fracture.   Osteophytes thoracic suggestive of diffuse idiopathic skeletal hyperostosis DISH  XR L spine 11/1/2023: Grade 2 anterolisthesis L5 on S1, no significant change on flexion-extension.    -Refer to rheumatology for evaluation for DISH

## 2023-11-29 DIAGNOSIS — F34.1 PERSISTENT DEPRESSIVE DISORDER: ICD-10-CM

## 2023-11-30 RX ORDER — DULOXETIN HYDROCHLORIDE 30 MG/1
CAPSULE, DELAYED RELEASE ORAL DAILY
Qty: 90 CAPSULE | Refills: 1 | Status: SHIPPED | OUTPATIENT
Start: 2023-11-30

## 2023-12-04 RX ORDER — PRIMIDONE 250 MG/1
TABLET ORAL
Qty: 30 TABLET | Refills: 0 | Status: SHIPPED | OUTPATIENT
Start: 2023-12-04 | End: 2024-01-02

## 2023-12-06 ENCOUNTER — INITIAL CONSULT (OUTPATIENT)
Dept: PODIATRY | Age: 73
End: 2023-12-06
Payer: MEDICARE

## 2023-12-06 VITALS — TEMPERATURE: 97.2 F | HEIGHT: 72 IN | WEIGHT: 193 LBS | BODY MASS INDEX: 26.14 KG/M2

## 2023-12-06 DIAGNOSIS — Q66.52 CONGENITAL PES PLANUS OF BOTH FEET: ICD-10-CM

## 2023-12-06 DIAGNOSIS — M20.12 HALLUX ABDUCTO VALGUS, BILATERAL: ICD-10-CM

## 2023-12-06 DIAGNOSIS — M21.621 TAILOR'S BUNIONETTE, BILATERAL: ICD-10-CM

## 2023-12-06 DIAGNOSIS — Q66.51 CONGENITAL PES PLANUS OF BOTH FEET: ICD-10-CM

## 2023-12-06 DIAGNOSIS — M20.11 HALLUX ABDUCTO VALGUS, BILATERAL: ICD-10-CM

## 2023-12-06 DIAGNOSIS — M79.672 PAIN IN BOTH FEET: Primary | ICD-10-CM

## 2023-12-06 DIAGNOSIS — L98.8 ACQUIRED POROKERATOSIS: ICD-10-CM

## 2023-12-06 DIAGNOSIS — M79.671 PAIN IN BOTH FEET: Primary | ICD-10-CM

## 2023-12-06 DIAGNOSIS — M21.622 TAILOR'S BUNIONETTE, BILATERAL: ICD-10-CM

## 2023-12-06 PROCEDURE — G8417 CALC BMI ABV UP PARAM F/U: HCPCS | Performed by: PODIATRIST

## 2023-12-06 PROCEDURE — 1036F TOBACCO NON-USER: CPT | Performed by: PODIATRIST

## 2023-12-06 PROCEDURE — G8427 DOCREV CUR MEDS BY ELIG CLIN: HCPCS | Performed by: PODIATRIST

## 2023-12-06 PROCEDURE — 1123F ACP DISCUSS/DSCN MKR DOCD: CPT | Performed by: PODIATRIST

## 2023-12-06 PROCEDURE — 99203 OFFICE O/P NEW LOW 30 MIN: CPT | Performed by: PODIATRIST

## 2023-12-06 PROCEDURE — G8484 FLU IMMUNIZE NO ADMIN: HCPCS | Performed by: PODIATRIST

## 2023-12-06 PROCEDURE — 3017F COLORECTAL CA SCREEN DOC REV: CPT | Performed by: PODIATRIST

## 2023-12-06 RX ORDER — AMMONIUM LACTATE 12 G/100G
CREAM TOPICAL
Qty: 385 G | Refills: 5 | Status: SHIPPED | OUTPATIENT
Start: 2023-12-06

## 2023-12-06 ASSESSMENT — ENCOUNTER SYMPTOMS
VOMITING: 0
NAUSEA: 0
SHORTNESS OF BREATH: 0
BACK PAIN: 1

## 2023-12-07 NOTE — PROGRESS NOTES
time.      Deep Tendon Reflexes: Babinski sign absent on the right side. Babinski sign absent on the left side. Reflex Scores:       Patellar reflexes are 2+ on the right side and 2+ on the left side. Achilles reflexes are 2+ on the right side and 2+ on the left side. Comments: No ankle clonus noted bilaterally. Diminished vibratory sensation noted. Sharp versus dull discrimination is intact. Psychiatric:         Mood and Affect: Mood normal.         Behavior: Behavior normal.         Assessment:      Diagnosis Orders   1. Pain in both feet        2. Acquired porokeratosis        3. Tailor's bunionette, bilateral        4. Hallux abducto valgus, bilateral        5. Congenital pes planus of both feet            Plan:     We discussed the etiology and treatment options. I explained the patient I believe the lesions are due to increased pressure or friction to the skin at the lateral foot due to the width of his shoes and the bony prominence at the lateral head of the fifth metatarsal due to tailor's bunionette deformity. Patient is encouraged to buy new pair of shoes that are the correct with, he should be measured with a [employer] device to make sure he is purchasing the the correct size. He may also have his current shoes modified by a shoe repair shop to accommodate the deformities. I have also prescribed Lac-Hydrin cream, he should apply this to lesions daily. Patient instructed to closely monitor for signs of infection or other complications, he should call immediately if these were observed. Orders Placed This Encounter   Medications    ammonium lactate (AMLACTIN) 12 % cream     Sig: Apply topically daily, avoid applying between the toes. Dispense:  385 g     Refill:  5     All questions were answered to the patient's satisfaction. Thank you for allowing me to participate in the care of your patient. Follow up:  Return in about 10 weeks (around 2/14/2024).     [place] Aranda

## 2023-12-27 ENCOUNTER — PROCEDURE VISIT (OUTPATIENT)
Dept: PAIN MANAGEMENT | Age: 73
End: 2023-12-27
Payer: MEDICARE

## 2023-12-27 DIAGNOSIS — M47.817 LUMBOSACRAL SPONDYLOSIS WITHOUT MYELOPATHY: Primary | ICD-10-CM

## 2023-12-27 PROCEDURE — 64493 INJ PARAVERT F JNT L/S 1 LEV: CPT | Performed by: PHYSICAL MEDICINE & REHABILITATION

## 2023-12-27 PROCEDURE — 64494 INJ PARAVERT F JNT L/S 2 LEV: CPT | Performed by: PHYSICAL MEDICINE & REHABILITATION

## 2023-12-27 RX ORDER — LIDOCAINE HYDROCHLORIDE 10 MG/ML
5 INJECTION, SOLUTION EPIDURAL; INFILTRATION; INTRACAUDAL; PERINEURAL ONCE
Status: COMPLETED | OUTPATIENT
Start: 2023-12-27 | End: 2023-12-27

## 2023-12-27 RX ADMIN — LIDOCAINE HYDROCHLORIDE 5 ML: 10 INJECTION, SOLUTION EPIDURAL; INFILTRATION; INTRACAUDAL; PERINEURAL at 16:59

## 2023-12-27 RX ADMIN — Medication 1 MEQ: at 16:59

## 2023-12-27 NOTE — PROGRESS NOTES
Lumbar Medial Branch Blocks -2nd Diagnostic      Patient Name: Niki Iraheta   : 1950  Date: 2023     Provider: Bernie Leventhal, MD        Niki Iraheta is here today for interventional pain management. Preoperatively, the patient presents with symptoms and physical exam findings consistent with lumbar facet zygapophyseal joint mediated pain. He has had persistent pain that limits his function and activities of daily living. The pain is persistent despite conservative measures. He has significant functional and psychological impairment due to this condition. Given his symptoms, physical exam findings, impairment in activities of daily living, and lack of response to conservative measures, consideration for lumbar medial branch blocks was given. Discussed the risks of the procedure including, but not limited to, bleeding, infection, worsened pain, damage to surrounding structures, side effects, toxicity, allergic reactions to medications used, immune and stress-response dysfunction, fat necrosis, avascular necrosis, skin pigmentation changes, blood sugar elevation, headache, vision changes, need for surgery, as well as catastrophic injury such as vision loss, paralysis, stroke, spinal cord infarction or injury, intrathecal injection, spinal cord puncture, arachnoiditis, bowel or bladder incontinence, loss of use of the legs, ventilator dependence, and death. Discussed the risks, benefits, alternative procedures, and alternatives to the procedure including no procedure at all. Discussed that we cannot undo any permanent neurologic damage or change the course of any underlying disease. After thorough discussion, patient expressed understanding and willingness to proceed. Written consent was obtained and is in the chart. Verbal consent to proceed was obtained. Standard ASI guidelines were followed and sterile technique used. Area was cleaned with Betadine three times.  Fluoroscopic

## 2024-01-02 RX ORDER — PRIMIDONE 250 MG/1
TABLET ORAL
Qty: 30 TABLET | Refills: 0 | Status: SHIPPED | OUTPATIENT
Start: 2024-01-02

## 2024-01-10 ENCOUNTER — OFFICE VISIT (OUTPATIENT)
Dept: FAMILY MEDICINE CLINIC | Age: 74
End: 2024-01-10

## 2024-01-10 VITALS
BODY MASS INDEX: 26.52 KG/M2 | OXYGEN SATURATION: 99 % | TEMPERATURE: 97.1 F | SYSTOLIC BLOOD PRESSURE: 104 MMHG | HEART RATE: 96 BPM | WEIGHT: 195.8 LBS | DIASTOLIC BLOOD PRESSURE: 70 MMHG | HEIGHT: 72 IN

## 2024-01-10 DIAGNOSIS — N18.30 STAGE 3 CHRONIC KIDNEY DISEASE, UNSPECIFIED WHETHER STAGE 3A OR 3B CKD (HCC): ICD-10-CM

## 2024-01-10 DIAGNOSIS — M47.816 LUMBAR SPONDYLOSIS: ICD-10-CM

## 2024-01-10 DIAGNOSIS — E03.4 HYPOTHYROIDISM DUE TO ACQUIRED ATROPHY OF THYROID: Primary | ICD-10-CM

## 2024-01-10 DIAGNOSIS — E78.00 HYPERCHOLESTEROLEMIA: ICD-10-CM

## 2024-01-10 DIAGNOSIS — F33.41 RECURRENT MAJOR DEPRESSIVE DISORDER IN PARTIAL REMISSION (HCC): ICD-10-CM

## 2024-01-10 DIAGNOSIS — F41.1 ANXIETY, GENERALIZED: ICD-10-CM

## 2024-01-10 RX ORDER — TIZANIDINE 2 MG/1
2 TABLET ORAL EVERY 6 HOURS PRN
COMMUNITY
End: 2024-01-12 | Stop reason: SDUPTHER

## 2024-01-10 RX ORDER — LEVOTHYROXINE SODIUM 88 UG/1
88 TABLET ORAL DAILY
Qty: 90 TABLET | Refills: 1 | Status: SHIPPED | OUTPATIENT
Start: 2024-01-10

## 2024-01-10 RX ORDER — HYDROCODONE BITARTRATE AND ACETAMINOPHEN 10; 325 MG/1; MG/1
1 TABLET ORAL EVERY 6 HOURS PRN
COMMUNITY

## 2024-01-10 ASSESSMENT — PATIENT HEALTH QUESTIONNAIRE - PHQ9
10. IF YOU CHECKED OFF ANY PROBLEMS, HOW DIFFICULT HAVE THESE PROBLEMS MADE IT FOR YOU TO DO YOUR WORK, TAKE CARE OF THINGS AT HOME, OR GET ALONG WITH OTHER PEOPLE: 1
8. MOVING OR SPEAKING SO SLOWLY THAT OTHER PEOPLE COULD HAVE NOTICED. OR THE OPPOSITE, BEING SO FIGETY OR RESTLESS THAT YOU HAVE BEEN MOVING AROUND A LOT MORE THAN USUAL: 0
SUM OF ALL RESPONSES TO PHQ QUESTIONS 1-9: 16
3. TROUBLE FALLING OR STAYING ASLEEP: 3
SUM OF ALL RESPONSES TO PHQ QUESTIONS 1-9: 16
SUM OF ALL RESPONSES TO PHQ9 QUESTIONS 1 & 2: 5
1. LITTLE INTEREST OR PLEASURE IN DOING THINGS: 2
6. FEELING BAD ABOUT YOURSELF - OR THAT YOU ARE A FAILURE OR HAVE LET YOURSELF OR YOUR FAMILY DOWN: 3
SUM OF ALL RESPONSES TO PHQ QUESTIONS 1-9: 16
4. FEELING TIRED OR HAVING LITTLE ENERGY: 1
5. POOR APPETITE OR OVEREATING: 2
SUM OF ALL RESPONSES TO PHQ QUESTIONS 1-9: 16
9. THOUGHTS THAT YOU WOULD BE BETTER OFF DEAD, OR OF HURTING YOURSELF: 0
2. FEELING DOWN, DEPRESSED OR HOPELESS: 3
7. TROUBLE CONCENTRATING ON THINGS, SUCH AS READING THE NEWSPAPER OR WATCHING TELEVISION: 2

## 2024-01-10 ASSESSMENT — ENCOUNTER SYMPTOMS
ABDOMINAL PAIN: 0
COUGH: 0
RHINORRHEA: 0
VOMITING: 0
WHEEZING: 0
DIARRHEA: 0
SORE THROAT: 0
SHORTNESS OF BREATH: 0
NAUSEA: 0
EYE DISCHARGE: 0

## 2024-01-10 NOTE — PROGRESS NOTES
recognition software and may cause  and /or contain errors related to that system including grammar, punctuation and spelling as well as words and phrases that may seem inappropriate. If there are questions or concerns please feel free to contact me to clarify.    Madalyn Bella, DO

## 2024-01-12 ENCOUNTER — OFFICE VISIT (OUTPATIENT)
Dept: PAIN MANAGEMENT | Age: 74
End: 2024-01-12
Payer: MEDICARE

## 2024-01-12 VITALS
HEIGHT: 72 IN | TEMPERATURE: 97.3 F | BODY MASS INDEX: 26.41 KG/M2 | DIASTOLIC BLOOD PRESSURE: 74 MMHG | WEIGHT: 195 LBS | SYSTOLIC BLOOD PRESSURE: 136 MMHG

## 2024-01-12 DIAGNOSIS — M54.16 LUMBAR RADICULOPATHY: Primary | ICD-10-CM

## 2024-01-12 DIAGNOSIS — M43.07 SPONDYLOLYSIS OF LUMBOSACRAL REGION: ICD-10-CM

## 2024-01-12 DIAGNOSIS — M79.18 MYOFASCIAL PAIN: ICD-10-CM

## 2024-01-12 DIAGNOSIS — M47.814 THORACIC SPONDYLOSIS: ICD-10-CM

## 2024-01-12 PROCEDURE — 1036F TOBACCO NON-USER: CPT | Performed by: PHYSICAL MEDICINE & REHABILITATION

## 2024-01-12 PROCEDURE — 3017F COLORECTAL CA SCREEN DOC REV: CPT | Performed by: PHYSICAL MEDICINE & REHABILITATION

## 2024-01-12 PROCEDURE — G8417 CALC BMI ABV UP PARAM F/U: HCPCS | Performed by: PHYSICAL MEDICINE & REHABILITATION

## 2024-01-12 PROCEDURE — 99214 OFFICE O/P EST MOD 30 MIN: CPT | Performed by: PHYSICAL MEDICINE & REHABILITATION

## 2024-01-12 PROCEDURE — 1123F ACP DISCUSS/DSCN MKR DOCD: CPT | Performed by: PHYSICAL MEDICINE & REHABILITATION

## 2024-01-12 PROCEDURE — G8484 FLU IMMUNIZE NO ADMIN: HCPCS | Performed by: PHYSICAL MEDICINE & REHABILITATION

## 2024-01-12 PROCEDURE — G8427 DOCREV CUR MEDS BY ELIG CLIN: HCPCS | Performed by: PHYSICAL MEDICINE & REHABILITATION

## 2024-01-12 RX ORDER — TIZANIDINE 2 MG/1
2 TABLET ORAL NIGHTLY PRN
Qty: 30 TABLET | Refills: 0 | Status: SHIPPED | OUTPATIENT
Start: 2024-01-12 | End: 2024-02-11

## 2024-01-12 ASSESSMENT — ENCOUNTER SYMPTOMS
BACK PAIN: 1
NAUSEA: 0
SHORTNESS OF BREATH: 0
BACK PAIN: 1
DIARRHEA: 0
CONSTIPATION: 0

## 2024-01-12 NOTE — PROGRESS NOTES
contact the author for any questions or concerns.      Follow up:  Return in about 1 month (around 2/12/2024) for reassessment of pain and symptoms, External Referral.    Bekah Singh MD

## 2024-01-29 RX ORDER — PRIMIDONE 250 MG/1
TABLET ORAL
Qty: 30 TABLET | Refills: 2 | Status: SHIPPED | OUTPATIENT
Start: 2024-01-29

## 2024-01-29 NOTE — TELEPHONE ENCOUNTER
Pharmacy is  requesting medication refill. Please approve or deny this request.    Rx requested:  Requested Prescriptions     Pending Prescriptions Disp Refills    primidone (MYSOLINE) 250 MG tablet [Pharmacy Med Name: Primidone 250 MG Oral Tablet] 30 tablet 2     Sig: Take 1 tablet by mouth nightly         Last Office Visit:   8/31/2023      Next Visit Date:  Future Appointments   Date Time Provider Department Center   2/12/2024  3:45 PM Bekah Singh MD [place] [facility]   2/14/2024  3:30 PM Praveen Hedrick DPM MLOX OP POD [facility]   2/29/2024  4:15 PM David Pang MD [place] [facility] -   4/10/2024  2:45 PM Madalyn Bella,  [facility]

## 2024-02-12 ENCOUNTER — OFFICE VISIT (OUTPATIENT)
Dept: PAIN MANAGEMENT | Age: 74
End: 2024-02-12
Payer: MEDICARE

## 2024-02-12 VITALS
BODY MASS INDEX: 26.41 KG/M2 | DIASTOLIC BLOOD PRESSURE: 70 MMHG | HEIGHT: 72 IN | TEMPERATURE: 97 F | WEIGHT: 195 LBS | SYSTOLIC BLOOD PRESSURE: 128 MMHG

## 2024-02-12 DIAGNOSIS — M43.07 SPONDYLOLYSIS OF LUMBOSACRAL REGION: ICD-10-CM

## 2024-02-12 DIAGNOSIS — M47.814 THORACIC SPONDYLOSIS: ICD-10-CM

## 2024-02-12 DIAGNOSIS — M54.2 NECK PAIN: Primary | ICD-10-CM

## 2024-02-12 PROCEDURE — G8484 FLU IMMUNIZE NO ADMIN: HCPCS | Performed by: PHYSICAL MEDICINE & REHABILITATION

## 2024-02-12 PROCEDURE — 99214 OFFICE O/P EST MOD 30 MIN: CPT | Performed by: PHYSICAL MEDICINE & REHABILITATION

## 2024-02-12 PROCEDURE — G8417 CALC BMI ABV UP PARAM F/U: HCPCS | Performed by: PHYSICAL MEDICINE & REHABILITATION

## 2024-02-12 PROCEDURE — 1123F ACP DISCUSS/DSCN MKR DOCD: CPT | Performed by: PHYSICAL MEDICINE & REHABILITATION

## 2024-02-12 PROCEDURE — G8427 DOCREV CUR MEDS BY ELIG CLIN: HCPCS | Performed by: PHYSICAL MEDICINE & REHABILITATION

## 2024-02-12 PROCEDURE — 1036F TOBACCO NON-USER: CPT | Performed by: PHYSICAL MEDICINE & REHABILITATION

## 2024-02-12 PROCEDURE — 3017F COLORECTAL CA SCREEN DOC REV: CPT | Performed by: PHYSICAL MEDICINE & REHABILITATION

## 2024-02-12 RX ORDER — TIZANIDINE 4 MG/1
4 TABLET ORAL NIGHTLY PRN
Qty: 30 TABLET | Refills: 0 | Status: SHIPPED | OUTPATIENT
Start: 2024-02-12 | End: 2024-03-13

## 2024-02-12 RX ORDER — TIZANIDINE 4 MG/1
4 TABLET ORAL NIGHTLY PRN
Qty: 30 TABLET | Refills: 0 | Status: SHIPPED | OUTPATIENT
Start: 2024-02-12 | End: 2024-02-12 | Stop reason: SDUPTHER

## 2024-02-12 NOTE — PROGRESS NOTES
Higinio Smith  (1950)    2/12/2024    Subjective:     Higinio Smith is 73 y.o. male who complains today of:    Chief Complaint   Patient presents with    Follow-up    Back Pain     Lower    Neck Pain     Last seen by me on 1/12/24: His spouse Teri fell, cracked her head, recovering. Didn't see Rheum yet. Didn't get epidural done yet or trigger point injections. Requesting stronger dose of Tizanidine. No other tests therapy or updates from other physicians, no ER visits. Tizanidine 2 mg helps with remaining functional with chores, personal hygiene, remaining compliant with home exercise program, maintaining his quality of life, and performing activities of daily living. He obtains greater than 50% pain relief without any significant side effects. He is clear to avoid driving or operating heavy machinery or to perform any activities where he may harm himself or others while on pain medications.     Neck pain is a 5/10. Gets to a 9/10. Located on the left side of his neck. Right side is ok. No arm pain, no hand numbness. Constant ache for over 3 months. Worse with activity, work, and turning his neck. Better with rest. Hard to sleep on his left side. There are no other associated symptoms or contextual factors. He denies any classic radicular symptoms, new weakness, saddle anesthesia, bowel or bladder dysfunction, or falls.    Low back pain is a 5/10. Gets to a 7/10. Worse with walking. Better with rest. Located in the left low back and down the left leg. Constant ache for over 4 years. There are no other associated symptoms or contextual factors. He denies any new weakness, saddle anesthesia, bowel or bladder dysfunction, or falls.    Tender right low back pain.     Mid back pain is a 4/10. Gets to a 5/10. The pain is located in the both sides of his mid back. Pain is worse with bending. Pain is better with rest. It has been a constant ache for over 1 year. No falls or trauma.

## 2024-02-14 ENCOUNTER — OFFICE VISIT (OUTPATIENT)
Dept: PODIATRY | Age: 74
End: 2024-02-14
Payer: MEDICARE

## 2024-02-14 VITALS — HEIGHT: 72 IN | WEIGHT: 192 LBS | BODY MASS INDEX: 26.01 KG/M2 | TEMPERATURE: 97.4 F

## 2024-02-14 DIAGNOSIS — L98.8 ACQUIRED POROKERATOSIS: ICD-10-CM

## 2024-02-14 DIAGNOSIS — Q66.52 CONGENITAL PES PLANUS OF BOTH FEET: ICD-10-CM

## 2024-02-14 DIAGNOSIS — M20.12 HALLUX ABDUCTO VALGUS, BILATERAL: ICD-10-CM

## 2024-02-14 DIAGNOSIS — M21.622 TAILOR'S BUNIONETTE, BILATERAL: ICD-10-CM

## 2024-02-14 DIAGNOSIS — Q66.51 CONGENITAL PES PLANUS OF BOTH FEET: ICD-10-CM

## 2024-02-14 DIAGNOSIS — M21.621 TAILOR'S BUNIONETTE, BILATERAL: ICD-10-CM

## 2024-02-14 DIAGNOSIS — M79.672 PAIN IN BOTH FEET: Primary | ICD-10-CM

## 2024-02-14 DIAGNOSIS — M79.671 PAIN IN BOTH FEET: Primary | ICD-10-CM

## 2024-02-14 DIAGNOSIS — M20.11 HALLUX ABDUCTO VALGUS, BILATERAL: ICD-10-CM

## 2024-02-14 PROCEDURE — G8484 FLU IMMUNIZE NO ADMIN: HCPCS | Performed by: PODIATRIST

## 2024-02-14 PROCEDURE — 3017F COLORECTAL CA SCREEN DOC REV: CPT | Performed by: PODIATRIST

## 2024-02-14 PROCEDURE — 1036F TOBACCO NON-USER: CPT | Performed by: PODIATRIST

## 2024-02-14 PROCEDURE — 99213 OFFICE O/P EST LOW 20 MIN: CPT | Performed by: PODIATRIST

## 2024-02-14 PROCEDURE — 1123F ACP DISCUSS/DSCN MKR DOCD: CPT | Performed by: PODIATRIST

## 2024-02-14 PROCEDURE — G8417 CALC BMI ABV UP PARAM F/U: HCPCS | Performed by: PODIATRIST

## 2024-02-14 PROCEDURE — G8427 DOCREV CUR MEDS BY ELIG CLIN: HCPCS | Performed by: PODIATRIST

## 2024-02-14 NOTE — PROGRESS NOTES
Constitutional:       Appearance: Normal appearance.   HENT:      Head: Normocephalic and atraumatic.   Cardiovascular:      Pulses:           Dorsalis pedis pulses are 2+ on the right side and 2+ on the left side.        Posterior tibial pulses are 2+ on the right side and 2+ on the left side.      Comments: Skin temperature gradient is warm to warm from proximal tibial to distal toes bilaterally.  Normal hair growth noted bilaterally.  Telangiectasia noted bilaterally.  No changes consistent with ischemia noted bilaterally.   Pulmonary:      Effort: Pulmonary effort is normal.   Musculoskeletal:      Right lower leg: No edema.      Left lower leg: No edema.      Right foot: Decreased range of motion. Deformity and bunion present.      Left foot: Decreased range of motion. Deformity and bunion present.   Feet:      Right foot:      Protective Sensation: 10 sites tested.  10 sites sensed.      Skin integrity: Callus and dry skin present.      Left foot:      Protective Sensation: 10 sites tested.  10 sites sensed.      Skin integrity: Callus and dry skin present.      Comments: Planus foot type noted bilaterally.    MMT graded 5/5 for all muscle groups bilaterally.  Hallux abductovalgus deformity noted bilaterally.  Tailor's bunionette deformity with adductovarus rotation deformity of the fourth and fifth toe noted bilaterally.  Decreased ankle joint dorsiflexion noted bilaterally.    Lymphadenopathy:      Comments: Popliteal lymph nodes are soft and nontender.     Skin:     General: Skin is warm and dry.      Capillary Refill: Capillary refill takes less than 2 seconds.      Comments: No open lesions noted bilaterally.  Normal skin turgor noted bilaterally.  Dry skin texture noted bilaterally.  Focal hyperkeratotic lesions noted to the bilateral hallux IPJ.  Enucleated hyperkeratotic lesions consistent with porokeratosis noted to the lateral foot at the head of the fifth metatarsal bilaterally.  There is

## 2024-02-22 ENCOUNTER — HOSPITAL ENCOUNTER (OUTPATIENT)
Dept: GENERAL RADIOLOGY | Age: 74
Discharge: HOME | End: 2024-02-24
Attending: PHYSICAL MEDICINE & REHABILITATION
Payer: MEDICARE

## 2024-02-22 DIAGNOSIS — M54.2 NECK PAIN: ICD-10-CM

## 2024-02-22 PROCEDURE — 72040 X-RAY EXAM NECK SPINE 2-3 VW: CPT

## 2024-02-29 ENCOUNTER — OFFICE VISIT (OUTPATIENT)
Dept: NEUROLOGY | Age: 74
End: 2024-02-29

## 2024-02-29 VITALS
HEART RATE: 87 BPM | WEIGHT: 201 LBS | SYSTOLIC BLOOD PRESSURE: 120 MMHG | BODY MASS INDEX: 27.41 KG/M2 | DIASTOLIC BLOOD PRESSURE: 70 MMHG

## 2024-02-29 DIAGNOSIS — M54.16 LUMBAR RADICULOPATHY: ICD-10-CM

## 2024-02-29 DIAGNOSIS — G95.9 CERVICAL MYELOPATHY (HCC): ICD-10-CM

## 2024-02-29 DIAGNOSIS — M48.062 SPINAL STENOSIS OF LUMBAR REGION WITH NEUROGENIC CLAUDICATION: Primary | ICD-10-CM

## 2024-02-29 DIAGNOSIS — G25.0 ESSENTIAL TREMOR: ICD-10-CM

## 2024-02-29 DIAGNOSIS — R25.1 TREMORS OF NERVOUS SYSTEM: ICD-10-CM

## 2024-02-29 RX ORDER — HYDROCODONE BITARTRATE AND ACETAMINOPHEN 10; 325 MG/1; MG/1
1 TABLET ORAL EVERY 6 HOURS PRN
Qty: 30 TABLET | Refills: 0 | Status: SHIPPED | OUTPATIENT
Start: 2024-02-29 | End: 2024-03-30

## 2024-02-29 ASSESSMENT — ENCOUNTER SYMPTOMS
TROUBLE SWALLOWING: 0
PHOTOPHOBIA: 0
NAUSEA: 0
VOMITING: 0
BACK PAIN: 1
CHOKING: 0
COLOR CHANGE: 0
SHORTNESS OF BREATH: 0

## 2024-02-29 NOTE — PROGRESS NOTES
Subjective:      Patient ID: Higinio Smith is a 73 y.o. male who presents today for:  Chief Complaint   Patient presents with    6 Month Follow-Up     Pt states the doctor told him he made a mistake its T5-T11 the patient states he had xray taken. Pt has arthritis through C3-C7 worst on the left side than the right side. Pt states he is in more pain now. Pt wants a increases in oxycodone last week was pt last dose pt states he only take it when he really needs it       HPI 73 right-handed male with a history of essential tremors and lumbar colopathy with lumbar canal stenosis.  Patient was last seen here 6 months  And patient continues on Mysoline 150 mg.  When last seen we were not quite concerned that he had parkinson's disease.  Patient continues on Zanaflex.  He is doing much better with antidepressants.  MRI follow-up has been ordered.  In April we had given him some hydrocodone which had had.  MRI lumbar spine noted with bilateral L5 spondylolysis with moderate neuroforaminal stenosis but no canal stenosis..  Patient is on hydrocodone and  On 10 mg once a day    Patient is having more issues with his upper spine now.  And has failed multiple pain medications and modalities.  Past Medical History:   Diagnosis Date    Anemia     Anxiety, generalized     BPH (benign prostatic hyperplasia)     CRF (chronic renal failure)     stage 3 -   5/24/17 patient denies    Depression     Has a tremor     HIGH CHOLESTEROL     Hypothyroid     IBD (inflammatory bowel disease)     Insomnia     Kidney stones     Seasonal allergic conjunctivitis     Tinnitus     chronic      Past Surgical History:   Procedure Laterality Date    CATARACT REMOVAL Bilateral     COLONOSCOPY      KNEE SURGERY      Bilat- knee,legs    OR COLON CA SCRN NOT HI RSK IND N/A 05/24/2017    COLONOSCOPY performed by Ray Mendosa MD at [facility]    SHOULDER SURGERY      L shoulder repair    THYROID SURGERY  1999     Social History

## 2024-03-07 ENCOUNTER — HOSPITAL ENCOUNTER (OUTPATIENT)
Dept: MRI IMAGING | Age: 74
Discharge: HOME | End: 2024-03-09
Attending: PSYCHIATRY & NEUROLOGY
Payer: MEDICARE

## 2024-03-07 DIAGNOSIS — M48.062 SPINAL STENOSIS OF LUMBAR REGION WITH NEUROGENIC CLAUDICATION: ICD-10-CM

## 2024-03-07 PROCEDURE — G1010 CDSM STANSON: HCPCS

## 2024-03-11 ENCOUNTER — PROCEDURE VISIT (OUTPATIENT)
Dept: PAIN MANAGEMENT | Age: 74
End: 2024-03-11
Payer: MEDICARE

## 2024-03-11 VITALS
DIASTOLIC BLOOD PRESSURE: 70 MMHG | SYSTOLIC BLOOD PRESSURE: 122 MMHG | WEIGHT: 200 LBS | HEIGHT: 71 IN | BODY MASS INDEX: 28 KG/M2

## 2024-03-11 DIAGNOSIS — M47.812 CERVICAL SPONDYLOSIS WITHOUT MYELOPATHY: ICD-10-CM

## 2024-03-11 DIAGNOSIS — M47.814 THORACIC SPONDYLOSIS: ICD-10-CM

## 2024-03-11 DIAGNOSIS — M54.16 LUMBAR RADICULOPATHY: ICD-10-CM

## 2024-03-11 DIAGNOSIS — M47.817 LUMBOSACRAL SPONDYLOSIS WITHOUT MYELOPATHY: Primary | ICD-10-CM

## 2024-03-11 PROCEDURE — 96372 THER/PROPH/DIAG INJ SC/IM: CPT | Performed by: PAIN MEDICINE

## 2024-03-11 PROCEDURE — 99213 OFFICE O/P EST LOW 20 MIN: CPT | Performed by: PAIN MEDICINE

## 2024-03-11 RX ORDER — DEXAMETHASONE SODIUM PHOSPHATE 10 MG/ML
10 INJECTION, SOLUTION INTRAMUSCULAR; INTRAVENOUS ONCE
Status: COMPLETED | OUTPATIENT
Start: 2024-03-11 | End: 2024-03-11

## 2024-03-11 RX ADMIN — DEXAMETHASONE SODIUM PHOSPHATE 10 MG: 10 INJECTION, SOLUTION INTRAMUSCULAR; INTRAVENOUS at 15:51

## 2024-03-11 NOTE — PROGRESS NOTES
[employer], [place].  Spine Surgery  Advanced Pain Management      Patient Name: Higinio Smith : 1950  Date: 3/11/2024   Physician: EDWARD BERNARD DO      Higinio Smith  is here today for interventional pain management.    Preoperatively, the patient presents with radicular pain in the affected area as per history and exam. Patient has failed NSAIDs, PT, and conservative treatment. Patient has significant psychological and functional impairment due to this condition.  Standard ASIPP guidelines were followed and sterile technique used.  Area was cleaned with Betadine x3.  Informed consent was obtained.  Fluoroscopic guidance was used for this procedure.    TRANSFORAMINAL EPIDURAL  There was appropriate spread of contrast in the anterior epidural space and around the exiting nerve root. The 6 o’clock position of the pedicle was identified. Multiple views of fluoroscopy including lateral were used to confirm accurate needle placement of depth. Live fluoroscopy was used when injecting contrast to ensure no subdural or vascular spread. In total, approximately 5 mg of Dexamethasone and 1.0 ml of 0.9cc of normal saline was injected slowly without difficulty.    Patient tolerated the procedure well, no obvious complications occurred during the procedure.  Patient was appropriately monitored and discharged home in stable condition with their usual motor strength. Post Op instructions were given to patient. Patient will resume blood thinners after procedure if they stopped them before procedure. Relevant imaging was reviewed with patient.           [] Bilateral [] T12-L1 [] L3-4        [] L1-2 [] L4-5       [] Right [] L2-3 [x] L5-S1                [x] Left                  EDWARD BERNARD DO      [address] [facility], [address] [place], [place] [address]  Phone [phone]/Fax [phone]/[email or web address]  
   KNEE SURGERY      Bilat- knee,legs    UT COLON CA SCRN NOT HI RSK IND N/A 2017    COLONOSCOPY performed by Ray Mendosa MD at [facility]    SHOULDER SURGERY      L shoulder repair    THYROID SURGERY       Family History   Adopted: Yes     Social History     Socioeconomic History    Marital status:      Spouse name: Teri    Number of children: Not on file    Years of education: Not on file    Highest education level: Not on file   Occupational History    Not on file   Tobacco Use    Smoking status: Former     Current packs/day: 0.00     Average packs/day: 0.5 packs/day for 30.0 years (15.0 ttl pk-yrs)     Types: Pipe, Cigarettes     Start date: 1972     Quit date: 2002     Years since quittin.2    Smokeless tobacco: Never   Vaping Use    Vaping Use: Never used   Substance and Sexual Activity    Alcohol use: Yes     Comment: Occasional    Drug use: No    Sexual activity: Yes     Partners: Female   Other Topics Concern    Not on file   Social History Narrative    Not on file     Social Determinants of Health     Financial Resource Strain: Low Risk  (2/10/2023)    Overall Financial Resource Strain (CARDIA)     Difficulty of Paying Living Expenses: Not hard at all   Food Insecurity: Not on file (2/10/2023)   Transportation Needs: Unknown (2/10/2023)    PRAPARE - Transportation     Lack of Transportation (Medical): Not on file     Lack of Transportation (Non-Medical): No   Physical Activity: Inactive (2023)    Exercise Vital Sign     Days of Exercise per Week: 0 days     Minutes of Exercise per Session: 0 min   Stress: Not on file   Social Connections: Not on file   Intimate Partner Violence: Not on file   Housing Stability: Unknown (2/10/2023)    Housing Stability Vital Sign     Unable to Pay for Housing in the Last Year: Not on file     Number of Places Lived in the Last Year: Not on file     Unstable Housing in the Last Year: No       Current Outpatient

## 2024-03-19 ENCOUNTER — OFFICE VISIT (OUTPATIENT)
Dept: PAIN MANAGEMENT | Age: 74
End: 2024-03-19
Payer: MEDICARE

## 2024-03-19 DIAGNOSIS — M47.817 LUMBOSACRAL SPONDYLOSIS WITHOUT MYELOPATHY: ICD-10-CM

## 2024-03-19 PROCEDURE — 64636 DESTROY L/S FACET JNT ADDL: CPT | Performed by: PAIN MEDICINE

## 2024-03-19 PROCEDURE — 64635 DESTROY LUMB/SAC FACET JNT: CPT | Performed by: PAIN MEDICINE

## 2024-03-19 RX ORDER — BETAMETHASONE SODIUM PHOSPHATE AND BETAMETHASONE ACETATE 3; 3 MG/ML; MG/ML
6 INJECTION, SUSPENSION INTRA-ARTICULAR; INTRALESIONAL; INTRAMUSCULAR; SOFT TISSUE ONCE
Status: COMPLETED | OUTPATIENT
Start: 2024-03-19 | End: 2024-03-19

## 2024-03-19 RX ORDER — LIDOCAINE HYDROCHLORIDE 10 MG/ML
3 INJECTION, SOLUTION EPIDURAL; INFILTRATION; INTRACAUDAL; PERINEURAL ONCE
Status: COMPLETED | OUTPATIENT
Start: 2024-03-19 | End: 2024-03-19

## 2024-03-19 RX ADMIN — BETAMETHASONE SODIUM PHOSPHATE AND BETAMETHASONE ACETATE 6 MG: 3; 3 INJECTION, SUSPENSION INTRA-ARTICULAR; INTRALESIONAL; INTRAMUSCULAR; SOFT TISSUE at 15:41

## 2024-03-19 RX ADMIN — LIDOCAINE HYDROCHLORIDE 3 ML: 10 INJECTION, SOLUTION EPIDURAL; INFILTRATION; INTRACAUDAL; PERINEURAL at 15:41

## 2024-03-19 NOTE — PROGRESS NOTES
[facility]  [facility]  [address], [address]  [place], [place]  P: [phone]  F: [phone]      Lumbar Radio Frequency Ablation     Provider: EDWARD BERNARD DO          Patient Name: Higinio Smith : 1950        Date: 3/19/2024      Higinio Smith is here today for interventional pain management.  Standard ASI guidelines were followed and sterile technique used.  Area was cleaned with Betadine x3.  Informed consent was obtained.  Fluoroscopic guidance was used for this procedure. Multiple views of fluoroscopy were used during procedure to assist with needle placement. Appropriate sized RF 10mm active tip needle was used and advance to appropriate anatomic location.    There was appropriate multifidus contraction noted with motor stimulation at 2 Hz between 0.5-1.5 volts. No limb or gluteal contraction was noted taking it up to 3.5 volts. Prior to lesioning at 80 degrees Celsius for 90 seconds, approximately 0.75mg/1mg of Celestone and ½ cc of 1% preservative free Lidocaine was injected. Impedance was between 200-500 ohms during the procedure.     Patient tolerated the procedure well, no obvious complications occurred during the procedure.  Patient was appropriately monitored and discharged home in stable condition with their usual motor strength. Post Op instructions were given to patient.          [x] Bilateral [] T11 [x] L1 [] S1     [] T12 [x] L2 [] S2    [] Right  [x] L3 [] S3      [] L4 [] S4    [] Left  [] L5                              EDWARD BERNARD DO

## 2024-04-02 ENCOUNTER — TELEPHONE (OUTPATIENT)
Dept: PAIN MANAGEMENT | Age: 74
End: 2024-04-02

## 2024-04-02 DIAGNOSIS — M43.10 ANTEROLISTHESIS: ICD-10-CM

## 2024-04-02 DIAGNOSIS — M47.812 CERVICAL SPONDYLOSIS WITHOUT MYELOPATHY: Primary | ICD-10-CM

## 2024-04-02 NOTE — TELEPHONE ENCOUNTER
XR C-spine 2/26/2024 reviewed, multilevel anterolisthesis.  - XR C-spine flexion-extension evaluate instability

## 2024-04-07 SDOH — ECONOMIC STABILITY: FOOD INSECURITY: WITHIN THE PAST 12 MONTHS, THE FOOD YOU BOUGHT JUST DIDN'T LAST AND YOU DIDN'T HAVE MONEY TO GET MORE.: NEVER TRUE

## 2024-04-07 SDOH — ECONOMIC STABILITY: FOOD INSECURITY: WITHIN THE PAST 12 MONTHS, YOU WORRIED THAT YOUR FOOD WOULD RUN OUT BEFORE YOU GOT MONEY TO BUY MORE.: NEVER TRUE

## 2024-04-07 SDOH — ECONOMIC STABILITY: INCOME INSECURITY: HOW HARD IS IT FOR YOU TO PAY FOR THE VERY BASICS LIKE FOOD, HOUSING, MEDICAL CARE, AND HEATING?: NOT HARD AT ALL

## 2024-04-10 ENCOUNTER — OFFICE VISIT (OUTPATIENT)
Dept: FAMILY MEDICINE CLINIC | Age: 74
End: 2024-04-10

## 2024-04-10 VITALS
HEIGHT: 71 IN | HEART RATE: 83 BPM | OXYGEN SATURATION: 98 % | TEMPERATURE: 97.2 F | SYSTOLIC BLOOD PRESSURE: 100 MMHG | WEIGHT: 192 LBS | BODY MASS INDEX: 26.88 KG/M2 | DIASTOLIC BLOOD PRESSURE: 70 MMHG

## 2024-04-10 DIAGNOSIS — N18.30 STAGE 3 CHRONIC KIDNEY DISEASE, UNSPECIFIED WHETHER STAGE 3A OR 3B CKD (HCC): ICD-10-CM

## 2024-04-10 DIAGNOSIS — E03.4 HYPOTHYROIDISM DUE TO ACQUIRED ATROPHY OF THYROID: ICD-10-CM

## 2024-04-10 DIAGNOSIS — G25.0 ESSENTIAL TREMOR: ICD-10-CM

## 2024-04-10 DIAGNOSIS — E78.00 HYPERCHOLESTEROLEMIA: ICD-10-CM

## 2024-04-10 DIAGNOSIS — G47.00 INSOMNIA, UNSPECIFIED TYPE: ICD-10-CM

## 2024-04-10 DIAGNOSIS — F34.1 PERSISTENT DEPRESSIVE DISORDER: Primary | ICD-10-CM

## 2024-04-10 DIAGNOSIS — F41.1 ANXIETY, GENERALIZED: ICD-10-CM

## 2024-04-10 LAB
ALBUMIN SERPL-MCNC: 4.1 G/DL (ref 3.5–4.6)
ALP SERPL-CCNC: 111 U/L (ref 35–104)
ALT SERPL-CCNC: 13 U/L (ref 0–41)
ANION GAP SERPL CALCULATED.3IONS-SCNC: 16 MEQ/L (ref 9–15)
AST SERPL-CCNC: 23 U/L (ref 0–40)
BASOPHILS # BLD: 0 K/UL (ref 0–0.2)
BASOPHILS NFR BLD: 0.7 %
BILIRUB SERPL-MCNC: 0.5 MG/DL (ref 0.2–0.7)
BUN SERPL-MCNC: 14 MG/DL (ref 8–23)
CALCIUM SERPL-MCNC: 8.9 MG/DL (ref 8.5–9.9)
CHLORIDE SERPL-SCNC: 107 MEQ/L (ref 95–107)
CHOLEST SERPL-MCNC: 155 MG/DL (ref 0–199)
CO2 SERPL-SCNC: 22 MEQ/L (ref 20–31)
CREAT SERPL-MCNC: 1.24 MG/DL (ref 0.7–1.2)
EOSINOPHIL # BLD: 0.1 K/UL (ref 0–0.7)
EOSINOPHIL NFR BLD: 2.8 %
ERYTHROCYTE [DISTWIDTH] IN BLOOD BY AUTOMATED COUNT: 12.2 % (ref 11.5–14.5)
GLOBULIN SER CALC-MCNC: 2.8 G/DL (ref 2.3–3.5)
GLUCOSE FASTING: 74 MG/DL (ref 70–99)
HCT VFR BLD AUTO: 45.2 % (ref 42–52)
HDLC SERPL-MCNC: 35 MG/DL (ref 40–59)
HGB BLD-MCNC: 15.3 G/DL (ref 14–18)
LDL CHOLESTEROL CALCULATED: 104 MG/DL (ref 0–129)
LYMPHOCYTES # BLD: 1 K/UL (ref 1–4.8)
LYMPHOCYTES NFR BLD: 22.3 %
MCH RBC QN AUTO: 32.7 PG (ref 27–31.3)
MCHC RBC AUTO-ENTMCNC: 33.8 % (ref 33–37)
MCV RBC AUTO: 96.6 FL (ref 79–92.2)
MONOCYTES # BLD: 0.3 K/UL (ref 0.2–0.8)
MONOCYTES NFR BLD: 8 %
NEUTROPHILS # BLD: 2.8 K/UL (ref 1.4–6.5)
NEUTS SEG NFR BLD: 66 %
PLATELET # BLD AUTO: 257 K/UL (ref 130–400)
POTASSIUM SERPL-SCNC: 3.9 MEQ/L (ref 3.4–4.9)
PROT SERPL-MCNC: 6.9 G/DL (ref 6.3–8)
RBC # BLD AUTO: 4.68 M/UL (ref 4.7–6.1)
SODIUM SERPL-SCNC: 145 MEQ/L (ref 135–144)
TRIGLYCERIDE, FASTING: 82 MG/DL (ref 0–150)
TSH REFLEX: 1.9 UIU/ML (ref 0.44–3.86)
WBC # BLD AUTO: 4.3 K/UL (ref 4.8–10.8)

## 2024-04-10 RX ORDER — TIZANIDINE 4 MG/1
4 TABLET ORAL EVERY 6 HOURS PRN
COMMUNITY

## 2024-04-10 RX ORDER — HYDROCODONE BITARTRATE AND ACETAMINOPHEN 10; 325 MG/1; MG/1
1 TABLET ORAL EVERY 6 HOURS PRN
COMMUNITY

## 2024-04-10 NOTE — PROGRESS NOTES
Subjective  Higinio Smith, 73 y.o. male presents today with:  Chief Complaint   Patient presents with    3 Month Follow-Up    Depression     Continues to feel a little down & depressed. Does not feel like medication is helping much.     Anxiety     Continues to be the same. Does not feel like medication has made much of a difference.     Insomnia     Continues to have trouble falling asleep.     Chronic Kidney Disease    Hypothyroidism     Had lab work done today. Taking mediation as directed.     Hyperlipidemia     Had lab work done today. Taking mediation as directed.     Health Maintenance     Refuses colonoscopy, cologuard & FIT test.       Patient with 3-month follow-up appointment.    Patient with anxiety and depression.  He continues to feel down and depressed at times.  He does not know if the medication is made much of a difference but would like to continue on it.  He is currently on buspirone 10 mg 3 times daily and duloxetine 30 mg daily.  Does not wish to increase the doses at this time.  No suicidal or homicidal ideation.  He just feels like the way the world is makes his mood bad.    Patient also with stage III chronic kidney disease.  This is been stable.    Patient also with essential tremor.  He does follow with neurology.  He is on primidone which helps.  Does not need a refill today.    Patient also with hypothyroidism.  Currently on levothyroxine 88 mcg daily.  Tolerating well.  No changes to hair skin or nails.  No heat or cold intolerance.    Patient also with hypercholesterolemia.  He is on atorvastatin 40 mg daily.  Tolerating well.  No muscle aches or pains.    Patient also with continued insomnia.  Does not wish to take anything for it.  He states he just has some difficulty falling asleep and staying asleep.  No other concerns at this time      Review of Systems   Constitutional:  Negative for chills, fatigue, fever and unexpected weight change.   HENT:  Negative for congestion,

## 2024-04-12 ASSESSMENT — ENCOUNTER SYMPTOMS
SORE THROAT: 0
RHINORRHEA: 0
DIARRHEA: 0
NAUSEA: 0
EYE DISCHARGE: 0
VOMITING: 0
ABDOMINAL PAIN: 0
WHEEZING: 0
SHORTNESS OF BREATH: 0
COUGH: 0

## 2024-04-12 NOTE — RESULT ENCOUNTER NOTE
Please let patient know that lipid panel is stable.  Metabolic panel stable.  CBC is stable.  Thyroid normal.  Follow-up as scheduled.  Thanks

## 2024-04-18 ENCOUNTER — PROCEDURE VISIT (OUTPATIENT)
Dept: PAIN MANAGEMENT | Age: 74
End: 2024-04-18
Payer: MEDICARE

## 2024-04-18 DIAGNOSIS — M79.18 MYOFASCIAL PAIN: Primary | ICD-10-CM

## 2024-04-18 PROCEDURE — 20553 NJX 1/MLT TRIGGER POINTS 3/>: CPT | Performed by: PHYSICAL MEDICINE & REHABILITATION

## 2024-04-18 RX ORDER — BETAMETHASONE SODIUM PHOSPHATE AND BETAMETHASONE ACETATE 3; 3 MG/ML; MG/ML
3 INJECTION, SUSPENSION INTRA-ARTICULAR; INTRALESIONAL; INTRAMUSCULAR; SOFT TISSUE ONCE
Status: COMPLETED | OUTPATIENT
Start: 2024-04-18 | End: 2024-04-18

## 2024-04-18 RX ORDER — BUPIVACAINE HYDROCHLORIDE 5 MG/ML
9.5 INJECTION, SOLUTION PERINEURAL ONCE
Status: COMPLETED | OUTPATIENT
Start: 2024-04-18 | End: 2024-04-18

## 2024-04-18 RX ADMIN — BETAMETHASONE SODIUM PHOSPHATE AND BETAMETHASONE ACETATE 3 MG: 3; 3 INJECTION, SUSPENSION INTRA-ARTICULAR; INTRALESIONAL; INTRAMUSCULAR; SOFT TISSUE at 17:13

## 2024-04-18 RX ADMIN — BUPIVACAINE HYDROCHLORIDE 47.5 MG: 5 INJECTION, SOLUTION PERINEURAL at 17:15

## 2024-04-18 NOTE — PROGRESS NOTES
The patient has a focal area of pain in skeletal muscles. There is clinical evidence of trigger points with a hyperirritable spot and physical exam confirmation of a abnormally firm nodule. The patient has failed conservative treatment, has limited movement of the joint and her limb, a trigger point injection is necessary for diagnostic treatment.    Sat down with the patient and discussed the MRI cervical spine findings from 3/7/2024 with an anatomic spine model demonstrating areas of moderate canal stenosis as well as multilevel cervical foraminal stenosis.  Patient appears to be having more facet related axial neck pain for which we discussed left-sided cervical medial branch blocks likely at C3-C4-C5.  I encouraged him to obtain the x-ray cervical spine flexion-extension films ordered 4/2/2024 as these do not appear to have been resulted yet.     The patient will see how his neck is doing and let us know if he decides to proceed with cervical medial branch blocks.  Will follow-up in 6 weeks to discuss his symptoms and reevaluate his progress.  
total of 10 trigger points. The sites were prepped in a sterile manner with Betadine three times and alcohol. Then a 27-gauge 1.5 inch needle was advanced to the trigger points in a sequential manner. Care was taken to enter the trigger points without injuring adjacent tissue. Each trigger point was sequentially entered. Aspiration for blood or other fluids were negative. Then a 10 mL injectate containing 0.5 mL of 3 mg of betamethasone and 9.5 mL of 0.5% preservative-free bupivacaine was injected and divided equally between the trigger points. At each trigger point aspiration was negative. A single needle was used. Each site was hemostatic. Each site was cleaned and appropriately dressed.    The patient tolerated the procedure well. There were no immediate post procedure complications. Post procedure instructions were given. The patient was monitored for a short period of time and discharged home with his baseline neurologic and orthopaedic exam. The patient will follow-up as previously instructed.         [address] [facility], [address] [place] [address]  Phone [phone]/Fax [phone]

## 2024-04-30 RX ORDER — PRIMIDONE 250 MG/1
TABLET ORAL
Qty: 30 TABLET | Refills: 0 | Status: SHIPPED | OUTPATIENT
Start: 2024-04-30 | End: 2024-05-28

## 2024-04-30 NOTE — TELEPHONE ENCOUNTER
Pharmacy is  requesting medication refill. Please approve or deny this request.    Rx requested:  Requested Prescriptions     Pending Prescriptions Disp Refills    primidone (MYSOLINE) 250 MG tablet [Pharmacy Med Name: Primidone 250 MG Oral Tablet] 30 tablet 0     Sig: Take 1 tablet by mouth nightly         Last Office Visit:   2/29/2024      Next Visit Date:  Future Appointments   Date Time Provider Department Center   5/24/2024  2:45 PM Bekah Singh MD [facility]   8/29/2024  3:45 PM David Pang MD [place] [facility] -   10/9/2024  2:45 PM Madalyn Bella DO [facility]

## 2024-05-23 ENCOUNTER — HOSPITAL ENCOUNTER (OUTPATIENT)
Dept: GENERAL RADIOLOGY | Age: 74
Discharge: HOME | End: 2024-05-25
Attending: PHYSICAL MEDICINE & REHABILITATION
Payer: MEDICARE

## 2024-05-23 DIAGNOSIS — M47.812 CERVICAL SPONDYLOSIS WITHOUT MYELOPATHY: ICD-10-CM

## 2024-05-23 DIAGNOSIS — M43.10 ANTEROLISTHESIS: ICD-10-CM

## 2024-05-23 PROCEDURE — 72052 X-RAY EXAM NECK SPINE 6/>VWS: CPT

## 2024-05-24 ENCOUNTER — OFFICE VISIT (OUTPATIENT)
Dept: PAIN MANAGEMENT | Age: 74
End: 2024-05-24
Payer: MEDICARE

## 2024-05-24 VITALS
SYSTOLIC BLOOD PRESSURE: 112 MMHG | TEMPERATURE: 97.6 F | DIASTOLIC BLOOD PRESSURE: 72 MMHG | WEIGHT: 192 LBS | HEIGHT: 72 IN | BODY MASS INDEX: 26.01 KG/M2

## 2024-05-24 DIAGNOSIS — M43.07 SPONDYLOLYSIS OF LUMBOSACRAL REGION: ICD-10-CM

## 2024-05-24 DIAGNOSIS — M47.814 THORACIC SPONDYLOSIS: ICD-10-CM

## 2024-05-24 PROCEDURE — G8427 DOCREV CUR MEDS BY ELIG CLIN: HCPCS | Performed by: PHYSICAL MEDICINE & REHABILITATION

## 2024-05-24 PROCEDURE — 99213 OFFICE O/P EST LOW 20 MIN: CPT | Performed by: PHYSICAL MEDICINE & REHABILITATION

## 2024-05-24 PROCEDURE — 1123F ACP DISCUSS/DSCN MKR DOCD: CPT | Performed by: PHYSICAL MEDICINE & REHABILITATION

## 2024-05-24 PROCEDURE — G8417 CALC BMI ABV UP PARAM F/U: HCPCS | Performed by: PHYSICAL MEDICINE & REHABILITATION

## 2024-05-24 PROCEDURE — 1036F TOBACCO NON-USER: CPT | Performed by: PHYSICAL MEDICINE & REHABILITATION

## 2024-05-24 RX ORDER — TIZANIDINE 4 MG/1
4 TABLET ORAL NIGHTLY PRN
Qty: 30 TABLET | Refills: 0 | Status: SHIPPED | OUTPATIENT
Start: 2024-05-24 | End: 2024-06-23

## 2024-05-24 ASSESSMENT — ENCOUNTER SYMPTOMS
NAUSEA: 0
SHORTNESS OF BREATH: 0
DIARRHEA: 0
CONSTIPATION: 0
BACK PAIN: 1

## 2024-05-24 NOTE — PROGRESS NOTES
Financial Resource Strain: Low Risk  (4/7/2024)    Overall Financial Resource Strain (CARDIA)     Difficulty of Paying Living Expenses: Not hard at all   Food Insecurity: No Food Insecurity (4/7/2024)    Hunger Vital Sign     Worried About Running Out of Food in the Last Year: Never true     Ran Out of Food in the Last Year: Never true   Transportation Needs: Unknown (4/7/2024)    PRAPARE - Transportation     Lack of Transportation (Medical): Not on file     Lack of Transportation (Non-Medical): No   Physical Activity: Inactive (8/29/2023)    Exercise Vital Sign     Days of Exercise per Week: 0 days     Minutes of Exercise per Session: 0 min   Stress: Not on file   Social Connections: Not on file   Intimate Partner Violence: Not on file   Housing Stability: Unknown (4/7/2024)    Housing Stability Vital Sign     Unable to Pay for Housing in the Last Year: Not on file     Number of Places Lived in the Last Year: Not on file     Unstable Housing in the Last Year: No       Current Outpatient Medications on File Prior to Visit   Medication Sig Dispense Refill    primidone (MYSOLINE) 250 MG tablet Take 1 tablet by mouth nightly 30 tablet 0    HYDROcodone-acetaminophen (NORCO)  MG per tablet Take 1 tablet by mouth every 6 hours as needed for Pain. Max Daily Amount: 4 tablets      tiZANidine (ZANAFLEX) 4 MG tablet Take 1 tablet by mouth every 6 hours as needed      levothyroxine (SYNTHROID) 88 MCG tablet Take 1 tablet by mouth daily 90 tablet 1    ammonium lactate (AMLACTIN) 12 % cream Apply topically daily, avoid applying between the toes. 385 g 5    DULoxetine (CYMBALTA) 30 MG extended release capsule TAKE 1 CAPSULE BY MOUTH EVERY DAY 90 capsule 1    busPIRone (BUSPAR) 10 MG tablet Take 1 tablet by mouth 3 times daily 270 tablet 1    lidocaine (LMX) 4 % cream Apply a half dollar sized amount to intact skin topically up to twice daily as needed for pain 45 g 1    atorvastatin (LIPITOR) 40 MG tablet TAKE 1 TABLET

## 2024-05-24 NOTE — PATIENT INSTRUCTIONS
Please follow up with primary care team/Endocrinology regarding osteopenia seen on XR C Spine to see if any further evaluation and treatment is needed.

## 2024-05-28 RX ORDER — PRIMIDONE 250 MG/1
TABLET ORAL
Qty: 30 TABLET | Refills: 0 | Status: SHIPPED | OUTPATIENT
Start: 2024-05-28 | End: 2024-07-08

## 2024-05-28 NOTE — TELEPHONE ENCOUNTER
Pharmacy is  requesting medication refill. Please approve or deny this request.    Rx requested:  Requested Prescriptions     Pending Prescriptions Disp Refills    primidone (MYSOLINE) 250 MG tablet [Pharmacy Med Name: Primidone 250 MG Oral Tablet] 30 tablet 0     Sig: Take 1 tablet by mouth nightly         Last Office Visit:   2/29/2024      Next Visit Date:  Future Appointments   Date Time Provider Department Center   7/24/2024  2:45 PM Bekah Singh MD [facility]   8/29/2024  3:45 PM David Pang MD [place] [facility] -   10/9/2024  2:45 PM Madalyn Bella DO [facility]

## 2024-05-29 ENCOUNTER — OFFICE VISIT (OUTPATIENT)
Dept: FAMILY MEDICINE CLINIC | Age: 74
End: 2024-05-29

## 2024-05-29 VITALS
WEIGHT: 192 LBS | TEMPERATURE: 97 F | BODY MASS INDEX: 26.88 KG/M2 | HEART RATE: 79 BPM | SYSTOLIC BLOOD PRESSURE: 112 MMHG | DIASTOLIC BLOOD PRESSURE: 60 MMHG | RESPIRATION RATE: 12 BRPM | OXYGEN SATURATION: 99 % | HEIGHT: 71 IN

## 2024-05-29 DIAGNOSIS — H10.32 ACUTE BACTERIAL CONJUNCTIVITIS OF LEFT EYE: Primary | ICD-10-CM

## 2024-05-29 RX ORDER — OFLOXACIN 3 MG/ML
1 SOLUTION/ DROPS OPHTHALMIC 4 TIMES DAILY
Qty: 1 EACH | Refills: 0 | Status: SHIPPED | OUTPATIENT
Start: 2024-05-29 | End: 2024-06-05

## 2024-05-29 ASSESSMENT — ENCOUNTER SYMPTOMS
EYE ITCHING: 1
DIARRHEA: 0
COUGH: 0
PHOTOPHOBIA: 0
SINUS PRESSURE: 0
VOMITING: 0
TROUBLE SWALLOWING: 0
VOICE CHANGE: 0
EYE DISCHARGE: 1
EYE PAIN: 1
SHORTNESS OF BREATH: 0
EYE REDNESS: 1
STRIDOR: 0
RHINORRHEA: 0
CHEST TIGHTNESS: 0
NAUSEA: 0
SORE THROAT: 0
ABDOMINAL PAIN: 0
WHEEZING: 0
SINUS PAIN: 0

## 2024-05-29 NOTE — PROGRESS NOTES
needed      levothyroxine (SYNTHROID) 88 MCG tablet Take 1 tablet by mouth daily 90 tablet 1    ammonium lactate (AMLACTIN) 12 % cream Apply topically daily, avoid applying between the toes. 385 g 5    DULoxetine (CYMBALTA) 30 MG extended release capsule TAKE 1 CAPSULE BY MOUTH EVERY DAY 90 capsule 1    busPIRone (BUSPAR) 10 MG tablet Take 1 tablet by mouth 3 times daily 270 tablet 1    lidocaine (LMX) 4 % cream Apply a half dollar sized amount to intact skin topically up to twice daily as needed for pain 45 g 1    atorvastatin (LIPITOR) 40 MG tablet TAKE 1 TABLET BY MOUTH EVERY NIGHT 90 tablet 3    hydrocortisone 2.5 % cream Apply topically 2 times daily. 453 g 0    Multiple Vitamins-Minerals (MENS 50+ MULTI VITAMIN/MIN PO) Take 1 tablet by mouth daily       Current Facility-Administered Medications   Medication Dose Route Frequency Provider Last Rate Last Admin    dexamethasone (PF) (DECADRON) injection 5 mg  5 mg IntraVENous Once Bekah Singh MD              Review of Systems   Constitutional:  Negative for activity change, appetite change, chills, diaphoresis, fatigue, fever and unexpected weight change.   HENT:  Negative for congestion, ear discharge, ear pain, postnasal drip, rhinorrhea, sinus pressure, sinus pain, sneezing, sore throat, tinnitus, trouble swallowing and voice change.    Eyes:  Positive for pain, discharge, redness and itching. Negative for photophobia and visual disturbance.   Respiratory:  Negative for cough, chest tightness, shortness of breath, wheezing and stridor.    Cardiovascular:  Negative for chest pain.   Gastrointestinal:  Negative for abdominal pain, diarrhea, nausea and vomiting.   Musculoskeletal:  Negative for arthralgias and myalgias.   Skin:  Negative for rash.   Neurological:  Negative for dizziness, weakness, light-headedness and headaches.   Hematological:  Negative for adenopathy.       Objective:   /60 (Site: Right Upper Arm, Position: Sitting, Cuff Size: Medium  Transmale/Female to Male

## 2024-06-17 DIAGNOSIS — E03.4 HYPOTHYROIDISM DUE TO ACQUIRED ATROPHY OF THYROID: ICD-10-CM

## 2024-06-17 RX ORDER — LEVOTHYROXINE SODIUM 88 UG/1
88 TABLET ORAL DAILY
Qty: 90 TABLET | Refills: 3 | Status: SHIPPED | OUTPATIENT
Start: 2024-06-17

## 2024-06-24 RX ORDER — BUSPIRONE HYDROCHLORIDE 10 MG/1
10 TABLET ORAL 3 TIMES DAILY
Qty: 270 TABLET | Refills: 0 | Status: SHIPPED | OUTPATIENT
Start: 2024-06-24

## 2024-06-24 RX ORDER — ATORVASTATIN CALCIUM 40 MG/1
TABLET, FILM COATED ORAL
Qty: 90 TABLET | Refills: 1 | Status: SHIPPED | OUTPATIENT
Start: 2024-06-24

## 2024-06-24 NOTE — TELEPHONE ENCOUNTER
Pharmacy requesting medication refill. Please approve or deny this request.    Rx requested:  Requested Prescriptions     Pending Prescriptions Disp Refills    atorvastatin (LIPITOR) 40 MG tablet [Pharmacy Med Name: Atorvastatin Calcium Oral Tablet 40 MG] 90 tablet 0     Sig: TAKE 1 TABLET BY MOUTH EVERY NIGHT         Last Office Visit:   4/10/2024      Next Visit Date:  Future Appointments   Date Time Provider Department Center   7/24/2024  2:45 PM [facility]   8/29/2024  3:45 PM David Pang MD [place] [facility] -   10/9/2024  2:45 PM [facility]

## 2024-06-24 NOTE — TELEPHONE ENCOUNTER
Pharmacy is  requesting medication refill. Please approve or deny this request.    Rx requested:  Requested Prescriptions     Pending Prescriptions Disp Refills    busPIRone (BUSPAR) 10 MG tablet [Pharmacy Med Name: busPIRone HCl Oral Tablet 10 MG] 270 tablet 0     Sig: TAKE 1 TABLET BY MOUTH 3 TIMES A DAY         Last Office Visit:   2/29/2024      Next Visit Date:  Future Appointments   Date Time Provider Department Center   7/24/2024  2:45 PM [facility]   8/29/2024  3:45 PM David Pang MD [place] [facility] -   10/9/2024  2:45 PM Madalyn Bella DO [facility]

## 2024-07-08 RX ORDER — PRIMIDONE 250 MG/1
TABLET ORAL
Qty: 30 TABLET | Refills: 0 | Status: SHIPPED | OUTPATIENT
Start: 2024-07-08 | End: 2024-08-07

## 2024-07-08 NOTE — TELEPHONE ENCOUNTER
Pharmacy is  requesting medication refill. Please approve or deny this request.    Rx requested:  Requested Prescriptions     Pending Prescriptions Disp Refills    primidone (MYSOLINE) 250 MG tablet [Pharmacy Med Name: Primidone 250 MG Oral Tablet] 30 tablet 0     Sig: Take 1 tablet by mouth nightly         Last Office Visit:   2/29/2024      Next Visit Date:  Future Appointments   Date Time Provider Department Center   7/24/2024  2:45 PM Bekah Singh MD [place] [facility]   8/29/2024  3:45 PM David aPng MD [place] [facility] -   10/9/2024  2:45 PM Madalyn Bella DO [facility]

## 2024-07-24 ENCOUNTER — OFFICE VISIT (OUTPATIENT)
Dept: PAIN MANAGEMENT | Age: 74
End: 2024-07-24

## 2024-07-24 VITALS
SYSTOLIC BLOOD PRESSURE: 118 MMHG | HEIGHT: 71 IN | TEMPERATURE: 97.3 F | DIASTOLIC BLOOD PRESSURE: 78 MMHG | WEIGHT: 192 LBS | BODY MASS INDEX: 26.88 KG/M2

## 2024-07-24 DIAGNOSIS — M47.817 LUMBOSACRAL SPONDYLOSIS WITHOUT MYELOPATHY: ICD-10-CM

## 2024-07-24 DIAGNOSIS — M47.814 THORACIC SPONDYLOSIS: Primary | ICD-10-CM

## 2024-07-24 DIAGNOSIS — M48.10 DISH (DIFFUSE IDIOPATHIC SKELETAL HYPEROSTOSIS): ICD-10-CM

## 2024-07-24 ASSESSMENT — ENCOUNTER SYMPTOMS
DIARRHEA: 0
BACK PAIN: 1
CONSTIPATION: 0
NAUSEA: 0
SHORTNESS OF BREATH: 0

## 2024-07-24 NOTE — PROGRESS NOTES
physician and/or specialists as required for his overall health and management of his comorbidities as well as any new positive symptoms mentioned in review of systems above. Care was provided within the definitions and limitations of our specialty practice. Encouraged lifestyle interventions including healthy habits, lifestyle changes, regular aerobic exercise and appropriate weight maintenance as advised by their primary care physician or cardiovascular health provider. Discussed well care and disease prevention/maintenance. Anatomic spine model was used to illustrate pathology.     All recommendations for therapy are provided to improve function with activities of daily living, decrease pain, and help develop an exercise program. All recommendations for medications are meant to help decrease pain, improve function with activities of daily living, maintain compliance with home exercise program, and improve quality of life. All recommendations for therapeutic injections are meant to help decrease pain, improve function with activities of daily living, maintain compliance with home exercise program, improve quality of life, and decrease reliance upon oral medications. All recommendations for diagnostic injections are meant to help assess the hypothesis that the targeted structure is a significant pain generator that limits the patient's function, causes pain, and reduces his quality of life.    Encouraged compliance with his home exercise program. Recommended compliance with physical therapy program as outlined above.     Discussed the elevated risks of excessive sedation while on pain medications. Advised him against driving or operating heavy machinery or performing any activities where he may harm himself or others while on pain medications. Particular caution was emphasized especially during dose adjustments and medication changes. Discussed the elevated risks of respiratory depression and death while on opioid

## 2024-08-06 NOTE — TELEPHONE ENCOUNTER
Requesting medication refill. Please approve or deny this request.    Rx requested:  Requested Prescriptions     Pending Prescriptions Disp Refills    primidone (MYSOLINE) 250 MG tablet [Pharmacy Med Name: Primidone 250 MG Oral Tablet] 30 tablet 0     Sig: Take 1 tablet by mouth nightly         Last Office Visit:   2/29/2024      Next Visit Date:  Future Appointments   Date Time Provider Department Center   8/29/2024  3:45 PM David Pang MD [place] [facility] -   9/18/2024  2:45 PM [facility]   10/9/2024  2:45 PM Madalyn Bella, DO [facility]               Last refill 7/8/24. Please approve or deny.

## 2024-08-07 RX ORDER — PRIMIDONE 250 MG/1
TABLET ORAL
Qty: 30 TABLET | Refills: 0 | Status: SHIPPED | OUTPATIENT
Start: 2024-08-07 | End: 2024-09-06

## 2024-08-21 ENCOUNTER — TELEPHONE (OUTPATIENT)
Dept: PAIN MANAGEMENT | Age: 74
End: 2024-08-21

## 2024-08-21 NOTE — TELEPHONE ENCOUNTER
I spoke with patient today concerning his pain relief/improvement following 11- Thoracic MBB.  He states he had 80-85% pain relief immediately following the procedure.  His pain slowly returned after the procedure.

## 2024-08-27 ENCOUNTER — TELEPHONE (OUTPATIENT)
Dept: PAIN MANAGEMENT | Age: 74
End: 2024-08-27

## 2024-08-27 NOTE — TELEPHONE ENCOUNTER
CALLED PT TO SCHEDULE : VOICE MAIL NOT SET UP    Bilateral L4/5 MBB approved 9-4-2024 to 1-2-2025        TO BE SCHEDULED WITH DR. TOBIN

## 2024-08-27 NOTE — TELEPHONE ENCOUNTER
Bilateral L4/5 MBB approved 9-4-2024 to 1-2-2025.  Referral #[id number]    OK to schedule procedure approved as above.   Please note sides/levels approved and date range.   (If applicable, sides/levels approved may differ from those ordered)    To be scheduled with Dr. Singh.

## 2024-08-29 ENCOUNTER — OFFICE VISIT (OUTPATIENT)
Dept: NEUROLOGY | Age: 74
End: 2024-08-29
Payer: MEDICARE

## 2024-08-29 VITALS
SYSTOLIC BLOOD PRESSURE: 102 MMHG | WEIGHT: 198 LBS | DIASTOLIC BLOOD PRESSURE: 60 MMHG | BODY MASS INDEX: 27.62 KG/M2 | HEART RATE: 86 BPM

## 2024-08-29 DIAGNOSIS — M47.12 CERVICAL ARTHRITIS WITH MYELOPATHY: ICD-10-CM

## 2024-08-29 DIAGNOSIS — G25.0 ESSENTIAL TREMOR: Primary | ICD-10-CM

## 2024-08-29 DIAGNOSIS — M96.1 FAILED BACK SYNDROME OF LUMBAR SPINE: ICD-10-CM

## 2024-08-29 DIAGNOSIS — M48.062 SPINAL STENOSIS OF LUMBAR REGION WITH NEUROGENIC CLAUDICATION: ICD-10-CM

## 2024-08-29 PROCEDURE — 1123F ACP DISCUSS/DSCN MKR DOCD: CPT | Performed by: PSYCHIATRY & NEUROLOGY

## 2024-08-29 PROCEDURE — 99214 OFFICE O/P EST MOD 30 MIN: CPT | Performed by: PSYCHIATRY & NEUROLOGY

## 2024-08-29 PROCEDURE — G8427 DOCREV CUR MEDS BY ELIG CLIN: HCPCS | Performed by: PSYCHIATRY & NEUROLOGY

## 2024-08-29 PROCEDURE — 1036F TOBACCO NON-USER: CPT | Performed by: PSYCHIATRY & NEUROLOGY

## 2024-08-29 PROCEDURE — G8417 CALC BMI ABV UP PARAM F/U: HCPCS | Performed by: PSYCHIATRY & NEUROLOGY

## 2024-08-29 RX ORDER — HYDROCODONE BITARTRATE AND ACETAMINOPHEN 10; 325 MG/1; MG/1
1 TABLET ORAL EVERY 6 HOURS PRN
Qty: 30 TABLET | Refills: 0 | Status: CANCELLED | OUTPATIENT
Start: 2024-08-29 | End: 2024-09-28

## 2024-08-29 RX ORDER — HYDROCODONE BITARTRATE AND ACETAMINOPHEN 10; 325 MG/1; MG/1
1 TABLET ORAL DAILY PRN
Qty: 30 TABLET | Refills: 0 | Status: SHIPPED | OUTPATIENT
Start: 2024-08-29 | End: 2024-09-28

## 2024-08-29 NOTE — PROGRESS NOTES
Subjective:      Patient ID: Higinio Smith is a 74 y.o. male who presents today for:  Chief Complaint   Patient presents with    6 Month Follow-Up     Pt c/o of increased back, side, and neck pain. Pt states no falls. Pt states his tremors have been under control.        HPI 74-year-old gentleman with history of essential tremors and lumbar radiculopathy lumbar canal stenosis.  Patient continues on Mysoline 250 mg.  Patient has not developed any parkinsonian features.  Patient continues on hydrocodone for pain and he has been seeing pain management for quite some time.  Patient is somewhat hyperreflexic in the neck and therefore an MRI of the cervical spine.  Patient has moderate degree of central canal stenosis at C3-4.  There is mild degree of myelopathy but not quite compressive for surgical intervention.  Patient is being referred to rheumatology as well for joint pains.  This referral was actually supposed be made by pain management and he was recommend to go to [place].  I see this in the note in May 2024    After I see him last we had increased his hydrocodone to 10 mg daily which takes him through the day.  This is a quality-of-life stress nothing else is helping.  The injections do help which he is taking.    Past Medical History:   Diagnosis Date    Anemia     Anxiety, generalized     BPH (benign prostatic hyperplasia)     CRF (chronic renal failure)     stage 3 -   5/24/17 patient denies    Depression     Has a tremor     HIGH CHOLESTEROL     Hypothyroid     IBD (inflammatory bowel disease)     Insomnia     Kidney stones     Seasonal allergic conjunctivitis     Tinnitus     chronic      Past Surgical History:   Procedure Laterality Date    CATARACT REMOVAL Bilateral     COLONOSCOPY      KNEE SURGERY      Bilat- knee,legs    SD COLON CA SCRN NOT HI RSK IND N/A 05/24/2017    COLONOSCOPY performed by Ray Mendosa MD at [facility]    SHOULDER SURGERY      L shoulder repair    THYROID

## 2024-08-30 ENCOUNTER — TELEPHONE (OUTPATIENT)
Dept: NEUROLOGY | Age: 74
End: 2024-08-30

## 2024-08-30 DIAGNOSIS — M48.062 SPINAL STENOSIS OF LUMBAR REGION WITH NEUROGENIC CLAUDICATION: Primary | ICD-10-CM

## 2024-08-30 NOTE — TELEPHONE ENCOUNTER
Per phone encounter.        Requesting medication refill. Please approve or deny this request.    Rx requested:  Requested Prescriptions     Pending Prescriptions Disp Refills    HYDROcodone-acetaminophen (NORCO) 5-325 MG per tablet 30 tablet 0     Sig: Take 1 tablet by mouth daily as needed for Pain for up to 30 days. Intended supply: 3 days. Take lowest dose possible to manage pain Max Daily Amount: 1 tablet         Last Office Visit:   8/29/2024      Next Visit Date:  Future Appointments   Date Time Provider Department Center   9/18/2024  2:45 PM Bekah Singh MD [facility]   10/9/2024  2:45 PM Madalyn Bella DO [facility]   2/27/2025  3:00 PM David Pang MD [place] [facility] -

## 2024-08-31 RX ORDER — HYDROCODONE BITARTRATE AND ACETAMINOPHEN 5; 325 MG/1; MG/1
1 TABLET ORAL DAILY PRN
Qty: 30 TABLET | Refills: 0 | Status: SHIPPED | OUTPATIENT
Start: 2024-08-31 | End: 2024-09-30

## 2024-09-06 RX ORDER — PRIMIDONE 250 MG/1
TABLET ORAL
Qty: 30 TABLET | Refills: 0 | Status: SHIPPED | OUTPATIENT
Start: 2024-09-06 | End: 2024-10-09

## 2024-09-06 NOTE — TELEPHONE ENCOUNTER
Requesting medication refill. Please approve or deny this request.    Rx requested:  Requested Prescriptions     Pending Prescriptions Disp Refills    primidone (MYSOLINE) 250 MG tablet [Pharmacy Med Name: Primidone 250 MG Oral Tablet] 30 tablet 0     Sig: Take 1 tablet by mouth nightly         Last Office Visit:   8/29/2024      Next Visit Date:  Future Appointments   Date Time Provider Department Center   9/18/2024  2:45 PM [facility]   10/9/2024  2:45 PM Madalyn Bella DO [facility] DEP   2/27/2025  3:00 PM David Pang MD [place] [facility] Neurology -               Last refill 8/7/24. Please approve or deny.

## 2024-09-18 ENCOUNTER — OFFICE VISIT (OUTPATIENT)
Age: 74
End: 2024-09-18
Payer: MEDICARE

## 2024-09-18 VITALS
WEIGHT: 192 LBS | SYSTOLIC BLOOD PRESSURE: 126 MMHG | DIASTOLIC BLOOD PRESSURE: 82 MMHG | BODY MASS INDEX: 26.88 KG/M2 | HEIGHT: 71 IN

## 2024-09-18 DIAGNOSIS — M47.814 THORACIC SPONDYLOSIS: ICD-10-CM

## 2024-09-18 DIAGNOSIS — M54.51 VERTEBROGENIC LOW BACK PAIN: ICD-10-CM

## 2024-09-18 DIAGNOSIS — M47.817 LUMBOSACRAL SPONDYLOSIS WITHOUT MYELOPATHY: Primary | ICD-10-CM

## 2024-09-18 PROCEDURE — 1123F ACP DISCUSS/DSCN MKR DOCD: CPT | Performed by: PHYSICAL MEDICINE & REHABILITATION

## 2024-09-18 PROCEDURE — 1036F TOBACCO NON-USER: CPT | Performed by: PHYSICAL MEDICINE & REHABILITATION

## 2024-09-18 PROCEDURE — 99214 OFFICE O/P EST MOD 30 MIN: CPT | Performed by: PHYSICAL MEDICINE & REHABILITATION

## 2024-09-18 PROCEDURE — G8427 DOCREV CUR MEDS BY ELIG CLIN: HCPCS | Performed by: PHYSICAL MEDICINE & REHABILITATION

## 2024-09-18 PROCEDURE — G8417 CALC BMI ABV UP PARAM F/U: HCPCS | Performed by: PHYSICAL MEDICINE & REHABILITATION

## 2024-09-18 PROCEDURE — 99215 OFFICE O/P EST HI 40 MIN: CPT

## 2024-09-18 ASSESSMENT — ENCOUNTER SYMPTOMS
CONSTIPATION: 0
NAUSEA: 0
SHORTNESS OF BREATH: 0
BACK PAIN: 1
DIARRHEA: 0

## 2024-09-25 ENCOUNTER — TELEPHONE (OUTPATIENT)
Age: 74
End: 2024-09-25

## 2024-10-01 ENCOUNTER — TELEPHONE (OUTPATIENT)
Age: 74
End: 2024-10-01

## 2024-10-01 NOTE — TELEPHONE ENCOUNTER
Bilateral L1/2/3 RFA approved 10-9-2024 to 2-6-2025.  Referral #[id number]    OK to schedule procedure approved as above.   Please note sides/levels approved and date range.   (If applicable, sides/levels approved may differ from those ordered)    To be scheduled with Dr. Singh.

## 2024-10-09 ENCOUNTER — OFFICE VISIT (OUTPATIENT)
Dept: FAMILY MEDICINE CLINIC | Age: 74
End: 2024-10-09

## 2024-10-09 VITALS
WEIGHT: 197 LBS | TEMPERATURE: 97.3 F | DIASTOLIC BLOOD PRESSURE: 70 MMHG | BODY MASS INDEX: 27.58 KG/M2 | HEIGHT: 71 IN | HEART RATE: 78 BPM | OXYGEN SATURATION: 96 % | SYSTOLIC BLOOD PRESSURE: 102 MMHG

## 2024-10-09 VITALS
BODY MASS INDEX: 27.58 KG/M2 | HEART RATE: 78 BPM | TEMPERATURE: 97.3 F | OXYGEN SATURATION: 96 % | WEIGHT: 197 LBS | SYSTOLIC BLOOD PRESSURE: 102 MMHG | DIASTOLIC BLOOD PRESSURE: 70 MMHG | HEIGHT: 71 IN

## 2024-10-09 DIAGNOSIS — N18.30 STAGE 3 CHRONIC KIDNEY DISEASE, UNSPECIFIED WHETHER STAGE 3A OR 3B CKD (HCC): ICD-10-CM

## 2024-10-09 DIAGNOSIS — E78.00 HYPERCHOLESTEROLEMIA: ICD-10-CM

## 2024-10-09 DIAGNOSIS — E03.4 HYPOTHYROIDISM DUE TO ACQUIRED ATROPHY OF THYROID: Primary | ICD-10-CM

## 2024-10-09 DIAGNOSIS — F41.1 ANXIETY, GENERALIZED: ICD-10-CM

## 2024-10-09 DIAGNOSIS — Z00.00 MEDICARE ANNUAL WELLNESS VISIT, SUBSEQUENT: Primary | ICD-10-CM

## 2024-10-09 DIAGNOSIS — F34.1 PERSISTENT DEPRESSIVE DISORDER: ICD-10-CM

## 2024-10-09 DIAGNOSIS — G47.00 INSOMNIA, UNSPECIFIED TYPE: ICD-10-CM

## 2024-10-09 DIAGNOSIS — L30.9 DERMATITIS: ICD-10-CM

## 2024-10-09 RX ORDER — PRIMIDONE 250 MG/1
TABLET ORAL
Qty: 30 TABLET | Refills: 0 | Status: SHIPPED | OUTPATIENT
Start: 2024-10-09 | End: 2024-11-05

## 2024-10-09 RX ORDER — HYDROCODONE BITARTRATE AND ACETAMINOPHEN 5; 325 MG/1; MG/1
1 TABLET ORAL EVERY 6 HOURS PRN
COMMUNITY

## 2024-10-09 RX ORDER — HYDROCORTISONE 2.5 %
CREAM (GRAM) TOPICAL
Qty: 453 G | Refills: 2 | Status: SHIPPED | OUTPATIENT
Start: 2024-10-09

## 2024-10-09 ASSESSMENT — PATIENT HEALTH QUESTIONNAIRE - PHQ9
SUM OF ALL RESPONSES TO PHQ9 QUESTIONS 1 & 2: 2
9. THOUGHTS THAT YOU WOULD BE BETTER OFF DEAD, OR OF HURTING YOURSELF: NOT AT ALL
7. TROUBLE CONCENTRATING ON THINGS, SUCH AS READING THE NEWSPAPER OR WATCHING TELEVISION: SEVERAL DAYS
SUM OF ALL RESPONSES TO PHQ QUESTIONS 1-9: 9
2. FEELING DOWN, DEPRESSED OR HOPELESS: SEVERAL DAYS
SUM OF ALL RESPONSES TO PHQ QUESTIONS 1-9: 9
4. FEELING TIRED OR HAVING LITTLE ENERGY: SEVERAL DAYS
1. LITTLE INTEREST OR PLEASURE IN DOING THINGS: SEVERAL DAYS
5. POOR APPETITE OR OVEREATING: SEVERAL DAYS
3. TROUBLE FALLING OR STAYING ASLEEP: MORE THAN HALF THE DAYS
SUM OF ALL RESPONSES TO PHQ QUESTIONS 1-9: 9
8. MOVING OR SPEAKING SO SLOWLY THAT OTHER PEOPLE COULD HAVE NOTICED. OR THE OPPOSITE, BEING SO FIGETY OR RESTLESS THAT YOU HAVE BEEN MOVING AROUND A LOT MORE THAN USUAL: NOT AT ALL
10. IF YOU CHECKED OFF ANY PROBLEMS, HOW DIFFICULT HAVE THESE PROBLEMS MADE IT FOR YOU TO DO YOUR WORK, TAKE CARE OF THINGS AT HOME, OR GET ALONG WITH OTHER PEOPLE: SOMEWHAT DIFFICULT
SUM OF ALL RESPONSES TO PHQ QUESTIONS 1-9: 9

## 2024-10-09 ASSESSMENT — LIFESTYLE VARIABLES
HOW OFTEN DO YOU HAVE A DRINK CONTAINING ALCOHOL: NEVER
HOW MANY STANDARD DRINKS CONTAINING ALCOHOL DO YOU HAVE ON A TYPICAL DAY: PATIENT DOES NOT DRINK

## 2024-10-09 NOTE — PATIENT INSTRUCTIONS
various assessments and screenings as appropriate.    After reviewing your medical record and screening and assessments performed today your provider may have ordered immunizations, labs, imaging, and/or referrals for you.  A list of these orders (if applicable) as well as your Preventive Care list are included within your After Visit Summary for your review.    Other Preventive Recommendations:    A preventive eye exam performed by an eye specialist is recommended every 1-2 years to screen for glaucoma; cataracts, macular degeneration, and other eye disorders.  A preventive dental visit is recommended every 6 months.  Try to get at least 150 minutes of exercise per week or 10,000 steps per day on a pedometer .  Order or download the FREE \"Exercise & Physical Activity: Your Everyday Guide\" from [employer] [place] [employer]. Call 1-291.261.6732 or search [employer] [place] [employer] online.  You need 9732-7272 mg of calcium and 2462-8152 IU of vitamin D per day. It is possible to meet your calcium requirement with diet alone, but a vitamin D supplement is usually necessary to meet this goal.  When exposed to the sun, use a sunscreen that protects against both UVA and UVB radiation with an SPF of 30 or greater. Reapply every 2 to 3 hours or after sweating, drying off with a towel, or swimming.  Always wear a seat belt when traveling in a car. Always wear a helmet when riding a bicycle or motorcycle.

## 2024-10-09 NOTE — PROGRESS NOTES
Subjective  Higinio Smith, 74 y.o. male presents today with:  Chief Complaint   Patient presents with    6 Month Follow-Up    Depression     Does occasionally feel down & depressed. States he stopped taking Cymbalta because he felt like it was making his mood worse.     Anxiety     Is managing. Feels Buspar helps when needed.     Insomnia     States some days he sleeps good & other times he has a hard time sleeping.     Chronic Kidney Disease     Labs done 4/10/24.     Hypothyroidism     Labs done 4/10/24. Taking medication as directed, no side effects.     Hyperlipidemia     Lipid panel done 4/10/24. Taking medication as directed, no side effects.       Health Maintenance     Refuses colonoscopy, cologuard & FIT test.       Patient with 6-month follow-up appointment.    Patient with hypothyroidism.  Currently on levothyroxine 88 mcg daily.  Tolerating well.  No changes to hair skin or nails.  No heat or cold intolerance.  Up-to-date on labs.  Tolerating well.    Patient also with anxiety and depression.  He took himself off the Cymbalta.  He is currently only doing the BuSpar 10 mg 3 times daily.  Gets this from neurology.  Doing well with it.  No side effects.  He does occasionally feel down and depressed but does not feel like he needs any additional medication at this time.    Patient also with hypercholesterolemia.  He is on atorvastatin 40 mg daily.  Tolerating well.  No muscle aches or pains.    Patient also with stage III chronic kidney disease.  Has been stable.  Up-to-date on labs.    Patient also with insomnia.  He states some days are good and some days not so good.  Does not wish to take any additional medication at this time.  He has no other concerns at this time.      Review of Systems   Constitutional:  Negative for chills, fatigue, fever and unexpected weight change.   HENT:  Negative for congestion, rhinorrhea and sore throat.    Eyes:  Negative for discharge.   Respiratory:  Negative for

## 2024-10-09 NOTE — TELEPHONE ENCOUNTER
Requesting medication refill. Please approve or deny this request.    Rx requested:  Requested Prescriptions     Pending Prescriptions Disp Refills    primidone (MYSOLINE) 250 MG tablet [Pharmacy Med Name: Primidone 250 MG Oral Tablet] 30 tablet 0     Sig: Take 1 tablet by mouth nightly         Last Office Visit:   8/29/2024      Next Visit Date:  Future Appointments   Date Time Provider Department Center   10/9/2024  2:45 PM Madalyn Bella, DO [facility]   10/22/2024  3:00 PM Bekah Singh MD [facility]   11/13/2024  2:45 PM Bekah Singh MD [facility]   11/21/2024  2:00 PM Bekah Singh MD [facility]   2/27/2025  3:00 PM David Pang MD [place] [facility] Neurology -               Last refill 9/6/24. Please approve or deny.

## 2024-10-09 NOTE — PROGRESS NOTES
Medicare Annual Wellness Visit    Higinio Smith is here for Medicare AWV    Assessment & Plan   Medicare annual wellness visit, subsequent  Recommendations for Preventive Services Due: see orders and patient instructions/AVS.  Recommended screening schedule for the next 5-10 years is provided to the patient in written form: see Patient Instructions/AVS.     No follow-ups on file.     Subjective       Patient's complete Health Risk Assessment and screening values have been reviewed and are found in Flowsheets. The following problems were reviewed today and where indicated follow up appointments were made and/or referrals ordered.    Positive Risk Factor Screenings with Interventions:        Depression:  PHQ-2 Score: 2  PHQ-9 Total Score: 9  Total Score Interpretation: 5-9 = mild depression  Interventions:  Patient declines any further evaluation or treatment       Controlled Medication Review:    Today's Pain Level: No data recorded   Opioid Risk: (Low risk score <55) Opioid risk score: 11    Patient is low risk for opioid use disorder or overdose.    Last PDMP Gopi as Reviewed:  Review User Review Instant Review Result   OSCAR ZARATE 10/9/2024 10:54 AM     Reviewed PDMP [1]     Last Controlled Substance Monitoring Documentation      Flowsheet Row Refill from 10/9/2024 in [facility] Neurology   Periodic Controlled Substance Monitoring No signs of potential drug abuse or diversion identified. filed at 10/09/2024 1054   All MEDD Reviewed of previous treatment and response to treatment, patients adherence to medication and non-medication treatment filed at 10/09/2024 1054   Chronic Pain > 50 MEDD Re-evaluated the status of the patient's underlying condition causing pain. filed at 10/09/2024 1054               Inactivity:  On average, how many days per week do you engage in moderate to strenuous exercise (like a brisk walk)?: 0 days (!) Abnormal  On average, how many minutes do you engage in exercise

## 2024-10-11 ASSESSMENT — ENCOUNTER SYMPTOMS
WHEEZING: 0
EYE DISCHARGE: 0
RHINORRHEA: 0
SHORTNESS OF BREATH: 0
NAUSEA: 0
COUGH: 0
DIARRHEA: 0
ABDOMINAL PAIN: 0
VOMITING: 0
SORE THROAT: 0

## 2024-10-23 RX ORDER — BUSPIRONE HYDROCHLORIDE 10 MG/1
10 TABLET ORAL 3 TIMES DAILY
Qty: 270 TABLET | Refills: 0 | Status: SHIPPED | OUTPATIENT
Start: 2024-10-23

## 2024-10-23 NOTE — TELEPHONE ENCOUNTER
Requesting medication refill. Please approve or deny this request.    Rx requested:  Requested Prescriptions     Pending Prescriptions Disp Refills    busPIRone (BUSPAR) 10 MG tablet [Pharmacy Med Name: busPIRone HCl Oral Tablet 10 MG] 270 tablet 0     Sig: TAKE 1 TABLET BY MOUTH 3 TIMES A DAY         Last Office Visit:   8/29/2024      Next Visit Date:  Future Appointments   Date Time Provider Department Center   11/13/2024  2:45 PM [facility]   11/21/2024  2:00 PM [facility]   12/3/2024  2:30 PM [facility]   2/27/2025  3:00 PM David Pang MD [place] [facility] Neu[facility] -   4/9/2025  2:45 PM Madalyn Bella, DO [facility] BS ECC DEP               Last refill 6/24/24. Please approve or deny.

## 2024-10-28 ENCOUNTER — NURSE ONLY (OUTPATIENT)
Dept: FAMILY MEDICINE CLINIC | Age: 74
End: 2024-10-28
Payer: MEDICARE

## 2024-10-28 DIAGNOSIS — Z23 NEED FOR INFLUENZA VACCINATION: Primary | ICD-10-CM

## 2024-10-28 PROCEDURE — G0008 ADMIN INFLUENZA VIRUS VAC: HCPCS | Performed by: STUDENT IN AN ORGANIZED HEALTH CARE EDUCATION/TRAINING PROGRAM

## 2024-10-28 PROCEDURE — 90653 IIV ADJUVANT VACCINE IM: CPT | Performed by: STUDENT IN AN ORGANIZED HEALTH CARE EDUCATION/TRAINING PROGRAM

## 2024-11-05 RX ORDER — PRIMIDONE 250 MG/1
TABLET ORAL
Qty: 30 TABLET | Refills: 0 | Status: SHIPPED | OUTPATIENT
Start: 2024-11-05 | End: 2024-12-09

## 2024-11-05 NOTE — TELEPHONE ENCOUNTER
Requesting medication refill. Please approve or deny this request.    Rx requested:  Requested Prescriptions     Pending Prescriptions Disp Refills    primidone (MYSOLINE) 250 MG tablet [Pharmacy Med Name: Primidone 250 MG Oral Tablet] 30 tablet 0     Sig: Take 1 tablet by mouth nightly         Last Office Visit:   8/29/2024      Next Visit Date:  Future Appointments   Date Time Provider Department Center   11/21/2024  2:00 PM Bekah Singh MD [facility]   12/3/2024  2:30 PM Bekah Singh MD [facility]   12/11/2024  3:15 PM Bekah Singh MD [facility]   2/27/2025  3:00 PM David Pang MD [place] [facility] Neu[facility] -   4/9/2025  2:45 PM Madalyn Bella, DO [facility] ECC DEP               Last refill 10/9/24. Please approve or deny.       English

## 2024-11-19 DIAGNOSIS — M48.062 SPINAL STENOSIS OF LUMBAR REGION WITH NEUROGENIC CLAUDICATION: ICD-10-CM

## 2024-11-19 NOTE — TELEPHONE ENCOUNTER
Requesting medication refill. Please approve or deny this request.    Rx requested:  Requested Prescriptions     Pending Prescriptions Disp Refills    HYDROcodone-acetaminophen (NORCO) 5-325 MG per tablet 30 tablet 0     Sig: Take 1 tablet by mouth daily as needed for Pain for up to 30 days. Intended supply: 3 days. Take lowest dose possible to manage pain Max Daily Amount: 1 tablet         Last Office Visit:   8/29/2024      Next Visit Date:  Future Appointments   Date Time Provider Department Center   11/21/2024  2:00 PM [facility]   12/3/2024  2:30 PM [facility]   12/11/2024  3:15 PM Bekah Singh MD [facility]   2/27/2025  3:00 PM David Pang MD [place] [facility] Neu[facility] -   4/9/2025  2:45 PM Madalyn Bella,  [facility]CC DEP               Last refill 8/31/24. Please approve or deny.

## 2024-11-20 RX ORDER — HYDROCODONE BITARTRATE AND ACETAMINOPHEN 5; 325 MG/1; MG/1
1 TABLET ORAL DAILY PRN
Qty: 30 TABLET | Refills: 0 | Status: SHIPPED | OUTPATIENT
Start: 2024-11-20 | End: 2024-12-20

## 2024-11-21 ENCOUNTER — OFFICE VISIT (OUTPATIENT)
Age: 74
End: 2024-11-21
Payer: MEDICARE

## 2024-11-21 VITALS
HEIGHT: 71 IN | TEMPERATURE: 97.2 F | WEIGHT: 197 LBS | BODY MASS INDEX: 27.58 KG/M2 | OXYGEN SATURATION: 96 % | DIASTOLIC BLOOD PRESSURE: 72 MMHG | HEART RATE: 83 BPM | SYSTOLIC BLOOD PRESSURE: 130 MMHG

## 2024-11-21 DIAGNOSIS — M47.814 THORACIC SPONDYLOSIS: ICD-10-CM

## 2024-11-21 DIAGNOSIS — M47.817 LUMBOSACRAL SPONDYLOSIS WITHOUT MYELOPATHY: Primary | ICD-10-CM

## 2024-11-21 PROCEDURE — 64635 DESTROY LUMB/SAC FACET JNT: CPT | Performed by: PHYSICAL MEDICINE & REHABILITATION

## 2024-11-21 PROCEDURE — 64636 DESTROY L/S FACET JNT ADDL: CPT | Performed by: PHYSICAL MEDICINE & REHABILITATION

## 2024-11-21 RX ORDER — LIDOCAINE HYDROCHLORIDE 10 MG/ML
10 INJECTION, SOLUTION EPIDURAL; INFILTRATION; INTRACAUDAL; PERINEURAL ONCE
Status: COMPLETED | OUTPATIENT
Start: 2024-11-21 | End: 2024-11-21

## 2024-11-21 RX ORDER — BETAMETHASONE SODIUM PHOSPHATE AND BETAMETHASONE ACETATE 3; 3 MG/ML; MG/ML
3 INJECTION, SUSPENSION INTRA-ARTICULAR; INTRALESIONAL; INTRAMUSCULAR; SOFT TISSUE ONCE
Status: COMPLETED | OUTPATIENT
Start: 2024-11-21 | End: 2024-11-21

## 2024-11-21 RX ADMIN — LIDOCAINE HYDROCHLORIDE 10 ML: 10 INJECTION, SOLUTION EPIDURAL; INFILTRATION; INTRACAUDAL; PERINEURAL at 15:25

## 2024-11-21 RX ADMIN — Medication 1 MEQ: at 15:26

## 2024-11-21 RX ADMIN — BETAMETHASONE SODIUM PHOSPHATE AND BETAMETHASONE ACETATE 3 MG: 3; 3 INJECTION, SUSPENSION INTRA-ARTICULAR; INTRALESIONAL; INTRAMUSCULAR at 15:25

## 2024-11-21 ASSESSMENT — PAIN SCALES - GENERAL: PAINLEVEL_OUTOF10: 4

## 2024-11-21 ASSESSMENT — PAIN DESCRIPTION - LOCATION: LOCATION: BACK

## 2024-11-21 NOTE — PROGRESS NOTES
Post-procedure instructions were given to the patient.       [x] Bilateral [x] L1    [x] L2   [] Right [x] L3    [] L4   [] Left [] L5           [facility], [facility] [address], [place] [address]  Phone [phone]/Fax [phone]

## 2024-11-21 NOTE — TELEPHONE ENCOUNTER
Requested Prescriptions     Pending Prescriptions Disp Refills    tiZANidine (ZANAFLEX) 4 MG tablet 30 tablet 0     Sig: Take 1 tablet by mouth nightly as needed (pain spasms) As needed for pain and spasms       Patient last seen on:  11/21/2024        Reason for request:  FORGOT TO ASK WHEN HE WAS HAVING HIS PROCEDURE       Next office visit date: 12/3/2024    Food, Lactose intolerance (gi), and Nsaids

## 2024-11-25 NOTE — TELEPHONE ENCOUNTER
Continue Tizanidine 4 mg qHS prn #30 no ref start 11/25/24. Avoid all other muscle relaxers and/or sedating medicines.

## 2024-12-02 ENCOUNTER — TELEPHONE (OUTPATIENT)
Age: 74
End: 2024-12-02

## 2024-12-02 NOTE — TELEPHONE ENCOUNTER
LMOM to cancel patients appointment on 12/3/24 with Dr Singh due to illness. Bilateral 4,5 MBB, auth date 9/4/24 to 1/2/25.

## 2024-12-09 RX ORDER — PRIMIDONE 250 MG/1
TABLET ORAL
Qty: 30 TABLET | Refills: 0 | Status: SHIPPED | OUTPATIENT
Start: 2024-12-09 | End: 2025-01-07

## 2024-12-09 NOTE — TELEPHONE ENCOUNTER
Requesting medication refill. Please approve or deny this request.    Rx requested:  Requested Prescriptions     Pending Prescriptions Disp Refills    primidone (MYSOLINE) 250 MG tablet [Pharmacy Med Name: Primidone 250 MG Oral Tablet] 30 tablet 0     Sig: Take 1 tablet by mouth nightly         Last Office Visit:   8/29/2024      Next Visit Date:  Future Appointments   Date Time Provider Department Center   1/2/2025  4:00 PM [facility]   2/27/2025  3:00 PM David Pang MD [place] [facility] Neu[facility] -   4/9/2025  2:45 PM Madalyn Bella DO [facility] BS ECC DEP               Last refill 11/5/24. Please approve or deny.

## 2024-12-30 RX ORDER — ATORVASTATIN CALCIUM 40 MG/1
TABLET, FILM COATED ORAL
Qty: 90 TABLET | Refills: 0 | OUTPATIENT
Start: 2024-12-30

## 2025-01-02 ENCOUNTER — PROCEDURE VISIT (OUTPATIENT)
Age: 75
End: 2025-01-02
Payer: MEDICARE

## 2025-01-02 ENCOUNTER — TELEPHONE (OUTPATIENT)
Dept: NEUROLOGY | Age: 75
End: 2025-01-02

## 2025-01-02 VITALS
HEIGHT: 71 IN | TEMPERATURE: 97.2 F | DIASTOLIC BLOOD PRESSURE: 68 MMHG | OXYGEN SATURATION: 99 % | SYSTOLIC BLOOD PRESSURE: 124 MMHG | BODY MASS INDEX: 27.58 KG/M2 | WEIGHT: 197 LBS | HEART RATE: 85 BPM

## 2025-01-02 DIAGNOSIS — M47.817 LUMBOSACRAL SPONDYLOSIS WITHOUT MYELOPATHY: Primary | ICD-10-CM

## 2025-01-02 DIAGNOSIS — M47.816 ARTHRITIS, LUMBAR SPINE: Primary | ICD-10-CM

## 2025-01-02 PROCEDURE — 64493 INJ PARAVERT F JNT L/S 1 LEV: CPT | Performed by: PHYSICAL MEDICINE & REHABILITATION

## 2025-01-02 RX ORDER — BETAMETHASONE SODIUM PHOSPHATE AND BETAMETHASONE ACETATE 3; 3 MG/ML; MG/ML
3 INJECTION, SUSPENSION INTRA-ARTICULAR; INTRALESIONAL; INTRAMUSCULAR; SOFT TISSUE ONCE
Status: COMPLETED | OUTPATIENT
Start: 2025-01-02 | End: 2025-01-02

## 2025-01-02 RX ORDER — BUPIVACAINE HYDROCHLORIDE 5 MG/ML
1.5 INJECTION, SOLUTION PERINEURAL ONCE
Status: COMPLETED | OUTPATIENT
Start: 2025-01-02 | End: 2025-01-02

## 2025-01-02 RX ORDER — LIDOCAINE HYDROCHLORIDE 10 MG/ML
4 INJECTION, SOLUTION EPIDURAL; INFILTRATION; INTRACAUDAL; PERINEURAL ONCE
Status: COMPLETED | OUTPATIENT
Start: 2025-01-02 | End: 2025-01-02

## 2025-01-02 RX ADMIN — BUPIVACAINE HYDROCHLORIDE 7.5 MG: 5 INJECTION, SOLUTION PERINEURAL at 16:19

## 2025-01-02 RX ADMIN — Medication 1 MEQ: at 16:20

## 2025-01-02 RX ADMIN — BETAMETHASONE SODIUM PHOSPHATE AND BETAMETHASONE ACETATE 3 MG: 3; 3 INJECTION, SUSPENSION INTRA-ARTICULAR; INTRALESIONAL; INTRAMUSCULAR at 16:18

## 2025-01-02 RX ADMIN — LIDOCAINE HYDROCHLORIDE 4 ML: 10 INJECTION, SOLUTION EPIDURAL; INFILTRATION; INTRACAUDAL; PERINEURAL at 16:19

## 2025-01-02 ASSESSMENT — PAIN DESCRIPTION - LOCATION
LOCATION: BACK
LOCATION: BACK

## 2025-01-02 ASSESSMENT — PAIN SCALES - GENERAL
PAINLEVEL_OUTOF10: 0
PAINLEVEL_OUTOF10: 7

## 2025-01-02 NOTE — PROGRESS NOTES
Diagnostic Lumbar Medial Branch Blocks      Patient Name: Higinio Smith   : 1950  Date: 2025     Provider: Bekah Singh MD      Procedure: Bilateral Lumbar L5/S1 diagnostic injections (B Lumbar L4 medial branch blocks and L5 dorsal ramus block)    Higinio Smith is here today for interventional pain management. Preoperatively, the patient presents with symptoms and physical exam findings consistent with lumbar facet zygapophyseal joint mediated pain. He has had persistent pain that limits his function and activities of daily living. The pain is persistent despite conservative measures. He has significant functional and psychological impairment due to this condition. Given his symptoms, physical exam findings, impairment in activities of daily living, and lack of response to conservative measures, consideration for lumbar medial branch blocks was given. Discussed the risks of the procedure including, but not limited to, bleeding, infection, worsened pain, damage to surrounding structures, side effects, toxicity, allergic reactions to medications used, immune and stress-response dysfunction, fat necrosis, avascular necrosis, skin pigmentation changes, blood sugar elevation, headache, vision changes, need for surgery, as well as catastrophic injury such as vision loss, paralysis, stroke, spinal cord infarction or injury, intrathecal injection, spinal cord puncture, arachnoiditis, bowel or bladder incontinence, loss of use of the legs, ventilator dependence, and death. Discussed the risks, benefits, alternative procedures, and alternatives to the procedure including no procedure at all. Discussed that we cannot undo any permanent neurologic damage or change the course of any underlying disease. After thorough discussion, patient expressed understanding and willingness to proceed. Written consent was obtained and is in the chart. Verbal consent to proceed was obtained.    Standard ASIPP

## 2025-01-02 NOTE — TELEPHONE ENCOUNTER
Patient was seen 8/29/2024 Dr. Pang forgot to put referral in to Rheumatology. Patient came into office inquiring about it.

## 2025-01-03 RX ORDER — ATORVASTATIN CALCIUM 40 MG/1
TABLET, FILM COATED ORAL
Qty: 90 TABLET | Refills: 3 | Status: SHIPPED | OUTPATIENT
Start: 2025-01-03

## 2025-01-07 RX ORDER — PRIMIDONE 250 MG/1
TABLET ORAL
Qty: 30 TABLET | Refills: 0 | Status: SHIPPED | OUTPATIENT
Start: 2025-01-07

## 2025-01-07 NOTE — TELEPHONE ENCOUNTER
Pharmacy is  requesting medication refill. Please approve or deny this request.    Rx requested:  Requested Prescriptions     Pending Prescriptions Disp Refills    primidone (MYSOLINE) 250 MG tablet [Pharmacy Med Name: Primidone 250 MG Oral Tablet] 30 tablet 0     Sig: Take 1 tablet by mouth nightly         Last Office Visit:   8/29/2024      Next Visit Date:  Future Appointments   Date Time Provider Department Center   2/27/2025  3:00 PM David Pang MD [place] [facility] Neu[facility] -   4/9/2025  2:45 PM Madalyn Bella DO [facility] DEP   ;

## 2025-01-31 DIAGNOSIS — M47.814 THORACIC SPONDYLOSIS: ICD-10-CM

## 2025-01-31 NOTE — TELEPHONE ENCOUNTER
Requested Prescriptions     Pending Prescriptions Disp Refills    tiZANidine (ZANAFLEX) 4 MG tablet 30 tablet 0     Sig: Take 1 tablet by mouth nightly as needed (pain spasms) As needed for pain and spasms       Patient last seen on:  1/2/25    Date of last surgery:  procedure 1/2/25    Date of last refill:  1/7/25    Reason for request:  patient is due     Request date for pharmacy pick-up:  1/31/25    Next office visit date: Visit date not found    Food, Lactose intolerance (gi), and Nsaids

## 2025-02-01 NOTE — TELEPHONE ENCOUNTER
-Continue Tizanidine 4 mg by mouth qHS, #30 no ref start 2/1/25. Avoid all other muscle relaxers and/or sedating medicines.   -Please schedule follow up with me

## 2025-02-03 RX ORDER — BUSPIRONE HYDROCHLORIDE 10 MG/1
10 TABLET ORAL 3 TIMES DAILY
Qty: 270 TABLET | Refills: 0 | Status: SHIPPED | OUTPATIENT
Start: 2025-02-03

## 2025-02-03 NOTE — TELEPHONE ENCOUNTER
Requesting medication refill. Please approve or deny this request.    Rx requested:  Requested Prescriptions     Pending Prescriptions Disp Refills    busPIRone (BUSPAR) 10 MG tablet [Pharmacy Med Name: busPIRone HCl Oral Tablet 10 MG] 270 tablet 0     Sig: TAKE 1 TABLET BY MOUTH 3 TIMES A DAY         Last Office Visit:   8/29/2024      Next Visit Date:  Future Appointments   Date Time Provider Department Center   2/12/2025  4:00 PM [facility]   2/27/2025  3:00 PM David Pang MD [place] [facility] Neu[facility] -   4/9/2025  2:45 PM Madalyn Bella DO [facility]               Last refill 10/23/24. Please approve or deny.

## 2025-02-09 ASSESSMENT — RHEUMATOLOGY NEW PATIENT QUESTIONNAIRE
LOSS OF CONSCIOUSNESS: N
WHEEZING: N
NIGHT SWEATS: N
UNEXPLAINED HEARING LOSS: N
VOMITING OF BLOOD OR COFFEE GROUND CONSISTENCY MATERIAL: N
PERSISTENT DIARRHEA: N
LOSS OF VISION: N
MORNING STIFFNESS IN LOWER BACK: N
BLACK STOOLS: N
DIFFICULTY SWALLOWING: N
RASH: N
RASH OR ULCERS: N
PAIN OR BURNING ON URINATION: N
EASY BRUISING: N
UNUSUAL BLEEDING: N
DEPRESSION: Y
NAUSEA: N
DIFFICULTY STAYING ASLEEP: N
AGITATION: Y
EYE REDNESS: N
DRYNESS OF MOUTH: N
EYE DRYNESS: N
HOARSE VOICE: N
SORES IN MOUTH OR NOSE: N
ANEMIA: N
BEHAVIORAL CHANGES: Y
SWOLLEN LEGS OR FEET: N
JAUNDICE: N
BLOOD IN STOOLS: N
DOUBLE OR BLURRED VISION: N
SHORTNESS OF BREATH: Y
MEMORY LOSS: Y
SKIN REDNESS: N
CHEST PAIN: N
HEARTBURN OR REFLUX: N
COUGHING OF BLOOD: N
COLOR CHANGES OF HANDS OR FEET IN THE COLD: N
MORNING STIFFNESS: Y
JOINT PAIN: Y
EASILY LOSING TEMPER: Y
SUN SENSITIVE (SUN ALLERGY): N
EXCESSIVE HAIR LOSS (MORE THAN YOUR NORM): N
NODULES/BUMPS: N
EYE PAIN: N
SWOLLEN OR TENDER GLANDS: N
COUGH: Y
ANXIETY: Y
SEIZURES: N
STOMACH PAIN: N
MUSCLE WEAKNESS: N
UNEXPLAINED WEIGHT CHANGE: N
HOW WOULD YOU DESCRIBE YOUR STIFFNESS ON AVERAGE: MODERATE
UNUSUAL FATIGUE: N
UNUSUALLY RAPID OR SLOWED HEART RATE: N
HEADACHES: Y
JOINT SWELLING: N
ABNORMAL URINE: N
INCREASED SUSCEPTIBILITY TO INFECTION: N
FEVER: N
DIFFICULTY FALLING ASLEEP: Y
NUMBNESS OR TINGLING IN HANDS OR FEET: Y
SKIN TIGHTNESS: N
DIFFICULTY BREATHING LYING DOWN: Y
FAINTING: N

## 2025-02-12 ENCOUNTER — OFFICE VISIT (OUTPATIENT)
Age: 75
End: 2025-02-12
Payer: MEDICARE

## 2025-02-12 VITALS
SYSTOLIC BLOOD PRESSURE: 108 MMHG | BODY MASS INDEX: 27.92 KG/M2 | HEART RATE: 95 BPM | DIASTOLIC BLOOD PRESSURE: 68 MMHG | HEIGHT: 70 IN | OXYGEN SATURATION: 96 % | WEIGHT: 195 LBS

## 2025-02-12 DIAGNOSIS — M54.2 CHRONIC NECK PAIN: ICD-10-CM

## 2025-02-12 DIAGNOSIS — G89.29 CHRONIC LOW BACK PAIN, UNSPECIFIED BACK PAIN LATERALITY, UNSPECIFIED WHETHER SCIATICA PRESENT: Primary | ICD-10-CM

## 2025-02-12 DIAGNOSIS — G89.29 CHRONIC NECK PAIN: ICD-10-CM

## 2025-02-12 DIAGNOSIS — M54.50 CHRONIC LOW BACK PAIN, UNSPECIFIED BACK PAIN LATERALITY, UNSPECIFIED WHETHER SCIATICA PRESENT: Primary | ICD-10-CM

## 2025-02-12 PROCEDURE — 1036F TOBACCO NON-USER: CPT | Performed by: INTERNAL MEDICINE

## 2025-02-12 PROCEDURE — G8417 CALC BMI ABV UP PARAM F/U: HCPCS | Performed by: INTERNAL MEDICINE

## 2025-02-12 PROCEDURE — G8427 DOCREV CUR MEDS BY ELIG CLIN: HCPCS | Performed by: INTERNAL MEDICINE

## 2025-02-12 PROCEDURE — 99203 OFFICE O/P NEW LOW 30 MIN: CPT | Performed by: INTERNAL MEDICINE

## 2025-02-12 PROCEDURE — 1123F ACP DISCUSS/DSCN MKR DOCD: CPT | Performed by: INTERNAL MEDICINE

## 2025-02-12 PROCEDURE — 99204 OFFICE O/P NEW MOD 45 MIN: CPT | Performed by: INTERNAL MEDICINE

## 2025-02-12 PROCEDURE — 1159F MED LIST DOCD IN RCRD: CPT | Performed by: INTERNAL MEDICINE

## 2025-02-12 PROCEDURE — 1160F RVW MEDS BY RX/DR IN RCRD: CPT | Performed by: INTERNAL MEDICINE

## 2025-02-12 NOTE — PATIENT INSTRUCTIONS
Your arthritis in your back is called diffuse idiopathic skeletal hyperostosis also known as DISH.  This is a degenerative form of arthritis and  cannot be reversed with medications.    Try using extra strength Tylenol, generic is fine, 2 tablets in the morning and 2 tablets in the afternoon for at least a week to see if that makes a difference.  Save your Norco for the stronger pain.    I would not recommend using over-the-counter medicine such as ibuprofen or Aleve since they can aggravate blood pressure and bother your kidneys.    Try finding those exercises that you did with physical therapy and start doing these again at least a couple of times a week to keep your back and legs stretched.    If your pain persists or get worse to follow-up with Dr. Singh.    If you do sit down try to use a firmer chair and get up every 15 to 20 minutes and walk around.    I will be happy to see you back in the future if your symptoms change.

## 2025-02-12 NOTE — PROGRESS NOTES
Reason for Referral:       David Pang MD  [address]  [address]  [place] [address]    Chief Complaint   Patient presents with    Establish Care     Patient presents with pain .           HISTORY OF PRESENT ILLNESS: Mr.Clayton Patel Smith is a 74 y.o. male referred for evaluation of chronic low back pain.  The patient was seen at the courtesy of Dr. Pang.  This very pleasant 74-year-old gentleman seen today accompanied by his wife.  He states that for about 5 years now he has had some problems with his lower back and has been under the care of Dr. Singh and pain management.  He has been found to have diffuse idiopathic skeletal hyperostosis (DISH).  Does not recall any distinct injury to his lower back.  It tends to bother him more when he stands in 1 place or bends forward.  He is better with sitting.  In the past he had done some physical therapy but did not feel like it was ultimately very helpful and therefore he does not do any regular exercises.  Had an injection in his lower lumbar spine about a month ago which she states worked for about a day or 2 and then resolved.  He may go back for further injections.  Takes a muscle relaxant at night and occasionally uses Norco.  Has not really tried Tylenol regularly and does not take any anti-inflammatory medications.    Denies any peripheral joint symptoms other than some pain on the lateral aspect of his right fifth MTP.  No swelling redness or warmth.       Past Medical,Family, and Social History reviewed.  Patient's PMH/PSH,SH,PSYCH Hx, MEDs, ALLERGIES, and ROS were all reviewed and updated in the appropriate sections.    PAST MEDICAL HISTORY:  Past Medical History:   Diagnosis Date    Anemia     Anxiety, generalized     BPH (benign prostatic hyperplasia)     CRF (chronic renal failure)     stage 3 -   5/24/17 patient denies    Depression     Has a tremor     HIGH CHOLESTEROL     Hypothyroid     IBD (inflammatory bowel disease)     Insomnia

## 2025-02-14 RX ORDER — PRIMIDONE 250 MG/1
TABLET ORAL
Qty: 30 TABLET | Refills: 0 | Status: SHIPPED | OUTPATIENT
Start: 2025-02-14 | End: 2025-03-17

## 2025-02-14 NOTE — TELEPHONE ENCOUNTER
Requesting medication refill. Please approve or deny this request.    Rx requested:  Requested Prescriptions     Pending Prescriptions Disp Refills    primidone (MYSOLINE) 250 MG tablet [Pharmacy Med Name: Primidone 250 MG Oral Tablet] 30 tablet 0     Sig: Take 1 tablet by mouth nightly         Last Office Visit:   8/29/2024      Next Visit Date:  Future Appointments   Date Time Provider Department Center   2/27/2025  3:00 PM David Pang MD [place] [facility] Neurology -   4/9/2025  2:45 PM Madalyn Bella DO [facility] DEP               Last refill 1/7/25. Please approve or deny.

## 2025-02-18 RX ORDER — PRIMIDONE 250 MG/1
TABLET ORAL
Qty: 30 TABLET | Refills: 0 | OUTPATIENT
Start: 2025-02-18

## 2025-02-27 ENCOUNTER — OFFICE VISIT (OUTPATIENT)
Dept: NEUROLOGY | Age: 75
End: 2025-02-27
Payer: MEDICARE

## 2025-02-27 VITALS
BODY MASS INDEX: 28.23 KG/M2 | DIASTOLIC BLOOD PRESSURE: 72 MMHG | HEART RATE: 82 BPM | WEIGHT: 195 LBS | SYSTOLIC BLOOD PRESSURE: 124 MMHG | OXYGEN SATURATION: 96 %

## 2025-02-27 DIAGNOSIS — F34.1 PERSISTENT DEPRESSIVE DISORDER: ICD-10-CM

## 2025-02-27 DIAGNOSIS — G25.0 ESSENTIAL TREMOR: Primary | ICD-10-CM

## 2025-02-27 DIAGNOSIS — M54.16 LUMBAR RADICULOPATHY: ICD-10-CM

## 2025-02-27 DIAGNOSIS — M48.062 SPINAL STENOSIS OF LUMBAR REGION WITH NEUROGENIC CLAUDICATION: ICD-10-CM

## 2025-02-27 DIAGNOSIS — M96.1 FAILED BACK SYNDROME OF LUMBAR SPINE: ICD-10-CM

## 2025-02-27 PROCEDURE — 1036F TOBACCO NON-USER: CPT | Performed by: PSYCHIATRY & NEUROLOGY

## 2025-02-27 PROCEDURE — 99214 OFFICE O/P EST MOD 30 MIN: CPT | Performed by: PSYCHIATRY & NEUROLOGY

## 2025-02-27 PROCEDURE — 1159F MED LIST DOCD IN RCRD: CPT | Performed by: PSYCHIATRY & NEUROLOGY

## 2025-02-27 PROCEDURE — G8417 CALC BMI ABV UP PARAM F/U: HCPCS | Performed by: PSYCHIATRY & NEUROLOGY

## 2025-02-27 PROCEDURE — G8427 DOCREV CUR MEDS BY ELIG CLIN: HCPCS | Performed by: PSYCHIATRY & NEUROLOGY

## 2025-02-27 PROCEDURE — 1123F ACP DISCUSS/DSCN MKR DOCD: CPT | Performed by: PSYCHIATRY & NEUROLOGY

## 2025-02-27 NOTE — PROGRESS NOTES
issues appears to be neck pain and he was hyperreflexic therefore we obtained an MRI of the cervical spine which shows central canal stenosis which is moderate at C3 and 4 and has no myelopathic signs and therefore does not require surgical intervention.  Next we had further refer the patient to rheumatology where has been seen    His main issues appears to be back pain and this is causing quality-of-life issues.  I do not see that he has had recent evaluation of the same and therefore recommend MRI of the lumbosacral spine.  We will see what we find and then further reevaluate him    David Pang MD, KENDALL  Diplomate, American Board of Psychiatry & Neurology  Board Certified in Vascular Neurology  Board Certified in Neuromuscular Medicine  Certified in Neurorehabilitation         Plan:      Orders Placed This Encounter   Procedures    MRI LUMBAR SPINE WO CONTRAST     Standing Status:   Future     Standing Expiration Date:   2/27/2026     Order Specific Question:   Reason for exam:     Answer:   Lumbar canal stenosis     Order Specific Question:   What is the sedation requirement?     Answer:   None     No orders of the defined types were placed in this encounter.      Return in about 6 months (around 8/27/2025).      David Pang MD

## 2025-03-11 ENCOUNTER — HOSPITAL ENCOUNTER (OUTPATIENT)
Dept: MRI IMAGING | Age: 75
Discharge: HOME | End: 2025-03-13
Attending: PSYCHIATRY & NEUROLOGY
Payer: MEDICARE

## 2025-03-11 DIAGNOSIS — M48.062 SPINAL STENOSIS OF LUMBAR REGION WITH NEUROGENIC CLAUDICATION: ICD-10-CM

## 2025-03-11 PROCEDURE — 72148 MRI LUMBAR SPINE W/O DYE: CPT

## 2025-03-17 RX ORDER — PRIMIDONE 250 MG/1
250 TABLET ORAL NIGHTLY
Qty: 30 TABLET | Refills: 3 | Status: SHIPPED | OUTPATIENT
Start: 2025-03-17

## 2025-03-17 NOTE — TELEPHONE ENCOUNTER
Pharmacy is  requesting medication refill. Please approve or deny this request.    Rx requested:  Requested Prescriptions     Pending Prescriptions Disp Refills    primidone (MYSOLINE) 250 MG tablet [Pharmacy Med Name: Primidone 250 MG Oral Tablet] 30 tablet 3     Sig: Take 1 tablet by mouth nightly         Last Office Visit:   2/27/2025      Next Visit Date:  Future Appointments   Date Time Provider Department Center   4/9/2025  2:45 PM Madalyn Bella DO [facility]   8/28/2025  2:15 PM David Pang MD [place] [facility] Neurology -

## 2025-03-18 NOTE — TELEPHONE ENCOUNTER
"Occupational Therapy   Initial Evaluation     Patient Name: Faith Salomon  Age:  48 y.o., Sex:  female  Medical Record #: [id number]  Today's Date: 3/18/2025       Precautions: Fall Risk, Lumbosacral Orthosis, Spinal / Back Precautions   Comments: LSO on when OOB > 5 min    Assessment    Patient is 48 y.o. female s/p L5-S1 decompression/fusion (anterior and posterior approaches). Pt seen for OT eval and treatment. See grid below for details. Treatment included education on: neutral spine, lifting restriction, safe body mechanics, LSO management, home safety/fall reduction, compensatory ADL. Pt is completing BADL and transfers with no more than SBA in this setting. Has good support from spouse on DC. No further acute OT needs at this time.      Plan    Occupational Therapy Initial Treatment Plan   Duration: Evaluation only    DC Equipment Recommendations: Tub / Shower Seat  Discharge Recommendations: Anticipate that the patient will have no further occupational therapy needs after discharge from the hospital     Subjective    \"This has been going on most of my life. I have scoliosis.\"     Objective       03/18/25 0904   Prior Living Situation   Prior Services None;Home-Independent   Housing / Facility 1 Story House   Steps Into Home 2   Steps In Home 0   Rail None   Bathroom Set up Walk In Shower;Grab Bars   Equipment Owned Reacher;4-Wheel Walker;Hand Held Shower;Grab Bar(s) In Tub / Shower;Adjustable Bed Without Rails   Lives with - Patient's Self Care Capacity Spouse   Comments Spouse works, but can assist as needed with I-ADL   Prior Level of ADL Function   Self Feeding Independent   Grooming / Hygiene Independent   Bathing Independent   Dressing Independent   Toileting Independent   Prior Level of IADL Function   Laundry Independent   Home Management Independent   Shopping Independent   Prior Level Of Mobility Independent Without Device in Community;Independent Without Device in Home   Driving / Transportation " Pt's wife called in stating they will be going on a cruise for vacation and she has concerns if her  should take his medications as prescribed and if he will be ok to fly on a plane because of his blood pressure and other medical problems. Pt's wife she just wants to make sure they are all set to go June 9th. Please advise. 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[place])   Precautions   Precautions Spinal / Back Precautions ;Lumbosacral Orthosis   Comments don LSO EOB   Vitals   O2 (LPM) 0   O2 Delivery Device None - Room Air   Pain   Pain Scales 0 to 10 Scale    Pain 0 - 10 Group   Location Back;Groin   Therapist Pain Assessment Nurse Notified;During Activity  (minimal c/o, not rated; agreeable to activity)   Non Verbal Descriptors   Non Verbal Scale  Calm;Unlabored Breathing   Cognition    Cognition / Consciousness WDL   Level of Consciousness Alert   Comments pleasant & cooperative, receptive to education   Active ROM Upper Body   Active ROM Upper Body  WDL   Strength Upper Body   Upper Body Strength  WDL   Coordination Upper Body   Coordination WDL   Balance Assessment   Sitting Balance (Static) Good   Sitting Balance (Dynamic) Fair +   Standing Balance (Static) Fair   Standing Balance (Dynamic) Fair -   Weight Shift Sitting Good   Weight Shift Standing Fair   Comments FWW for standing; one small LOB requiring min A to recover   Bed Mobility    Supine to Sit Supervised   Sit to Supine Supervised   Scooting Supervised   Rolling Supervised   Comments trained on log roll technique; pt used bed rails; educated on use of furniture next to bed for leverage (pt also has adjustable bed and foam wedge)   ADL Assessment   Grooming Standby Assist;Standing  (oral care at sink)   Lower Body Dressing   (simulated (pt already dressed); instructed on compensatory approache, pt able to tailor sit bilaterally)   Toileting   (declined need, completed toilet transfer and simulated hygiene)   Functional Mobility   Sit to Stand Supervised   Bed, Chair, Wheelchair Transfer Supervised   Toilet Transfers Standby Assist   Transfer Method Stand Step  (FWW)   Mobility bed mobility, short gait and transfers with FWW   Distance (Feet) 15   # of Times Distance was  Traveled 2   Visual Perception   Visual Perception  Not Tested   Edema / Skin Assessment   Edema / Skin  Not Assessed   Activity Tolerance   Sitting in Chair >10 min; up post   Sitting Edge of Bed EOB start of session   Standing 10 min   Comments functional tolerance   Education Group   Education Provided Spinal Precautions;Role of Occupational Therapist;Home Safety;Activities of Daily Living;Adaptive Equipment   Role of Occupational Therapist Patient Response Patient;Acceptance;Explanation;Verbal Demonstration   Spinal Precautions Patient Response Patient;Acceptance;Explanation;Demonstration;Handout;Verbal Demonstration;Action Demonstration  (neutral spine, lifting restriction, safe body mechanics)   Brace Wear & Care Patient Response Patient;Acceptance;Explanation;Demonstration;Handout;Verbal Demonstration;Action Demonstration  (LSO management)   Home Safety Patient Response Patient;Acceptance;Explanation;Verbal Demonstration  (use of shower chair with supv; fall reduction strategies with night toileting)   ADL Patient Response Patient;Acceptance;Explanation;Demonstration;Verbal Demonstration;Action Demonstration  (compensatory approaches within spine prec)   Adaptive Equipment Patient Response Patient;Acceptance;Explanation;Demonstration;Handout;Verbal Demonstration  (use of reacher for object retrieval)   Occupational Therapy Initial Treatment Plan    Duration Evaluation only   Interdisciplinary Plan of Care Collaboration   IDT Collaboration with  Nursing   Patient Position at End of Therapy Seated;Call Light within Reach;Tray Table within Reach;Phone within Reach   Collaboration Comments OT results and recs   Session Information   Date / Session Number  3/18 #1

## 2025-03-26 DIAGNOSIS — E03.4 HYPOTHYROIDISM DUE TO ACQUIRED ATROPHY OF THYROID: ICD-10-CM

## 2025-03-26 DIAGNOSIS — N18.30 STAGE 3 CHRONIC KIDNEY DISEASE, UNSPECIFIED WHETHER STAGE 3A OR 3B CKD (HCC): ICD-10-CM

## 2025-03-26 DIAGNOSIS — E78.00 HYPERCHOLESTEROLEMIA: ICD-10-CM

## 2025-03-26 LAB
ALBUMIN SERPL-MCNC: 4.1 G/DL (ref 3.5–4.6)
ALP SERPL-CCNC: 103 U/L (ref 35–104)
ALT SERPL-CCNC: 20 U/L (ref 0–41)
ANION GAP SERPL CALCULATED.3IONS-SCNC: 9 MEQ/L (ref 9–15)
AST SERPL-CCNC: 26 U/L (ref 0–40)
BASOPHILS # BLD: 0 K/UL (ref 0–0.2)
BASOPHILS NFR BLD: 0.6 %
BILIRUB SERPL-MCNC: 0.3 MG/DL (ref 0.2–0.7)
BUN SERPL-MCNC: 10 MG/DL (ref 8–23)
CALCIUM SERPL-MCNC: 9 MG/DL (ref 8.5–9.9)
CHLORIDE SERPL-SCNC: 106 MEQ/L (ref 95–107)
CO2 SERPL-SCNC: 28 MEQ/L (ref 20–31)
CREAT SERPL-MCNC: 1.25 MG/DL (ref 0.7–1.2)
EOSINOPHIL # BLD: 0.2 K/UL (ref 0–0.7)
EOSINOPHIL NFR BLD: 2.2 %
ERYTHROCYTE [DISTWIDTH] IN BLOOD BY AUTOMATED COUNT: 12.3 % (ref 11.5–14.5)
GLOBULIN SER CALC-MCNC: 2.4 G/DL (ref 2.3–3.5)
GLUCOSE FASTING: 82 MG/DL (ref 70–99)
HCT VFR BLD AUTO: 44.2 % (ref 42–52)
HGB BLD-MCNC: 15.1 G/DL (ref 14–18)
LYMPHOCYTES # BLD: 0.9 K/UL (ref 1–4.8)
LYMPHOCYTES NFR BLD: 13.3 %
MCH RBC QN AUTO: 33.2 PG (ref 27–31.3)
MCHC RBC AUTO-ENTMCNC: 34.2 % (ref 33–37)
MCV RBC AUTO: 97.1 FL (ref 79–92.2)
MONOCYTES # BLD: 0.5 K/UL (ref 0.2–0.8)
MONOCYTES NFR BLD: 6.7 %
NEUTROPHILS # BLD: 5.4 K/UL (ref 1.4–6.5)
NEUTS SEG NFR BLD: 76.8 %
PLATELET # BLD AUTO: 236 K/UL (ref 130–400)
POTASSIUM SERPL-SCNC: 4.5 MEQ/L (ref 3.4–4.9)
PROT SERPL-MCNC: 6.5 G/DL (ref 6.3–8)
RBC # BLD AUTO: 4.55 M/UL (ref 4.7–6.1)
SODIUM SERPL-SCNC: 143 MEQ/L (ref 135–144)
TSH REFLEX: 1.86 UIU/ML (ref 0.44–3.86)
WBC # BLD AUTO: 7 K/UL (ref 4.8–10.8)

## 2025-03-27 LAB
CHOLEST SERPL-MCNC: 176 MG/DL (ref 0–199)
HDLC SERPL-MCNC: 37 MG/DL (ref 40–59)
LDL CHOLESTEROL: 110 MG/DL (ref 0–129)
TRIGLYCERIDE, FASTING: 147 MG/DL (ref 0–150)

## 2025-03-28 ENCOUNTER — RESULTS FOLLOW-UP (OUTPATIENT)
Age: 75
End: 2025-03-28

## 2025-04-06 SDOH — ECONOMIC STABILITY: INCOME INSECURITY: IN THE LAST 12 MONTHS, WAS THERE A TIME WHEN YOU WERE NOT ABLE TO PAY THE MORTGAGE OR RENT ON TIME?: NO

## 2025-04-06 SDOH — ECONOMIC STABILITY: FOOD INSECURITY: WITHIN THE PAST 12 MONTHS, THE FOOD YOU BOUGHT JUST DIDN'T LAST AND YOU DIDN'T HAVE MONEY TO GET MORE.: NEVER TRUE

## 2025-04-06 SDOH — ECONOMIC STABILITY: FOOD INSECURITY: WITHIN THE PAST 12 MONTHS, YOU WORRIED THAT YOUR FOOD WOULD RUN OUT BEFORE YOU GOT MONEY TO BUY MORE.: NEVER TRUE

## 2025-04-06 ASSESSMENT — PATIENT HEALTH QUESTIONNAIRE - PHQ9
5. POOR APPETITE OR OVEREATING: NEARLY EVERY DAY
9. THOUGHTS THAT YOU WOULD BE BETTER OFF DEAD, OR OF HURTING YOURSELF: NOT AT ALL
7. TROUBLE CONCENTRATING ON THINGS, SUCH AS READING THE NEWSPAPER OR WATCHING TELEVISION: NOT AT ALL
10. IF YOU CHECKED OFF ANY PROBLEMS, HOW DIFFICULT HAVE THESE PROBLEMS MADE IT FOR YOU TO DO YOUR WORK, TAKE CARE OF THINGS AT HOME, OR GET ALONG WITH OTHER PEOPLE: SOMEWHAT DIFFICULT
9. THOUGHTS THAT YOU WOULD BE BETTER OFF DEAD, OR OF HURTING YOURSELF: NOT AT ALL
SUM OF ALL RESPONSES TO PHQ QUESTIONS 1-9: 17
8. MOVING OR SPEAKING SO SLOWLY THAT OTHER PEOPLE COULD HAVE NOTICED. OR THE OPPOSITE, BEING SO FIGETY OR RESTLESS THAT YOU HAVE BEEN MOVING AROUND A LOT MORE THAN USUAL: NOT AT ALL
2. FEELING DOWN, DEPRESSED OR HOPELESS: NEARLY EVERY DAY
SUM OF ALL RESPONSES TO PHQ QUESTIONS 1-9: 17
6. FEELING BAD ABOUT YOURSELF - OR THAT YOU ARE A FAILURE OR HAVE LET YOURSELF OR YOUR FAMILY DOWN: NEARLY EVERY DAY
SUM OF ALL RESPONSES TO PHQ QUESTIONS 1-9: 17
3. TROUBLE FALLING OR STAYING ASLEEP: MORE THAN HALF THE DAYS
SUM OF ALL RESPONSES TO PHQ QUESTIONS 1-9: 17
1. LITTLE INTEREST OR PLEASURE IN DOING THINGS: NEARLY EVERY DAY
4. FEELING TIRED OR HAVING LITTLE ENERGY: NEARLY EVERY DAY
6. FEELING BAD ABOUT YOURSELF - OR THAT YOU ARE A FAILURE OR HAVE LET YOURSELF OR YOUR FAMILY DOWN: NEARLY EVERY DAY
7. TROUBLE CONCENTRATING ON THINGS, SUCH AS READING THE NEWSPAPER OR WATCHING TELEVISION: NOT AT ALL
5. POOR APPETITE OR OVEREATING: NEARLY EVERY DAY
8. MOVING OR SPEAKING SO SLOWLY THAT OTHER PEOPLE COULD HAVE NOTICED. OR THE OPPOSITE - BEING SO FIDGETY OR RESTLESS THAT YOU HAVE BEEN MOVING AROUND A LOT MORE THAN USUAL: NOT AT ALL
2. FEELING DOWN, DEPRESSED OR HOPELESS: NEARLY EVERY DAY
10. IF YOU CHECKED OFF ANY PROBLEMS, HOW DIFFICULT HAVE THESE PROBLEMS MADE IT FOR YOU TO DO YOUR WORK, TAKE CARE OF THINGS AT HOME, OR GET ALONG WITH OTHER PEOPLE: SOMEWHAT DIFFICULT
SUM OF ALL RESPONSES TO PHQ QUESTIONS 1-9: 17
4. FEELING TIRED OR HAVING LITTLE ENERGY: NEARLY EVERY DAY
1. LITTLE INTEREST OR PLEASURE IN DOING THINGS: NEARLY EVERY DAY
3. TROUBLE FALLING OR STAYING ASLEEP: MORE THAN HALF THE DAYS

## 2025-04-09 ENCOUNTER — OFFICE VISIT (OUTPATIENT)
Age: 75
End: 2025-04-09
Payer: MEDICARE

## 2025-04-09 VITALS
HEIGHT: 70 IN | DIASTOLIC BLOOD PRESSURE: 62 MMHG | HEART RATE: 69 BPM | BODY MASS INDEX: 27.77 KG/M2 | SYSTOLIC BLOOD PRESSURE: 98 MMHG | TEMPERATURE: 97.1 F | WEIGHT: 194 LBS | OXYGEN SATURATION: 96 %

## 2025-04-09 DIAGNOSIS — F34.1 PERSISTENT DEPRESSIVE DISORDER: ICD-10-CM

## 2025-04-09 DIAGNOSIS — E03.4 HYPOTHYROIDISM DUE TO ACQUIRED ATROPHY OF THYROID: Primary | ICD-10-CM

## 2025-04-09 DIAGNOSIS — G95.9 CERVICAL MYELOPATHY (HCC): ICD-10-CM

## 2025-04-09 DIAGNOSIS — N18.30 STAGE 3 CHRONIC KIDNEY DISEASE, UNSPECIFIED WHETHER STAGE 3A OR 3B CKD (HCC): ICD-10-CM

## 2025-04-09 DIAGNOSIS — F41.1 ANXIETY, GENERALIZED: ICD-10-CM

## 2025-04-09 DIAGNOSIS — E78.00 HYPERCHOLESTEROLEMIA: ICD-10-CM

## 2025-04-09 PROCEDURE — 1160F RVW MEDS BY RX/DR IN RCRD: CPT | Performed by: STUDENT IN AN ORGANIZED HEALTH CARE EDUCATION/TRAINING PROGRAM

## 2025-04-09 PROCEDURE — 1123F ACP DISCUSS/DSCN MKR DOCD: CPT | Performed by: STUDENT IN AN ORGANIZED HEALTH CARE EDUCATION/TRAINING PROGRAM

## 2025-04-09 PROCEDURE — G8417 CALC BMI ABV UP PARAM F/U: HCPCS | Performed by: STUDENT IN AN ORGANIZED HEALTH CARE EDUCATION/TRAINING PROGRAM

## 2025-04-09 PROCEDURE — 1159F MED LIST DOCD IN RCRD: CPT | Performed by: STUDENT IN AN ORGANIZED HEALTH CARE EDUCATION/TRAINING PROGRAM

## 2025-04-09 PROCEDURE — G2211 COMPLEX E/M VISIT ADD ON: HCPCS | Performed by: STUDENT IN AN ORGANIZED HEALTH CARE EDUCATION/TRAINING PROGRAM

## 2025-04-09 PROCEDURE — 1036F TOBACCO NON-USER: CPT | Performed by: STUDENT IN AN ORGANIZED HEALTH CARE EDUCATION/TRAINING PROGRAM

## 2025-04-09 PROCEDURE — G8427 DOCREV CUR MEDS BY ELIG CLIN: HCPCS | Performed by: STUDENT IN AN ORGANIZED HEALTH CARE EDUCATION/TRAINING PROGRAM

## 2025-04-09 PROCEDURE — 99213 OFFICE O/P EST LOW 20 MIN: CPT | Performed by: STUDENT IN AN ORGANIZED HEALTH CARE EDUCATION/TRAINING PROGRAM

## 2025-04-09 PROCEDURE — 99214 OFFICE O/P EST MOD 30 MIN: CPT | Performed by: STUDENT IN AN ORGANIZED HEALTH CARE EDUCATION/TRAINING PROGRAM

## 2025-04-09 NOTE — PROGRESS NOTES
Subjective  Higinio Smith, 74 y.o. male presents today with:  Chief Complaint   Patient presents with    6 Month Follow-Up    Depression     Taking Primidone as directed. Following with Dr. Pang.     Anxiety     Taking Buspar as directed. Following with Dr. Pang.       Insomnia     Taking Primidone as directed. Following with Dr. Pang.       Chronic Kidney Disease     Labs done 3/26/25.     Hypothyroidism     Labs done 3/26/25.     Hyperlipidemia     Lipid panel done 3/26/25.     Results     Would like to review recent test results.        History of Present Illness  The patient is a 74-year-old male who presents for follow up.     Severe pain in the lumbar region is reported, attributed to degenerative disc disease from T5 to T11. He describes a sensation of his legs giving way during ambulation, accompanied by numbness in his toes, and notes a deformity in his foot. A previous referral to a podiatrist was found unhelpful. Referral to a rheumatologist by Dr. Pang resulted in a diagnosis of DISH without any tests conducted, which the patient doubts. He requests a new medical letter for work, citing degenerative disc disease in both the spine and lumbar regions. Difficulty in rising from a seated position and lightheadedness upon standing are reported. He is unable to perform tasks such as cutting his toenails due to the inability to straighten his back after bending, experiencing severe pain often resulting in tears. Pain at the T5 and T11 levels is believed to be due to muscle spasms, exacerbated by prolonged sitting or standing.     Currently, he is taking Norco 7.5 mg for pain management but reports it is not always effective. Treatment with Dr. Burrell has been discontinued due to lack of efficacy and cost concerns. He is not seeking any medication refills at this time. Radiofrequency therapy was tried but did not work the last time.    Medications include atorvastatin 40 mg and levothyroxine 88 mcg.

## 2025-05-01 RX ORDER — BUSPIRONE HYDROCHLORIDE 10 MG/1
10 TABLET ORAL 3 TIMES DAILY
Qty: 270 TABLET | Refills: 0 | Status: SHIPPED | OUTPATIENT
Start: 2025-05-01

## 2025-05-01 NOTE — TELEPHONE ENCOUNTER
Requesting medication refill. Please approve or deny this request.    Rx requested:  Requested Prescriptions     Pending Prescriptions Disp Refills    busPIRone (BUSPAR) 10 MG tablet [Pharmacy Med Name: busPIRone HCl Oral Tablet 10 MG] 270 tablet 0     Sig: TAKE 1 TABLET BY MOUTH 3 TIMES A DAY         Last Office Visit:   2/27/2025      Next Visit Date:  Future Appointments   Date Time Provider Department Center   8/28/2025  2:15 PM David Pang MD [facility] -   10/13/2025  2:30 PM Madalyn Bella DO [place] [facility]               Last refill 2/3/25. Please approve or deny.

## 2025-05-15 ENCOUNTER — OFFICE VISIT (OUTPATIENT)
Age: 75
End: 2025-05-15
Payer: MEDICARE

## 2025-05-15 VITALS
WEIGHT: 194 LBS | DIASTOLIC BLOOD PRESSURE: 80 MMHG | HEART RATE: 88 BPM | TEMPERATURE: 97.6 F | BODY MASS INDEX: 27.77 KG/M2 | SYSTOLIC BLOOD PRESSURE: 118 MMHG | OXYGEN SATURATION: 98 % | HEIGHT: 70 IN

## 2025-05-15 DIAGNOSIS — H10.32 ACUTE BACTERIAL CONJUNCTIVITIS OF LEFT EYE: ICD-10-CM

## 2025-05-15 DIAGNOSIS — H04.302: Primary | ICD-10-CM

## 2025-05-15 PROCEDURE — 1159F MED LIST DOCD IN RCRD: CPT | Performed by: NURSE PRACTITIONER

## 2025-05-15 PROCEDURE — 1123F ACP DISCUSS/DSCN MKR DOCD: CPT | Performed by: NURSE PRACTITIONER

## 2025-05-15 PROCEDURE — G8427 DOCREV CUR MEDS BY ELIG CLIN: HCPCS | Performed by: NURSE PRACTITIONER

## 2025-05-15 PROCEDURE — 99213 OFFICE O/P EST LOW 20 MIN: CPT | Performed by: NURSE PRACTITIONER

## 2025-05-15 PROCEDURE — 1036F TOBACCO NON-USER: CPT | Performed by: NURSE PRACTITIONER

## 2025-05-15 PROCEDURE — 99212 OFFICE O/P EST SF 10 MIN: CPT | Performed by: NURSE PRACTITIONER

## 2025-05-15 PROCEDURE — G8417 CALC BMI ABV UP PARAM F/U: HCPCS | Performed by: NURSE PRACTITIONER

## 2025-05-15 RX ORDER — NEOMYCIN SULFATE, POLYMYXIN B SULFATE, HYDROCORTISONE 3.5; 10000; 1 MG/ML; [USP'U]/ML; MG/ML
4 SOLUTION/ DROPS AURICULAR (OTIC) 3 TIMES DAILY
Qty: 10 ML | Refills: 0 | Status: SHIPPED | OUTPATIENT
Start: 2025-05-15 | End: 2025-05-22

## 2025-05-15 NOTE — PROGRESS NOTES
Subjective:      Patient ID: Higinio Smith is a 74 y.o. male who presents today for:  Chief Complaint   Patient presents with    Eye Problem     Redness of left eye.        HPI  Patient is here with c/o left eye redness and swelling.   He is having a lot of watering.   Says he has had issues with eyes in the past but has been about a year.  He would like to see a specialist.     Past Medical History:   Diagnosis Date    Anemia     Anxiety, generalized     BPH (benign prostatic hyperplasia)     CRF (chronic renal failure)     stage 3 -   17 patient denies    Depression     Has a tremor     HIGH CHOLESTEROL     Hypothyroid     IBD (inflammatory bowel disease)     Insomnia     Kidney stones     Seasonal allergic conjunctivitis     Tinnitus     chronic      Past Surgical History:   Procedure Laterality Date    CATARACT REMOVAL Bilateral     COLONOSCOPY      KNEE SURGERY      Bilat- knee,legs    [place] COLON CA SCRN NOT HI RSK IND N/A 2017    COLONOSCOPY performed by Ray Mendosa MD at [facility]    SHOULDER SURGERY      L shoulder repair    THYROID SURGERY       Social History     Socioeconomic History    Marital status:      Spouse name: Teri    Number of children: Not on file    Years of education: Not on file    Highest education level: Not on file   Occupational History    Not on file   Tobacco Use    Smoking status: Former     Current packs/day: 0.00     Average packs/day: 0.5 packs/day for 30.0 years (15.0 ttl pk-yrs)     Types: Pipe, Cigarettes     Start date: 1972     Quit date: 2002     Years since quittin.3    Smokeless tobacco: Never   Vaping Use    Vaping status: Never Used   Substance and Sexual Activity    Alcohol use: Not Currently    Drug use: No    Sexual activity: Yes     Partners: Female   Other Topics Concern    Not on file   Social History Narrative    Not on file     Social Drivers of Health     Financial Resource Strain: Low Risk

## 2025-05-16 ENCOUNTER — TELEPHONE (OUTPATIENT)
Age: 75
End: 2025-05-16

## 2025-05-16 RX ORDER — POLYMYXIN B SULFATE AND TRIMETHOPRIM 1; 10000 MG/ML; [USP'U]/ML
1 SOLUTION OPHTHALMIC EVERY 4 HOURS
Qty: 3 ML | Refills: 0 | Status: SHIPPED | OUTPATIENT
Start: 2025-05-16 | End: 2025-05-26

## 2025-05-16 ASSESSMENT — ENCOUNTER SYMPTOMS
SINUS PRESSURE: 0
EYE DISCHARGE: 1
EYE ITCHING: 0
RHINORRHEA: 0
PHOTOPHOBIA: 0
SINUS PAIN: 0
TROUBLE SWALLOWING: 0
EYE REDNESS: 1
EYE PAIN: 1
SORE THROAT: 0
VOICE CHANGE: 0

## 2025-05-16 NOTE — TELEPHONE ENCOUNTER
Patient was seen at the walk-in yesterday for redness of the left eye and when went to pick the script up, [facility] informed him the directions state to place drops into left ear.    He is inquiring if a new script can be sent in.

## 2025-06-03 DIAGNOSIS — M48.062 SPINAL STENOSIS OF LUMBAR REGION WITH NEUROGENIC CLAUDICATION: ICD-10-CM

## 2025-06-03 NOTE — TELEPHONE ENCOUNTER
Requesting medication refill. Please approve or deny this request.    Rx requested:  Requested Prescriptions     Pending Prescriptions Disp Refills    HYDROcodone-acetaminophen (NORCO) 5-325 MG per tablet 30 tablet 0     Sig: Take 1 tablet by mouth daily as needed for Pain for up to 30 days. Intended supply: 3 days. Take lowest dose possible to manage pain Max Daily Amount: 1 tablet         Last Office Visit:   2/27/2025      Next Visit Date:  Future Appointments   Date Time Provider Department Center   8/28/2025  2:15 PM David Pang MD [facility] -   10/13/2025  2:30 PM Madalyn Bella, DO [place] [facility]               Last refill 11/20/24. Please approve or deny.

## 2025-06-03 NOTE — TELEPHONE ENCOUNTER
Patient states he no longer sees the provider that prescribed this medication and that Dr. Bella stated she would take over the prescription. Patient is almost out.    [place] 04/09/2025  [place] 10/13/2025

## 2025-06-04 RX ORDER — HYDROCODONE BITARTRATE AND ACETAMINOPHEN 5; 325 MG/1; MG/1
1 TABLET ORAL DAILY PRN
Qty: 30 TABLET | Refills: 0 | Status: SHIPPED | OUTPATIENT
Start: 2025-06-04 | End: 2025-07-04

## 2025-06-25 DIAGNOSIS — E03.4 HYPOTHYROIDISM DUE TO ACQUIRED ATROPHY OF THYROID: ICD-10-CM

## 2025-06-26 RX ORDER — LEVOTHYROXINE SODIUM 88 UG/1
88 TABLET ORAL DAILY
Qty: 90 TABLET | Refills: 3 | Status: SHIPPED | OUTPATIENT
Start: 2025-06-26

## 2025-06-26 NOTE — TELEPHONE ENCOUNTER
Patient is  requesting medication refill. Please approve or deny this request.    Rx requested:  Requested Prescriptions     Pending Prescriptions Disp Refills    primidone (MYSOLINE) 250 MG tablet 30 tablet 0     Sig: Take 1 tablet by mouth nightly         Last Office Visit:   2/27/2025      Next Visit Date:  Future Appointments   Date Time Provider Department Center   8/28/2025  2:15 PM David Pang MD [facility] -   10/13/2025  2:30 PM Madalyn Bella DO [facility] DEP

## 2025-07-02 RX ORDER — PRIMIDONE 250 MG/1
250 TABLET ORAL NIGHTLY
Qty: 30 TABLET | Refills: 0 | Status: SHIPPED | OUTPATIENT
Start: 2025-07-02

## 2025-08-08 RX ORDER — BUSPIRONE HYDROCHLORIDE 10 MG/1
10 TABLET ORAL 3 TIMES DAILY
Qty: 270 TABLET | Refills: 0 | Status: SHIPPED | OUTPATIENT
Start: 2025-08-08

## 2025-08-28 ENCOUNTER — OFFICE VISIT (OUTPATIENT)
Age: 75
End: 2025-08-28
Payer: MEDICARE

## 2025-08-28 VITALS
BODY MASS INDEX: 27.8 KG/M2 | SYSTOLIC BLOOD PRESSURE: 120 MMHG | DIASTOLIC BLOOD PRESSURE: 70 MMHG | WEIGHT: 192 LBS | HEART RATE: 73 BPM

## 2025-08-28 DIAGNOSIS — M54.16 LUMBAR RADICULOPATHY: ICD-10-CM

## 2025-08-28 DIAGNOSIS — R25.1 TREMORS OF NERVOUS SYSTEM: ICD-10-CM

## 2025-08-28 DIAGNOSIS — M96.1 FAILED BACK SYNDROME OF LUMBAR SPINE: ICD-10-CM

## 2025-08-28 DIAGNOSIS — G25.0 ESSENTIAL TREMOR: Primary | ICD-10-CM

## 2025-08-28 DIAGNOSIS — F41.1 ANXIETY, GENERALIZED: ICD-10-CM

## 2025-08-28 PROCEDURE — G8427 DOCREV CUR MEDS BY ELIG CLIN: HCPCS | Performed by: PSYCHIATRY & NEUROLOGY

## 2025-08-28 PROCEDURE — 99213 OFFICE O/P EST LOW 20 MIN: CPT | Performed by: PSYCHIATRY & NEUROLOGY

## 2025-08-28 PROCEDURE — 99214 OFFICE O/P EST MOD 30 MIN: CPT | Performed by: PSYCHIATRY & NEUROLOGY

## 2025-08-28 PROCEDURE — 1159F MED LIST DOCD IN RCRD: CPT | Performed by: PSYCHIATRY & NEUROLOGY

## 2025-08-28 PROCEDURE — 1123F ACP DISCUSS/DSCN MKR DOCD: CPT | Performed by: PSYCHIATRY & NEUROLOGY

## 2025-08-28 PROCEDURE — G8417 CALC BMI ABV UP PARAM F/U: HCPCS | Performed by: PSYCHIATRY & NEUROLOGY

## 2025-08-28 PROCEDURE — 1036F TOBACCO NON-USER: CPT | Performed by: PSYCHIATRY & NEUROLOGY

## 2025-08-28 ASSESSMENT — ENCOUNTER SYMPTOMS
BACK PAIN: 1
SHORTNESS OF BREATH: 0
PHOTOPHOBIA: 0
CHOKING: 0
VOMITING: 0
TROUBLE SWALLOWING: 0
NAUSEA: 0
COLOR CHANGE: 0